# Patient Record
Sex: MALE | Race: WHITE | NOT HISPANIC OR LATINO | Employment: FULL TIME | RURAL
[De-identification: names, ages, dates, MRNs, and addresses within clinical notes are randomized per-mention and may not be internally consistent; named-entity substitution may affect disease eponyms.]

---

## 2017-03-16 ENCOUNTER — TELEPHONE (OUTPATIENT)
Dept: FAMILY MEDICINE CLINIC | Facility: CLINIC | Age: 52
End: 2017-03-16

## 2017-03-16 RX ORDER — OSELTAMIVIR PHOSPHATE 75 MG/1
75 CAPSULE ORAL 2 TIMES DAILY
Qty: 10 CAPSULE | Refills: 0 | Status: SHIPPED | OUTPATIENT
Start: 2017-03-16 | End: 2017-03-21

## 2017-03-16 NOTE — TELEPHONE ENCOUNTER
Pt wife called he has body aches chills coughing congestion and temp she wants to know if uwill call in meds md2 nka [phone]

## 2017-03-31 ENCOUNTER — OFFICE VISIT (OUTPATIENT)
Dept: FAMILY MEDICINE CLINIC | Facility: CLINIC | Age: 52
End: 2017-03-31

## 2017-03-31 VITALS
BODY MASS INDEX: 28.93 KG/M2 | DIASTOLIC BLOOD PRESSURE: 100 MMHG | SYSTOLIC BLOOD PRESSURE: 148 MMHG | OXYGEN SATURATION: 97 % | HEART RATE: 92 BPM | RESPIRATION RATE: 16 BRPM | HEIGHT: 78 IN | WEIGHT: 250 LBS

## 2017-03-31 DIAGNOSIS — R05.9 COUGH: ICD-10-CM

## 2017-03-31 DIAGNOSIS — I10 ESSENTIAL HYPERTENSION: Primary | ICD-10-CM

## 2017-03-31 PROCEDURE — 99213 OFFICE O/P EST LOW 20 MIN: CPT | Performed by: FAMILY MEDICINE

## 2017-03-31 RX ORDER — MONTELUKAST SODIUM 10 MG/1
10 TABLET ORAL NIGHTLY
Qty: 30 TABLET | Refills: 2 | Status: SHIPPED | OUTPATIENT
Start: 2017-03-31 | End: 2019-02-12

## 2017-04-07 ENCOUNTER — TELEPHONE (OUTPATIENT)
Dept: FAMILY MEDICINE CLINIC | Facility: CLINIC | Age: 52
End: 2017-04-07

## 2017-04-10 ENCOUNTER — TELEPHONE (OUTPATIENT)
Dept: FAMILY MEDICINE CLINIC | Facility: CLINIC | Age: 52
End: 2017-04-10

## 2017-04-10 RX ORDER — AMOXICILLIN AND CLAVULANATE POTASSIUM 875; 125 MG/1; MG/1
1 TABLET, FILM COATED ORAL 2 TIMES DAILY
Qty: 28 TABLET | Refills: 0 | Status: SHIPPED | OUTPATIENT
Start: 2017-04-10 | End: 2017-04-24

## 2017-04-10 NOTE — TELEPHONE ENCOUNTER
No, he had that fever and it subsided, but came back last night.  He has had the congestion and runny nose for 4 weeks.

## 2017-04-10 NOTE — TELEPHONE ENCOUNTER
Done & informed morgan that if natalee was not better by Wednesday that dr. pierce wanted to see him.

## 2017-04-10 NOTE — TELEPHONE ENCOUNTER
Jennie called and siad that Ty has a fever, chest congestion and a runny nose.    She said that he has been batteling this for 4 weeks.  Do you want to see him or send something in?   [phone]

## 2017-04-12 ENCOUNTER — OFFICE VISIT (OUTPATIENT)
Dept: FAMILY MEDICINE CLINIC | Facility: CLINIC | Age: 52
End: 2017-04-12

## 2017-04-12 ENCOUNTER — TELEPHONE (OUTPATIENT)
Dept: FAMILY MEDICINE CLINIC | Facility: CLINIC | Age: 52
End: 2017-04-12

## 2017-04-12 VITALS
DIASTOLIC BLOOD PRESSURE: 88 MMHG | TEMPERATURE: 99.4 F | HEIGHT: 78 IN | SYSTOLIC BLOOD PRESSURE: 110 MMHG | RESPIRATION RATE: 16 BRPM | HEART RATE: 88 BPM | BODY MASS INDEX: 28.93 KG/M2 | OXYGEN SATURATION: 97 % | WEIGHT: 250 LBS

## 2017-04-12 DIAGNOSIS — J10.1 INFLUENZA B: Primary | ICD-10-CM

## 2017-04-12 DIAGNOSIS — R05.9 COUGHING: Primary | ICD-10-CM

## 2017-04-12 PROCEDURE — 99213 OFFICE O/P EST LOW 20 MIN: CPT | Performed by: FAMILY MEDICINE

## 2017-04-12 RX ORDER — CODEINE PHOSPHATE AND GUAIFENESIN 10; 100 MG/5ML; MG/5ML
LIQUID ORAL
COMMUNITY
Start: 2017-04-07 | End: 2017-04-12

## 2017-04-12 RX ORDER — OSELTAMIVIR PHOSPHATE 75 MG/1
75 CAPSULE ORAL 2 TIMES DAILY
Qty: 10 CAPSULE | Refills: 0 | Status: SHIPPED | OUTPATIENT
Start: 2017-04-12 | End: 2017-06-30

## 2017-04-12 NOTE — TELEPHONE ENCOUNTER
Pt wife called she siad that he woke up this am with fever again and non stop coughing with runny nose with congestion was placed on meds Monday and told if not better by today to get an appt?? [phone]

## 2017-04-12 NOTE — PROGRESS NOTES
"Subjective   Natalee Webb is a 51 y.o. male.     Chief Complaint   Patient presents with   • Cough     dry cough, fever        History of Present Illness     natalee notes having issues with cough with 102 fever , body aches..noted runny nose       Current Outpatient Prescriptions:   •  amoxicillin-clavulanate (AUGMENTIN) 875-125 MG per tablet, Take 1 tablet by mouth 2 (Two) Times a Day for 14 days., Disp: 28 tablet, Rfl: 0  •  BYSTOLIC 5 MG tablet, , Disp: , Rfl:   •  esomeprazole (NexIUM) 40 MG capsule, , Disp: , Rfl:   •  Guaifenesin-Codeine 200-10 MG/5ML liquid, Take 2 tsps every 6 hrs PRN  For cough, Disp: 150 mL, Rfl: 0  •  montelukast (SINGULAIR) 10 MG tablet, Take 1 tablet by mouth Every Night., Disp: 30 tablet, Rfl: 2  No Known Allergies    Past Medical History:   Diagnosis Date   • GERD (gastroesophageal reflux disease)    • Hypertension      Past Surgical History:   Procedure Laterality Date   • HERNIA REPAIR         Review of Systems   Constitutional: Negative.    HENT: Positive for congestion and rhinorrhea.    Eyes: Negative.    Respiratory: Positive for cough.    Cardiovascular: Negative.    Gastrointestinal: Negative.    Endocrine: Negative.    Genitourinary: Negative.    Musculoskeletal: Negative.    Skin: Negative.    Allergic/Immunologic: Negative.    Neurological: Negative.    Hematological: Negative.    Psychiatric/Behavioral: Negative.        Objective  /88  Pulse 88  Temp 99.4 °F (37.4 °C)  Resp 16  Ht 79\" (200.7 cm)  Wt 250 lb (113 kg)  SpO2 97%  BMI 28.16 kg/m2  Physical Exam   Constitutional: He is oriented to person, place, and time. He appears well-developed and well-nourished.   HENT:   Head: Normocephalic and atraumatic.   Right Ear: External ear normal.   Left Ear: External ear normal.   Nose: Nose normal.   Mouth/Throat: Oropharynx is clear and moist.   Eyes: Conjunctivae and EOM are normal. Pupils are equal, round, and reactive to light.   Neck: Normal range of motion. Neck " supple.   Cardiovascular: Normal rate, regular rhythm, normal heart sounds and intact distal pulses.    Pulmonary/Chest: Effort normal and breath sounds normal.   Abdominal: Soft. Bowel sounds are normal.   Musculoskeletal: Normal range of motion.   Neurological: He is alert and oriented to person, place, and time. He has normal reflexes.   Skin: Skin is warm and dry.   Psychiatric: He has a normal mood and affect. His behavior is normal. Judgment and thought content normal.   Nursing note and vitals reviewed.  flu swab pos for flu b    Assessment/Plan   Ty was seen today for cough.    Diagnoses and all orders for this visit:    Influenza B    Other orders  -     oseltamivir (TAMIFLU) 75 MG capsule; Take 1 capsule by mouth 2 (Two) Times a Day.  -     HYDROcod Polst-CPM Polst ER (TUSSIONEX PENNKINETIC ER) 10-8 MG/5ML ER suspension; Take 5 mL by mouth Every 12 (Twelve) Hours As Needed for Cough.        Keep me informed         No orders of the defined types were placed in this encounter.      Follow up:ismael

## 2017-06-15 RX ORDER — NEBIVOLOL HYDROCHLORIDE 5 MG/1
5 TABLET ORAL 2 TIMES DAILY
Qty: 180 TABLET | Refills: 1 | Status: SHIPPED | OUTPATIENT
Start: 2017-06-15 | End: 2017-12-19 | Stop reason: SDUPTHER

## 2017-06-30 ENCOUNTER — OFFICE VISIT (OUTPATIENT)
Dept: FAMILY MEDICINE CLINIC | Facility: CLINIC | Age: 52
End: 2017-06-30

## 2017-06-30 VITALS
WEIGHT: 249 LBS | DIASTOLIC BLOOD PRESSURE: 80 MMHG | BODY MASS INDEX: 28.81 KG/M2 | HEIGHT: 78 IN | HEART RATE: 69 BPM | RESPIRATION RATE: 18 BRPM | SYSTOLIC BLOOD PRESSURE: 122 MMHG | OXYGEN SATURATION: 99 %

## 2017-06-30 DIAGNOSIS — I10 ESSENTIAL HYPERTENSION: Primary | ICD-10-CM

## 2017-06-30 DIAGNOSIS — K21.9 GASTROESOPHAGEAL REFLUX DISEASE WITHOUT ESOPHAGITIS: ICD-10-CM

## 2017-06-30 DIAGNOSIS — R10.2 PERINEAL PAIN: ICD-10-CM

## 2017-06-30 PROCEDURE — 99213 OFFICE O/P EST LOW 20 MIN: CPT | Performed by: FAMILY MEDICINE

## 2017-06-30 RX ORDER — CIPROFLOXACIN 500 MG/1
500 TABLET, FILM COATED ORAL 2 TIMES DAILY
Qty: 20 TABLET | Refills: 0 | Status: SHIPPED | OUTPATIENT
Start: 2017-06-30 | End: 2017-08-10

## 2017-06-30 NOTE — PROGRESS NOTES
"Subjective   Natalee Webb is a 51 y.o. male.     Chief Complaint   Patient presents with   • Follow-up     3 mo        History of Present Illness     natalee notes bp has been controlled witout ha or cp...his gerd symptoms witout dysphagia  nots pain in the perineal area wituout mass or tenderness    Current Outpatient Prescriptions:   •  BYSTOLIC 5 MG tablet, Take 1 tablet by mouth 2 (Two) Times a Day., Disp: 180 tablet, Rfl: 1  •  esomeprazole (NexIUM) 40 MG capsule, , Disp: , Rfl:   •  montelukast (SINGULAIR) 10 MG tablet, Take 1 tablet by mouth Every Night., Disp: 30 tablet, Rfl: 2  No Known Allergies    Past Medical History:   Diagnosis Date   • GERD (gastroesophageal reflux disease)    • Hypertension      Past Surgical History:   Procedure Laterality Date   • HERNIA REPAIR         Review of Systems   Constitutional: Negative.    HENT: Negative.    Eyes: Negative.    Respiratory: Negative.    Cardiovascular: Negative.    Gastrointestinal: Negative.    Endocrine: Negative.    Genitourinary: Negative for decreased urine volume, difficulty urinating, discharge, dysuria, flank pain, frequency, genital sores, hematuria, penile pain, penile swelling, scrotal swelling, testicular pain and urgency.   Musculoskeletal: Negative.    Skin: Negative.    Allergic/Immunologic: Negative.    Neurological: Negative.    Hematological: Negative.    Psychiatric/Behavioral: Negative.        Objective  /80  Pulse 69  Resp 18  Ht 79\" (200.7 cm)  Wt 249 lb (113 kg)  SpO2 99%  BMI 28.05 kg/m2  Physical Exam   Constitutional: He is oriented to person, place, and time. He appears well-developed and well-nourished.   HENT:   Head: Normocephalic and atraumatic.   Right Ear: External ear normal.   Left Ear: External ear normal.   Nose: Nose normal.   Mouth/Throat: Oropharynx is clear and moist.   Eyes: Conjunctivae and EOM are normal. Pupils are equal, round, and reactive to light.   Neck: Normal range of motion. Neck supple. "   Cardiovascular: Normal rate, regular rhythm, normal heart sounds and intact distal pulses.    Pulmonary/Chest: Effort normal and breath sounds normal.   Abdominal: Soft. Bowel sounds are normal. There is tenderness (perineal pain withotu mass).   Musculoskeletal: Normal range of motion.   Neurological: He is alert and oriented to person, place, and time. He has normal reflexes.   Skin: Skin is warm and dry.   Psychiatric: He has a normal mood and affect. His behavior is normal. Judgment and thought content normal.   Nursing note and vitals reviewed.      Assessment/Plan   Ty was seen today for follow-up.    Diagnoses and all orders for this visit:    Essential hypertension  -     CBC w AUTO Differential  -     Comprehensive Metabolic Panel  -     Lipid panel    Gastroesophageal reflux disease without esophagitis    Perineal pain  -     PSA  -     Urinalysis                 Orders Placed This Encounter   Procedures   • Comprehensive Metabolic Panel   • Lipid panel   • PSA   • Urinalysis   • CBC w AUTO Differential     Order Specific Question:   Manual Differential     Answer:   No       Follow up: 4 week(s)

## 2017-07-03 LAB
ALBUMIN SERPL-MCNC: 4.4 G/DL (ref 3.5–5)
ALBUMIN/GLOB SERPL: 1.6 G/DL (ref 1.1–2.5)
ALP SERPL-CCNC: 64 U/L (ref 24–120)
ALT SERPL-CCNC: 60 U/L (ref 0–54)
APPEARANCE UR: CLEAR
AST SERPL-CCNC: 36 U/L (ref 7–45)
BASOPHILS # BLD AUTO: 0.02 10*3/MM3 (ref 0–0.2)
BASOPHILS NFR BLD AUTO: 0.3 % (ref 0–2)
BILIRUB SERPL-MCNC: 0.8 MG/DL (ref 0.1–1)
BILIRUB UR QL STRIP: NEGATIVE
BUN SERPL-MCNC: 14 MG/DL (ref 5–21)
BUN/CREAT SERPL: 11.7 (ref 7–25)
CALCIUM SERPL-MCNC: 9.3 MG/DL (ref 8.4–10.4)
CHLORIDE SERPL-SCNC: 101 MMOL/L (ref 98–110)
CHOLEST SERPL-MCNC: 209 MG/DL (ref 130–200)
CO2 SERPL-SCNC: 28 MMOL/L (ref 24–31)
COLOR UR: ABNORMAL
CREAT SERPL-MCNC: 1.2 MG/DL (ref 0.5–1.4)
EOSINOPHIL # BLD AUTO: 0.09 10*3/MM3 (ref 0–0.7)
EOSINOPHIL NFR BLD AUTO: 1.5 % (ref 0–4)
ERYTHROCYTE [DISTWIDTH] IN BLOOD BY AUTOMATED COUNT: 13.6 % (ref 12–15)
GLOBULIN SER CALC-MCNC: 2.8 GM/DL
GLUCOSE SERPL-MCNC: 119 MG/DL (ref 70–100)
GLUCOSE UR QL: NEGATIVE
HCT VFR BLD AUTO: 48.5 % (ref 40–52)
HDLC SERPL-MCNC: 25 MG/DL
HGB BLD-MCNC: 16.3 G/DL (ref 14–18)
HGB UR QL STRIP: NEGATIVE
IMM GRANULOCYTES # BLD: 0.01 10*3/MM3 (ref 0–0.03)
IMM GRANULOCYTES NFR BLD: 0.2 % (ref 0–5)
KETONES UR QL STRIP: ABNORMAL
LDLC SERPL CALC-MCNC: ABNORMAL MG/DL
LEUKOCYTE ESTERASE UR QL STRIP: NEGATIVE
LYMPHOCYTES # BLD AUTO: 2.64 10*3/MM3 (ref 0.72–4.86)
LYMPHOCYTES NFR BLD AUTO: 43.6 % (ref 15–45)
MCH RBC QN AUTO: 29.5 PG (ref 28–32)
MCHC RBC AUTO-ENTMCNC: 33.6 G/DL (ref 33–36)
MCV RBC AUTO: 87.9 FL (ref 82–95)
MONOCYTES # BLD AUTO: 0.42 10*3/MM3 (ref 0.19–1.3)
MONOCYTES NFR BLD AUTO: 6.9 % (ref 4–12)
NEUTROPHILS # BLD AUTO: 2.87 10*3/MM3 (ref 1.87–8.4)
NEUTROPHILS NFR BLD AUTO: 47.5 % (ref 39–78)
NITRITE UR QL STRIP: NEGATIVE
PH UR STRIP: 5.5 [PH] (ref 5–8)
PLATELET # BLD AUTO: 138 10*3/MM3 (ref 130–400)
POTASSIUM SERPL-SCNC: 4.2 MMOL/L (ref 3.5–5.3)
PROT SERPL-MCNC: 7.2 G/DL (ref 6.3–8.7)
PROT UR QL STRIP: ABNORMAL
PSA SERPL-MCNC: 0.73 NG/ML (ref 0–4)
RBC # BLD AUTO: 5.52 10*6/MM3 (ref 4.8–5.9)
SODIUM SERPL-SCNC: 141 MMOL/L (ref 135–145)
SP GR UR: ABNORMAL (ref 1–1.03)
TRIGL SERPL-MCNC: 554 MG/DL (ref 0–149)
UROBILINOGEN UR STRIP-MCNC: ABNORMAL MG/DL
VLDLC SERPL CALC-MCNC: ABNORMAL MG/DL
WBC # BLD AUTO: 6.05 10*3/MM3 (ref 4.8–10.8)

## 2017-07-06 ENCOUNTER — TELEPHONE (OUTPATIENT)
Dept: FAMILY MEDICINE CLINIC | Facility: CLINIC | Age: 52
End: 2017-07-06

## 2017-07-06 RX ORDER — ATORVASTATIN CALCIUM 10 MG/1
10 TABLET, FILM COATED ORAL DAILY
Qty: 30 TABLET | Refills: 0 | Status: SHIPPED | OUTPATIENT
Start: 2017-07-06 | End: 2017-08-04 | Stop reason: SDUPTHER

## 2017-07-06 NOTE — TELEPHONE ENCOUNTER
Pt wife called and said that he is ok with taking something for his trig md2 he has an appt with you the end of the month.

## 2017-08-04 RX ORDER — ATORVASTATIN CALCIUM 10 MG/1
TABLET, FILM COATED ORAL
Qty: 30 TABLET | Refills: 5 | Status: SHIPPED | OUTPATIENT
Start: 2017-08-04 | End: 2017-08-10 | Stop reason: SDUPTHER

## 2017-08-10 ENCOUNTER — OFFICE VISIT (OUTPATIENT)
Dept: FAMILY MEDICINE CLINIC | Facility: CLINIC | Age: 52
End: 2017-08-10

## 2017-08-10 VITALS
HEART RATE: 78 BPM | OXYGEN SATURATION: 97 % | SYSTOLIC BLOOD PRESSURE: 138 MMHG | DIASTOLIC BLOOD PRESSURE: 82 MMHG | BODY MASS INDEX: 28.93 KG/M2 | HEIGHT: 78 IN | WEIGHT: 250 LBS | RESPIRATION RATE: 16 BRPM

## 2017-08-10 DIAGNOSIS — E78.2 MIXED HYPERLIPIDEMIA: Primary | ICD-10-CM

## 2017-08-10 LAB
ALBUMIN SERPL-MCNC: 4.5 G/DL (ref 3.5–5)
ALBUMIN/GLOB SERPL: 1.9 G/DL (ref 1.1–2.5)
ALP SERPL-CCNC: 58 U/L (ref 24–120)
ALT SERPL-CCNC: 54 U/L (ref 0–54)
AST SERPL-CCNC: 27 U/L (ref 7–45)
BILIRUB SERPL-MCNC: 0.6 MG/DL (ref 0.1–1)
BUN SERPL-MCNC: 14 MG/DL (ref 5–21)
BUN/CREAT SERPL: 12.5 (ref 7–25)
CALCIUM SERPL-MCNC: 9.2 MG/DL (ref 8.4–10.4)
CHLORIDE SERPL-SCNC: 103 MMOL/L (ref 98–110)
CHOLEST SERPL-MCNC: 164 MG/DL (ref 130–200)
CO2 SERPL-SCNC: 29 MMOL/L (ref 24–31)
CREAT SERPL-MCNC: 1.12 MG/DL (ref 0.5–1.4)
GLOBULIN SER CALC-MCNC: 2.4 GM/DL
GLUCOSE SERPL-MCNC: 149 MG/DL (ref 70–100)
HDLC SERPL-MCNC: 29 MG/DL
LDLC SERPL CALC-MCNC: 64 MG/DL (ref 0–99)
POTASSIUM SERPL-SCNC: 4 MMOL/L (ref 3.5–5.3)
PROT SERPL-MCNC: 6.9 G/DL (ref 6.3–8.7)
SODIUM SERPL-SCNC: 141 MMOL/L (ref 135–145)
TRIGL SERPL-MCNC: 353 MG/DL (ref 0–149)
VLDLC SERPL CALC-MCNC: 70.6 MG/DL

## 2017-08-10 PROCEDURE — 99213 OFFICE O/P EST LOW 20 MIN: CPT | Performed by: FAMILY MEDICINE

## 2017-08-10 RX ORDER — ATORVASTATIN CALCIUM 10 MG/1
10 TABLET, FILM COATED ORAL DAILY
Qty: 90 TABLET | Refills: 3 | Status: SHIPPED | OUTPATIENT
Start: 2017-08-10 | End: 2018-09-04 | Stop reason: SDUPTHER

## 2017-08-10 NOTE — PROGRESS NOTES
"Subjective   Natalee Webb is a 51 y.o. male.     Chief Complaint   Patient presents with   • Follow-up     4 wk       History of Present Illness     natalee is her for follow u[--his recent perineum pain has resolved--we were suspicious of underlying prostatis--denies fever, chills or trouble voiding--toelraging lipitor nicely wituout myalgias      Current Outpatient Prescriptions:   •  atorvastatin (LIPITOR) 10 MG tablet, Take 1 tablet by mouth Daily., Disp: 90 tablet, Rfl: 3  •  BYSTOLIC 5 MG tablet, Take 1 tablet by mouth 2 (Two) Times a Day., Disp: 180 tablet, Rfl: 1  •  esomeprazole (NexIUM) 40 MG capsule, , Disp: , Rfl:   •  montelukast (SINGULAIR) 10 MG tablet, Take 1 tablet by mouth Every Night., Disp: 30 tablet, Rfl: 2  No Known Allergies    Past Medical History:   Diagnosis Date   • GERD (gastroesophageal reflux disease)    • Hypertension      Past Surgical History:   Procedure Laterality Date   • HERNIA REPAIR         Review of Systems   Constitutional: Negative.    HENT: Negative.    Eyes: Negative.    Respiratory: Negative.    Cardiovascular: Negative.    Gastrointestinal: Negative.    Endocrine: Negative.    Genitourinary: Negative.    Musculoskeletal: Negative.    Skin: Negative.    Allergic/Immunologic: Negative.    Neurological: Negative.    Hematological: Negative.    Psychiatric/Behavioral: Negative.        Objective  /82  Pulse 78  Resp 16  Ht 79\" (200.7 cm)  Wt 250 lb (113 kg)  SpO2 97%  BMI 28.16 kg/m2  Physical Exam   Constitutional: He is oriented to person, place, and time. He appears well-developed and well-nourished.   HENT:   Head: Normocephalic.   Right Ear: External ear normal.   Left Ear: External ear normal.   Nose: Nose normal.   Mouth/Throat: Oropharynx is clear and moist.   Eyes: Conjunctivae and EOM are normal. Pupils are equal, round, and reactive to light.   Neck: Normal range of motion. Neck supple.   Cardiovascular: Normal rate, regular rhythm, normal heart sounds and " intact distal pulses.    Pulmonary/Chest: Effort normal and breath sounds normal.   Abdominal: Soft. Bowel sounds are normal.   Musculoskeletal: Normal range of motion.   Neurological: He is alert and oriented to person, place, and time. He has normal reflexes.   Skin: Skin is warm and dry.   Psychiatric: He has a normal mood and affect. His behavior is normal. Judgment and thought content normal.   Nursing note and vitals reviewed.      Assessment/Plan   Ty was seen today for follow-up.    Diagnoses and all orders for this visit:    Mixed hyperlipidemia  -     Comprehensive Metabolic Panel  -     Lipid panel                 Orders Placed This Encounter   Procedures   • Comprehensive Metabolic Panel   • Lipid panel       Follow up: 6 month(s)

## 2017-08-31 RX ORDER — ESOMEPRAZOLE MAGNESIUM 40 MG/1
CAPSULE, DELAYED RELEASE ORAL
Qty: 90 CAPSULE | Refills: 4 | Status: SHIPPED | OUTPATIENT
Start: 2017-08-31 | End: 2018-11-26 | Stop reason: SDUPTHER

## 2017-12-20 RX ORDER — NEBIVOLOL HYDROCHLORIDE 5 MG/1
TABLET ORAL
Qty: 180 TABLET | Refills: 1 | Status: SHIPPED | OUTPATIENT
Start: 2017-12-20 | End: 2018-06-18 | Stop reason: SDUPTHER

## 2018-01-04 ENCOUNTER — TELEPHONE (OUTPATIENT)
Dept: FAMILY MEDICINE CLINIC | Facility: CLINIC | Age: 53
End: 2018-01-04

## 2018-01-04 NOTE — TELEPHONE ENCOUNTER
Pt wife called he has a bad cough he wants to know if uwill call in cough syrup if not better he will let us know md2  [phone]

## 2018-01-10 ENCOUNTER — TELEPHONE (OUTPATIENT)
Dept: FAMILY MEDICINE CLINIC | Facility: CLINIC | Age: 53
End: 2018-01-10

## 2018-01-10 NOTE — TELEPHONE ENCOUNTER
Pt called w/ cough and req rx for cough syrup.  rx written for robitussin AC.  [place] [employer] report ran and scanned into chart.

## 2018-06-18 RX ORDER — NEBIVOLOL HYDROCHLORIDE 5 MG/1
5 TABLET ORAL 2 TIMES DAILY
Qty: 180 TABLET | Refills: 1 | Status: SHIPPED | OUTPATIENT
Start: 2018-06-18 | End: 2019-01-31 | Stop reason: SDUPTHER

## 2018-09-04 RX ORDER — ATORVASTATIN CALCIUM 10 MG/1
10 TABLET, FILM COATED ORAL DAILY
Qty: 90 TABLET | Refills: 3 | Status: SHIPPED | OUTPATIENT
Start: 2018-09-04 | End: 2019-01-31 | Stop reason: SDUPTHER

## 2018-11-26 RX ORDER — ESOMEPRAZOLE MAGNESIUM 40 MG/1
40 CAPSULE, DELAYED RELEASE ORAL DAILY
Qty: 90 CAPSULE | Refills: 0 | Status: SHIPPED | OUTPATIENT
Start: 2018-11-26 | End: 2019-01-31 | Stop reason: SDUPTHER

## 2018-12-11 NOTE — PROGRESS NOTES
"Subjective   Ty Webb is a 51 y.o. male.     Chief Complaint   Patient presents with   • Sinus Problem   CC:im here about my bp    History of Present Illness     still noting his bp to be up just a bit=---denies any cp or ha  Has dry cough since recent uri    Current Outpatient Prescriptions:   •  BYSTOLIC 5 MG tablet, , Disp: , Rfl:   •  esomeprazole (NexIUM) 40 MG capsule, , Disp: , Rfl:   No Known Allergies    Past Medical History:   Diagnosis Date   • GERD (gastroesophageal reflux disease)    • Hypertension      Past Surgical History:   Procedure Laterality Date   • HERNIA REPAIR         Review of Systems   Constitutional: Negative.    HENT: Negative.    Eyes: Negative.    Respiratory: Positive for cough.    Cardiovascular: Negative.    Gastrointestinal: Negative.    Endocrine: Negative.    Genitourinary: Negative.    Musculoskeletal: Negative.    Skin: Negative.    Allergic/Immunologic: Negative.    Neurological: Negative.    Hematological: Negative.    Psychiatric/Behavioral: Negative.        Objective  /100  Pulse 92  Resp 16  Ht 79\" (200.7 cm)  Wt 250 lb (113 kg)  SpO2 97%  BMI 28.16 kg/m2  Physical Exam   Constitutional: He is oriented to person, place, and time. He appears well-developed and well-nourished.   HENT:   Head: Normocephalic and atraumatic.   Right Ear: External ear normal.   Left Ear: External ear normal.   Nose: Nose normal.   Mouth/Throat: Oropharynx is clear and moist.   Eyes: Conjunctivae and EOM are normal. Pupils are equal, round, and reactive to light.   Neck: Normal range of motion. Neck supple.   Cardiovascular: Normal rate, regular rhythm, normal heart sounds and intact distal pulses.    Pulmonary/Chest: Effort normal and breath sounds normal.   Abdominal: Soft. Bowel sounds are normal.   Musculoskeletal: Normal range of motion.   Neurological: He is alert and oriented to person, place, and time. He has normal reflexes.   Skin: Skin is warm and dry.   Psychiatric: He " Home has a normal mood and affect. His behavior is normal. Judgment and thought content normal.   Nursing note and vitals reviewed.      Assessment/Plan   Ty was seen today for sinus problem.    Diagnoses and all orders for this visit:    Essential hypertension    Cough    increase bystolic 10mg---  symbicort 80 2 puffs bid     Monitor bp and keep me informd       No orders of the defined types were placed in this encounter.      Follow up: 3 month(s)

## 2019-01-31 RX ORDER — NEBIVOLOL HYDROCHLORIDE 5 MG/1
5 TABLET ORAL 2 TIMES DAILY
Qty: 180 TABLET | Refills: 0 | Status: SHIPPED | OUTPATIENT
Start: 2019-01-31 | End: 2019-02-12 | Stop reason: ALTCHOICE

## 2019-01-31 RX ORDER — ESOMEPRAZOLE MAGNESIUM 40 MG/1
40 CAPSULE, DELAYED RELEASE ORAL DAILY
Qty: 90 CAPSULE | Refills: 0 | Status: SHIPPED | OUTPATIENT
Start: 2019-01-31 | End: 2019-03-15 | Stop reason: SDUPTHER

## 2019-01-31 RX ORDER — ATORVASTATIN CALCIUM 10 MG/1
10 TABLET, FILM COATED ORAL DAILY
Qty: 90 TABLET | Refills: 0 | Status: SHIPPED | OUTPATIENT
Start: 2019-01-31 | End: 2019-03-15 | Stop reason: SDUPTHER

## 2019-02-04 ENCOUNTER — TELEPHONE (OUTPATIENT)
Dept: FAMILY MEDICINE CLINIC | Facility: CLINIC | Age: 54
End: 2019-02-04

## 2019-02-12 ENCOUNTER — OFFICE VISIT (OUTPATIENT)
Dept: FAMILY MEDICINE CLINIC | Facility: CLINIC | Age: 54
End: 2019-02-12

## 2019-02-12 VITALS
DIASTOLIC BLOOD PRESSURE: 96 MMHG | HEIGHT: 78 IN | BODY MASS INDEX: 29.39 KG/M2 | WEIGHT: 254 LBS | TEMPERATURE: 97.5 F | SYSTOLIC BLOOD PRESSURE: 124 MMHG | OXYGEN SATURATION: 95 % | RESPIRATION RATE: 18 BRPM | HEART RATE: 76 BPM

## 2019-02-12 DIAGNOSIS — E78.2 MIXED HYPERLIPIDEMIA: ICD-10-CM

## 2019-02-12 DIAGNOSIS — K21.9 GASTROESOPHAGEAL REFLUX DISEASE WITHOUT ESOPHAGITIS: ICD-10-CM

## 2019-02-12 DIAGNOSIS — I10 ESSENTIAL HYPERTENSION: Primary | ICD-10-CM

## 2019-02-12 PROCEDURE — 99213 OFFICE O/P EST LOW 20 MIN: CPT | Performed by: FAMILY MEDICINE

## 2019-02-12 RX ORDER — CARVEDILOL 25 MG/1
25 TABLET ORAL 2 TIMES DAILY WITH MEALS
Qty: 60 TABLET | Refills: 5 | Status: SHIPPED | OUTPATIENT
Start: 2019-02-12 | End: 2019-03-15 | Stop reason: SDUPTHER

## 2019-02-12 NOTE — PROGRESS NOTES
"Subjective   Ty Webb is a 53 y.o. male.     Chief Complaint   Patient presents with   • Hyperlipidemia     6 month follow-up.   • Hypertension        History of Present Illness     he is toleraing lipitor without myalgias ---his gerd symptoms are stable --he notes his bp has been up at times      Current Outpatient Medications:   •  atorvastatin (LIPITOR) 10 MG tablet, Take 1 tablet by mouth Daily., Disp: 90 tablet, Rfl: 0  •  esomeprazole (nexIUM) 40 MG capsule, Take 1 capsule by mouth Daily., Disp: 90 capsule, Rfl: 0  •  carvedilol (COREG) 25 MG tablet, Take 1 tablet by mouth 2 (Two) Times a Day With Meals., Disp: 60 tablet, Rfl: 5  No Known Allergies    Past Medical History:   Diagnosis Date   • GERD (gastroesophageal reflux disease)    • Hyperlipidemia    • Hypertension      Past Surgical History:   Procedure Laterality Date   • HERNIA REPAIR         Review of Systems   Constitutional: Negative.    HENT: Negative.    Eyes: Negative.    Respiratory: Negative.    Cardiovascular: Negative.    Gastrointestinal: Negative.    Endocrine: Negative.    Genitourinary: Negative.    Musculoskeletal: Negative.    Skin: Negative.    Allergic/Immunologic: Negative.    Neurological: Negative.    Hematological: Negative.    Psychiatric/Behavioral: Negative.        Objective  /96 (BP Location: Left arm, Patient Position: Sitting, Cuff Size: Adult)   Pulse 76   Temp 97.5 °F (36.4 °C) (Oral)   Resp 18   Ht 200.7 cm (79\")   Wt 115 kg (254 lb)   SpO2 95%   BMI 28.61 kg/m²   Physical Exam   Constitutional: He is oriented to person, place, and time. He appears well-developed and well-nourished.   HENT:   Head: Normocephalic and atraumatic.   Right Ear: External ear normal.   Left Ear: External ear normal.   Nose: Nose normal.   Mouth/Throat: Oropharynx is clear and moist.   Eyes: Conjunctivae and EOM are normal. Pupils are equal, round, and reactive to light.   Neck: Normal range of motion. Neck supple. "   Cardiovascular: Normal rate, regular rhythm, normal heart sounds and intact distal pulses.   Pulmonary/Chest: Effort normal and breath sounds normal.   Abdominal: Soft. Bowel sounds are normal.   Musculoskeletal: Normal range of motion.   Neurological: He is alert and oriented to person, place, and time.   Skin: Skin is warm and dry. Capillary refill takes less than 2 seconds.   Psychiatric: He has a normal mood and affect. His behavior is normal. Thought content normal.   Nursing note and vitals reviewed.      Assessment/Plan   Ty was seen today for hyperlipidemia and hypertension.    Diagnoses and all orders for this visit:    Essential hypertension    Mixed hyperlipidemia    Gastroesophageal reflux disease without esophagitis    Other orders  -     carvedilol (COREG) 25 MG tablet; Take 1 tablet by mouth 2 (Two) Times a Day With Meals.      Monitor bp           No orders of the defined types were placed in this encounter.      Follow up: 6 month(s)

## 2019-02-13 LAB
ALBUMIN SERPL-MCNC: 4.5 G/DL (ref 3.5–5)
ALBUMIN/GLOB SERPL: 1.6 G/DL (ref 1.1–2.5)
ALP SERPL-CCNC: 62 U/L (ref 24–120)
ALT SERPL-CCNC: 57 U/L (ref 0–54)
APPEARANCE UR: CLEAR
AST SERPL-CCNC: 31 U/L (ref 7–45)
BASOPHILS # BLD AUTO: 0.04 10*3/MM3 (ref 0–0.2)
BASOPHILS NFR BLD AUTO: 0.5 % (ref 0–2)
BILIRUB SERPL-MCNC: 0.8 MG/DL (ref 0.1–1)
BILIRUB UR QL STRIP: NEGATIVE
BUN SERPL-MCNC: 13 MG/DL (ref 5–21)
BUN/CREAT SERPL: 11.9 (ref 7–25)
CALCIUM SERPL-MCNC: 9.5 MG/DL (ref 8.4–10.4)
CHLORIDE SERPL-SCNC: 99 MMOL/L (ref 98–110)
CHOLEST SERPL-MCNC: 161 MG/DL (ref 130–200)
CHOLEST/HDLC SERPL: 5.75 {RATIO}
CO2 SERPL-SCNC: 29 MMOL/L (ref 24–31)
COLOR UR: YELLOW
CREAT SERPL-MCNC: 1.09 MG/DL (ref 0.5–1.4)
EOSINOPHIL # BLD AUTO: 0.04 10*3/MM3 (ref 0–0.7)
EOSINOPHIL NFR BLD AUTO: 0.5 % (ref 0–4)
ERYTHROCYTE [DISTWIDTH] IN BLOOD BY AUTOMATED COUNT: 12.8 % (ref 12–15)
GLOBULIN SER CALC-MCNC: 2.9 GM/DL
GLUCOSE SERPL-MCNC: 98 MG/DL (ref 70–100)
GLUCOSE UR QL: NEGATIVE
HCT VFR BLD AUTO: 51.1 % (ref 40–52)
HDLC SERPL-MCNC: 28 MG/DL
HGB BLD-MCNC: 17.2 G/DL (ref 14–18)
HGB UR QL STRIP: NEGATIVE
IMM GRANULOCYTES # BLD AUTO: 0.02 10*3/MM3 (ref 0–0.05)
IMM GRANULOCYTES NFR BLD AUTO: 0.2 % (ref 0–5)
KETONES UR QL STRIP: NEGATIVE
LDLC SERPL CALC-MCNC: 91 MG/DL (ref 0–99)
LEUKOCYTE ESTERASE UR QL STRIP: NEGATIVE
LYMPHOCYTES # BLD AUTO: 2.6 10*3/MM3 (ref 0.72–4.86)
LYMPHOCYTES NFR BLD AUTO: 30.4 % (ref 15–45)
MCH RBC QN AUTO: 29.1 PG (ref 28–32)
MCHC RBC AUTO-ENTMCNC: 33.7 G/DL (ref 33–36)
MCV RBC AUTO: 86.3 FL (ref 82–95)
MONOCYTES # BLD AUTO: 0.54 10*3/MM3 (ref 0.19–1.3)
MONOCYTES NFR BLD AUTO: 6.3 % (ref 4–12)
NEUTROPHILS # BLD AUTO: 5.3 10*3/MM3 (ref 1.87–8.4)
NEUTROPHILS NFR BLD AUTO: 62.1 % (ref 39–78)
NITRITE UR QL STRIP: NEGATIVE
NRBC BLD AUTO-RTO: 0 /100 WBC (ref 0–0)
PH UR STRIP: 6.5 [PH] (ref 5–8)
PLATELET # BLD AUTO: 160 10*3/MM3 (ref 130–400)
POTASSIUM SERPL-SCNC: 4.3 MMOL/L (ref 3.5–5.3)
PROT SERPL-MCNC: 7.4 G/DL (ref 6.3–8.7)
PROT UR QL STRIP: NEGATIVE
PSA SERPL-MCNC: 0.79 NG/ML (ref 0–4)
RBC # BLD AUTO: 5.92 10*6/MM3 (ref 4.8–5.9)
SODIUM SERPL-SCNC: 137 MMOL/L (ref 135–145)
SP GR UR: 1.01 (ref 1–1.03)
TRIGL SERPL-MCNC: 212 MG/DL (ref 0–149)
UROBILINOGEN UR STRIP-MCNC: NORMAL MG/DL
VLDLC SERPL CALC-MCNC: 42.4 MG/DL
WBC # BLD AUTO: 8.54 10*3/MM3 (ref 4.8–10.8)

## 2019-03-15 RX ORDER — ATORVASTATIN CALCIUM 10 MG/1
10 TABLET, FILM COATED ORAL DAILY
Qty: 90 TABLET | Refills: 1 | Status: SHIPPED | OUTPATIENT
Start: 2019-03-15 | End: 2019-09-10 | Stop reason: SDUPTHER

## 2019-03-15 RX ORDER — ESOMEPRAZOLE MAGNESIUM 40 MG/1
40 CAPSULE, DELAYED RELEASE ORAL DAILY
Qty: 90 CAPSULE | Refills: 1 | Status: SHIPPED | OUTPATIENT
Start: 2019-03-15 | End: 2019-10-29 | Stop reason: SDUPTHER

## 2019-03-15 RX ORDER — CARVEDILOL 25 MG/1
25 TABLET ORAL 2 TIMES DAILY WITH MEALS
Qty: 60 TABLET | Refills: 0 | Status: SHIPPED | OUTPATIENT
Start: 2019-03-15 | End: 2019-03-15 | Stop reason: SDUPTHER

## 2019-03-15 RX ORDER — CARVEDILOL 25 MG/1
25 TABLET ORAL 2 TIMES DAILY WITH MEALS
Qty: 180 TABLET | Refills: 1 | Status: SHIPPED | OUTPATIENT
Start: 2019-03-15 | End: 2019-09-23 | Stop reason: SDUPTHER

## 2019-08-15 ENCOUNTER — OFFICE VISIT (OUTPATIENT)
Dept: FAMILY MEDICINE CLINIC | Facility: CLINIC | Age: 54
End: 2019-08-15

## 2019-08-15 VITALS
RESPIRATION RATE: 16 BRPM | OXYGEN SATURATION: 98 % | HEART RATE: 74 BPM | SYSTOLIC BLOOD PRESSURE: 140 MMHG | TEMPERATURE: 98.8 F | HEIGHT: 78 IN | BODY MASS INDEX: 29.27 KG/M2 | DIASTOLIC BLOOD PRESSURE: 88 MMHG | WEIGHT: 253 LBS

## 2019-08-15 DIAGNOSIS — I10 ESSENTIAL HYPERTENSION: Primary | ICD-10-CM

## 2019-08-15 DIAGNOSIS — K21.9 GASTROESOPHAGEAL REFLUX DISEASE WITHOUT ESOPHAGITIS: ICD-10-CM

## 2019-08-15 DIAGNOSIS — E78.2 MIXED HYPERLIPIDEMIA: ICD-10-CM

## 2019-08-15 PROCEDURE — 99214 OFFICE O/P EST MOD 30 MIN: CPT | Performed by: FAMILY MEDICINE

## 2019-08-15 NOTE — PROGRESS NOTES
"Subjective   Ty Webb is a 53 y.o. male.     Chief Complaint   Patient presents with   • Follow-up     6mo htn        History of Present Illness     he thinks his bp is doing well wituout cp or ha--he is tolerating statin without myalgias ..his gerd symptoms are controlled with meds      Current Outpatient Medications:   •  atorvastatin (LIPITOR) 10 MG tablet, Take 1 tablet by mouth Daily., Disp: 90 tablet, Rfl: 1  •  carvedilol (COREG) 25 MG tablet, Take 1 tablet by mouth 2 (Two) Times a Day With Meals., Disp: 180 tablet, Rfl: 1  •  esomeprazole (nexIUM) 40 MG capsule, Take 1 capsule by mouth Daily., Disp: 90 capsule, Rfl: 1  No Known Allergies    Past Medical History:   Diagnosis Date   • GERD (gastroesophageal reflux disease)    • Hyperlipidemia    • Hypertension      Past Surgical History:   Procedure Laterality Date   • HERNIA REPAIR         Review of Systems   Constitutional: Negative.    HENT: Negative.    Eyes: Negative.    Respiratory: Negative.    Cardiovascular: Negative.    Gastrointestinal: Negative.    Endocrine: Negative.    Genitourinary: Negative.    Musculoskeletal: Negative.    Skin: Negative.    Allergic/Immunologic: Negative.    Neurological: Negative.    Hematological: Negative.    Psychiatric/Behavioral: Negative.        Objective  /88   Pulse 74   Temp 98.8 °F (37.1 °C)   Resp 16   Ht 200.7 cm (79.02\")   Wt 115 kg (253 lb)   SpO2 98%   BMI 28.49 kg/m²   Physical Exam   Constitutional: He is oriented to person, place, and time. He appears well-developed and well-nourished.   HENT:   Head: Normocephalic and atraumatic.   Right Ear: External ear normal.   Left Ear: External ear normal.   Nose: Nose normal.   Mouth/Throat: Oropharynx is clear and moist.   Eyes: Conjunctivae and EOM are normal. Pupils are equal, round, and reactive to light.   Neck: Normal range of motion. Neck supple.   Cardiovascular: Normal rate, regular rhythm, normal heart sounds and intact distal pulses. "   Pulmonary/Chest: Effort normal and breath sounds normal.   Abdominal: Soft. Bowel sounds are normal.   Musculoskeletal: Normal range of motion.   Neurological: He is alert and oriented to person, place, and time.   Skin: Skin is warm. Capillary refill takes less than 2 seconds.   Psychiatric: He has a normal mood and affect. His behavior is normal. Judgment and thought content normal.   Nursing note and vitals reviewed.      Assessment/Plan      HYPERTENSION  Hyperlipidemia  GERD    he says his colon is up to date                   Follow up: 6 month(s)

## 2019-09-10 RX ORDER — ATORVASTATIN CALCIUM 10 MG/1
10 TABLET, FILM COATED ORAL DAILY
Qty: 90 TABLET | Refills: 1 | Status: SHIPPED | OUTPATIENT
Start: 2019-09-10 | End: 2020-03-24 | Stop reason: SDUPTHER

## 2019-09-23 RX ORDER — CARVEDILOL 25 MG/1
TABLET ORAL
Qty: 180 TABLET | Refills: 1 | Status: SHIPPED | OUTPATIENT
Start: 2019-09-23 | End: 2020-03-24 | Stop reason: SDUPTHER

## 2019-10-29 RX ORDER — ESOMEPRAZOLE MAGNESIUM 40 MG/1
CAPSULE, DELAYED RELEASE ORAL
Qty: 90 CAPSULE | Refills: 1 | Status: SHIPPED | OUTPATIENT
Start: 2019-10-29

## 2020-03-24 RX ORDER — CARVEDILOL 25 MG/1
25 TABLET ORAL 2 TIMES DAILY WITH MEALS
Qty: 180 TABLET | Refills: 1 | Status: SHIPPED | OUTPATIENT
Start: 2020-03-24 | End: 2020-10-12

## 2020-03-24 RX ORDER — ATORVASTATIN CALCIUM 10 MG/1
10 TABLET, FILM COATED ORAL DAILY
Qty: 90 TABLET | Refills: 1 | Status: SHIPPED | OUTPATIENT
Start: 2020-03-24 | End: 2020-10-12

## 2020-03-24 NOTE — TELEPHONE ENCOUNTER
Patients wife called in requesting a refill on the patients carvedilol (COREG) 25 MG tablet and atorvastatin (LIPITOR) 10 MG tablet medication. Please send refill to the [facility]/pharmacy #[id number] - [place] - [address] FROM [address]     For any questions or issues, please call patients wife back at [phone]

## 2020-10-06 ENCOUNTER — OFFICE VISIT (OUTPATIENT)
Dept: FAMILY MEDICINE CLINIC | Facility: CLINIC | Age: 55
End: 2020-10-06

## 2020-10-06 VITALS
WEIGHT: 264 LBS | OXYGEN SATURATION: 97 % | SYSTOLIC BLOOD PRESSURE: 138 MMHG | RESPIRATION RATE: 16 BRPM | HEIGHT: 78 IN | DIASTOLIC BLOOD PRESSURE: 80 MMHG | BODY MASS INDEX: 30.55 KG/M2 | HEART RATE: 76 BPM

## 2020-10-06 DIAGNOSIS — Z00.00 ANNUAL PHYSICAL EXAM: Primary | ICD-10-CM

## 2020-10-06 PROCEDURE — 99396 PREV VISIT EST AGE 40-64: CPT | Performed by: FAMILY MEDICINE

## 2020-10-06 NOTE — PROGRESS NOTES
"Subjective   Ty Webb is a 55 y.o. male.     Chief Complaint   Patient presents with   • Annual Exam     physical       History of Present Illness     he nots good bp control without cp or ha---he is toleragin lipitior witjhout myalgia s --his gerd sympotmos are stable without dysphaiga      Current Outpatient Medications:   •  atorvastatin (LIPITOR) 10 MG tablet, Take 1 tablet by mouth Daily., Disp: 90 tablet, Rfl: 1  •  carvedilol (COREG) 25 MG tablet, Take 1 tablet by mouth 2 (Two) Times a Day With Meals., Disp: 180 tablet, Rfl: 1  •  esomeprazole (nexIUM) 40 MG capsule, TAKE 1 CAPSULE DAILY       *PERRIGO*, Disp: 90 capsule, Rfl: 1  No Known Allergies    Past Medical History:   Diagnosis Date   • GERD (gastroesophageal reflux disease)    • Hyperlipidemia    • Hypertension      Past Surgical History:   Procedure Laterality Date   • HERNIA REPAIR         Review of Systems   Constitutional: Negative.    HENT: Negative.    Eyes: Negative.    Respiratory: Negative.    Cardiovascular: Negative.    Gastrointestinal: Negative.    Endocrine: Negative.    Genitourinary: Negative.    Musculoskeletal: Negative.    Skin: Negative.    Allergic/Immunologic: Negative.    Neurological: Negative.    Hematological: Negative.    Psychiatric/Behavioral: Negative.        Objective  /80   Pulse 76   Resp 16   Ht 200.7 cm (79\")   Wt 120 kg (264 lb)   SpO2 97%   BMI 29.74 kg/m²   Physical Exam  Vitals signs and nursing note reviewed.   Constitutional:       Appearance: Normal appearance. He is normal weight.   HENT:      Head: Normocephalic and atraumatic.      Right Ear: Tympanic membrane, ear canal and external ear normal.      Left Ear: Tympanic membrane, ear canal and external ear normal.      Nose: Nose normal.      Mouth/Throat:      Mouth: Mucous membranes are dry.      Pharynx: Oropharynx is clear.   Eyes:      Conjunctiva/sclera: Conjunctivae normal.      Pupils: Pupils are equal, round, and reactive to light. "   Neck:      Musculoskeletal: Normal range of motion.   Cardiovascular:      Rate and Rhythm: Normal rate.      Pulses: Normal pulses.      Heart sounds: Normal heart sounds.   Pulmonary:      Effort: Pulmonary effort is normal.   Abdominal:      General: Abdomen is flat. Bowel sounds are normal.      Palpations: Abdomen is soft.   Musculoskeletal: Normal range of motion.   Skin:     General: Skin is warm and dry.      Capillary Refill: Capillary refill takes less than 2 seconds.   Neurological:      General: No focal deficit present.      Mental Status: He is alert and oriented to person, place, and time. Mental status is at baseline.   Psychiatric:         Mood and Affect: Mood normal.         Behavior: Behavior normal.         Thought Content: Thought content normal.         Judgment: Judgment normal.         Assessment/Plan   Ty was seen today for annual exam.    Diagnoses and all orders for this visit:    Annual physical exam  -     CBC w AUTO Differential  -     Comprehensive metabolic panel  -     Lipid Panel With / Chol / HDL Ratio  -     Hepatitis C Antibody  -     PSA DIAGNOSTIC  -     Urinalysis without microscopic (no culture) - Urine, Clean Catch      We discussed cancer screening and covid 19 issues.           Orders Placed This Encounter   Procedures   • Comprehensive metabolic panel   • Lipid Panel With / Chol / HDL Ratio   • Hepatitis C Antibody   • PSA DIAGNOSTIC   • Urinalysis without microscopic (no culture) - Urine, Clean Catch   • CBC w AUTO Differential     Order Specific Question:   Manual Differential     Answer:   No       Follow up: 6 month(s)

## 2020-10-07 LAB
ALBUMIN SERPL-MCNC: 4.7 G/DL (ref 3.5–5.2)
ALBUMIN/GLOB SERPL: 2.2 G/DL
ALP SERPL-CCNC: 64 U/L (ref 39–117)
ALT SERPL-CCNC: 37 U/L (ref 1–41)
APPEARANCE UR: CLEAR
AST SERPL-CCNC: 25 U/L (ref 1–40)
BASOPHILS # BLD AUTO: 0.04 10*3/MM3 (ref 0–0.2)
BASOPHILS NFR BLD AUTO: 0.5 % (ref 0–1.5)
BILIRUB SERPL-MCNC: 0.5 MG/DL (ref 0–1.2)
BILIRUB UR QL STRIP: NEGATIVE
BUN SERPL-MCNC: 18 MG/DL (ref 6–20)
BUN/CREAT SERPL: 16.1 (ref 7–25)
CALCIUM SERPL-MCNC: 9.1 MG/DL (ref 8.6–10.5)
CHLORIDE SERPL-SCNC: 102 MMOL/L (ref 98–107)
CHOLEST SERPL-MCNC: 139 MG/DL (ref 0–200)
CHOLEST/HDLC SERPL: 4.48 {RATIO}
CO2 SERPL-SCNC: 26.4 MMOL/L (ref 22–29)
COLOR UR: YELLOW
CREAT SERPL-MCNC: 1.12 MG/DL (ref 0.76–1.27)
EOSINOPHIL # BLD AUTO: 0.1 10*3/MM3 (ref 0–0.4)
EOSINOPHIL NFR BLD AUTO: 1.3 % (ref 0.3–6.2)
ERYTHROCYTE [DISTWIDTH] IN BLOOD BY AUTOMATED COUNT: 13.2 % (ref 12.3–15.4)
GLOBULIN SER CALC-MCNC: 2.1 GM/DL
GLUCOSE SERPL-MCNC: 111 MG/DL (ref 65–99)
GLUCOSE UR QL: NEGATIVE
HCT VFR BLD AUTO: 47.5 % (ref 37.5–51)
HCV AB S/CO SERPL IA: 0.1 S/CO RATIO (ref 0–0.9)
HDLC SERPL-MCNC: 31 MG/DL (ref 40–60)
HGB BLD-MCNC: 16.6 G/DL (ref 13–17.7)
HGB UR QL STRIP: NEGATIVE
IMM GRANULOCYTES # BLD AUTO: 0.03 10*3/MM3 (ref 0–0.05)
IMM GRANULOCYTES NFR BLD AUTO: 0.4 % (ref 0–0.5)
KETONES UR QL STRIP: NEGATIVE
LDLC SERPL CALC-MCNC: 63 MG/DL (ref 0–100)
LEUKOCYTE ESTERASE UR QL STRIP: NEGATIVE
LYMPHOCYTES # BLD AUTO: 2.46 10*3/MM3 (ref 0.7–3.1)
LYMPHOCYTES NFR BLD AUTO: 32.6 % (ref 19.6–45.3)
MCH RBC QN AUTO: 30.1 PG (ref 26.6–33)
MCHC RBC AUTO-ENTMCNC: 34.9 G/DL (ref 31.5–35.7)
MCV RBC AUTO: 86.1 FL (ref 79–97)
MONOCYTES # BLD AUTO: 0.54 10*3/MM3 (ref 0.1–0.9)
MONOCYTES NFR BLD AUTO: 7.2 % (ref 5–12)
NEUTROPHILS # BLD AUTO: 4.38 10*3/MM3 (ref 1.7–7)
NEUTROPHILS NFR BLD AUTO: 58 % (ref 42.7–76)
NITRITE UR QL STRIP: NEGATIVE
NRBC BLD AUTO-RTO: 0 /100 WBC (ref 0–0.2)
PH UR STRIP: 6.5 [PH] (ref 5–8)
PLATELET # BLD AUTO: 115 10*3/MM3 (ref 140–450)
POTASSIUM SERPL-SCNC: 4.1 MMOL/L (ref 3.5–5.2)
PROT SERPL-MCNC: 6.8 G/DL (ref 6–8.5)
PROT UR QL STRIP: NEGATIVE
PSA SERPL-MCNC: 0.89 NG/ML (ref 0–4)
RBC # BLD AUTO: 5.52 10*6/MM3 (ref 4.14–5.8)
SODIUM SERPL-SCNC: 140 MMOL/L (ref 136–145)
SP GR UR: 1.02 (ref 1–1.03)
TRIGL SERPL-MCNC: 227 MG/DL (ref 0–150)
UROBILINOGEN UR STRIP-MCNC: NORMAL MG/DL
VLDLC SERPL CALC-MCNC: 45.4 MG/DL
WBC # BLD AUTO: 7.55 10*3/MM3 (ref 3.4–10.8)

## 2020-10-12 RX ORDER — CARVEDILOL 25 MG/1
TABLET ORAL
Qty: 180 TABLET | Refills: 1 | Status: SHIPPED | OUTPATIENT
Start: 2020-10-12 | End: 2021-04-30

## 2020-10-12 RX ORDER — ATORVASTATIN CALCIUM 10 MG/1
TABLET, FILM COATED ORAL
Qty: 90 TABLET | Refills: 1 | Status: SHIPPED | OUTPATIENT
Start: 2020-10-12 | End: 2021-04-30

## 2020-10-12 NOTE — TELEPHONE ENCOUNTER
Requested Prescriptions     Pending Prescriptions Disp Refills   • atorvastatin (LIPITOR) 10 MG tablet [Pharmacy Med Name: ATORVASTATIN 10 MG TABLET] 90 tablet 1     Sig: TAKE 1 TABLET BY MOUTH EVERY DAY   • carvedilol (COREG) 25 MG tablet [Pharmacy Med Name: CARVEDILOL 25 MG TABLET] 180 tablet 1     Sig: TAKE 1 TABLET BY MOUTH TWICE A DAY WITH MEALS

## 2020-10-20 ENCOUNTER — LAB (OUTPATIENT)
Dept: FAMILY MEDICINE CLINIC | Facility: CLINIC | Age: 55
End: 2020-10-20

## 2020-10-20 DIAGNOSIS — E78.2 MIXED HYPERLIPIDEMIA: ICD-10-CM

## 2020-10-20 DIAGNOSIS — I10 ESSENTIAL HYPERTENSION: Primary | ICD-10-CM

## 2020-10-21 DIAGNOSIS — R79.9 ABNORMAL BLOOD CHEMISTRY: Primary | ICD-10-CM

## 2020-10-21 LAB
ERYTHROCYTE [DISTWIDTH] IN BLOOD BY AUTOMATED COUNT: 13 % (ref 12.3–15.4)
HCT VFR BLD AUTO: 51.2 % (ref 37.5–51)
HGB BLD-MCNC: 17.4 G/DL (ref 13–17.7)
MCH RBC QN AUTO: 29.6 PG (ref 26.6–33)
MCHC RBC AUTO-ENTMCNC: 34 G/DL (ref 31.5–35.7)
MCV RBC AUTO: 87.2 FL (ref 79–97)
PLATELET # BLD AUTO: 133 10*3/MM3 (ref 140–450)
RBC # BLD AUTO: 5.87 10*6/MM3 (ref 4.14–5.8)
WBC # BLD AUTO: 5.58 10*3/MM3 (ref 3.4–10.8)
WRITTEN AUTHORIZATION: NORMAL

## 2020-10-26 ENCOUNTER — RESULTS ENCOUNTER (OUTPATIENT)
Dept: FAMILY MEDICINE CLINIC | Facility: CLINIC | Age: 55
End: 2020-10-26

## 2020-10-26 DIAGNOSIS — R79.9 ABNORMAL BLOOD CHEMISTRY: ICD-10-CM

## 2020-11-16 RX ORDER — METHYLPREDNISOLONE 4 MG/1
TABLET ORAL
Qty: 21 TABLET | Refills: 0 | Status: SHIPPED | OUTPATIENT
Start: 2020-11-16 | End: 2020-12-08

## 2020-11-16 RX ORDER — AZITHROMYCIN 250 MG/1
TABLET, FILM COATED ORAL
Qty: 6 TABLET | Refills: 0 | Status: SHIPPED | OUTPATIENT
Start: 2020-11-16 | End: 2020-12-08

## 2020-11-16 NOTE — TELEPHONE ENCOUNTER
PATIENTS WIFE CALLED STATING THE PATIENT TESTED POSITIVE FOR COVID THIS AM.  PATIENT HAS A COUGH, CONGESTION AND TEMP.  HE WOULD LIKE TO KNOW IF HE NEEDS TO TAKE MEDICATION.      PLEASE CALL AND ADVISE  [phone]

## 2020-11-18 ENCOUNTER — HOSPITAL ENCOUNTER (EMERGENCY)
Age: 55
Discharge: HOME | End: 2020-11-18
Attending: EMERGENCY MEDICINE
Payer: COMMERCIAL

## 2020-11-18 ENCOUNTER — APPOINTMENT (OUTPATIENT)
Dept: GENERAL RADIOLOGY | Age: 55
End: 2020-11-18
Payer: COMMERCIAL

## 2020-11-18 VITALS
HEIGHT: 78 IN | WEIGHT: 260 LBS | RESPIRATION RATE: 17 BRPM | TEMPERATURE: 100.2 F | HEART RATE: 91 BPM | OXYGEN SATURATION: 94 % | BODY MASS INDEX: 30.08 KG/M2 | SYSTOLIC BLOOD PRESSURE: 128 MMHG | DIASTOLIC BLOOD PRESSURE: 84 MMHG

## 2020-11-18 PROCEDURE — 99283 EMERGENCY DEPT VISIT LOW MDM: CPT

## 2020-11-18 PROCEDURE — 71045 X-RAY EXAM CHEST 1 VIEW: CPT

## 2020-11-18 RX ORDER — ATORVASTATIN CALCIUM 10 MG/1
10 TABLET, FILM COATED ORAL DAILY
COMMUNITY

## 2020-11-18 RX ORDER — METHYLPREDNISOLONE 4 MG/1
4 TABLET ORAL SEE ADMIN INSTRUCTIONS
Status: ON HOLD | COMMUNITY
End: 2020-11-28 | Stop reason: HOSPADM

## 2020-11-18 RX ORDER — ESOMEPRAZOLE MAGNESIUM 20 MG/1
20 FOR SUSPENSION ORAL DAILY
COMMUNITY

## 2020-11-18 RX ORDER — CARVEDILOL 25 MG/1
25 TABLET ORAL 2 TIMES DAILY WITH MEALS
COMMUNITY

## 2020-11-18 RX ORDER — AZITHROMYCIN 250 MG/1
250 TABLET, FILM COATED ORAL DAILY
Status: ON HOLD | COMMUNITY
End: 2020-11-28 | Stop reason: HOSPADM

## 2020-11-18 RX ORDER — ALBUTEROL SULFATE 90 UG/1
2 AEROSOL, METERED RESPIRATORY (INHALATION) 4 TIMES DAILY PRN
Qty: 1 INHALER | Refills: 1 | Status: ON HOLD | OUTPATIENT
Start: 2020-11-18 | End: 2020-11-28 | Stop reason: SDUPTHER

## 2020-11-18 SDOH — HEALTH STABILITY: MENTAL HEALTH: HOW OFTEN DO YOU HAVE A DRINK CONTAINING ALCOHOL?: NEVER

## 2020-11-18 ASSESSMENT — ENCOUNTER SYMPTOMS
SHORTNESS OF BREATH: 1
COUGH: 1
WHEEZING: 0

## 2020-11-19 ENCOUNTER — TELEPHONE (OUTPATIENT)
Dept: FAMILY MEDICINE CLINIC | Facility: CLINIC | Age: 55
End: 2020-11-19

## 2020-11-19 ENCOUNTER — CARE COORDINATION (OUTPATIENT)
Dept: CARE COORDINATION | Age: 55
End: 2020-11-19

## 2020-11-19 NOTE — TELEPHONE ENCOUNTER
I saw where he was covid pos and went to the [facility]--just ask him if he is  Doing ok and needs anything?

## 2020-11-19 NOTE — TELEPHONE ENCOUNTER
He has covid and pneumo and they gave inh and he is taking the  meds I did early this week and he states he is doing better

## 2020-11-19 NOTE — ED TRIAGE NOTES
Fever, cough, chills, fatigue, sob since Friday. Diagnosed Monday positive covid. Pt took tylenol 500mg about 1800. Took ibuprofen 400mg at 1000.

## 2020-11-19 NOTE — ED PROVIDER NOTES
[facility] EMERGENCY DEPT  eMERGENCY dEPARTMENT eNCOUnter      Pt Name: Akbar Casey  MRN: [id number]  [place] 1965  Date of evaluation: 11/18/2020  Provider: Curt Luther MD    [address] [place] [address]       Chief Complaint   Patient presents with    Concern For COVID-19     difficulty breathing, cough, and fatigue x 6 days. positive test on 11/16/2020         HISTORY OF PRESENT ILLNESS   (Location/Symptom, Timing/Onset,Context/Setting, Quality, Duration, Modifying Factors, Severity)  Note limiting factors. Akbar Casey is a 60 y.o. male who presents to the emergency department      The history is provided by the patient. Cough   Cough characteristics:  Dry  Severity:  Moderate  Onset quality:  Sudden  Duration:  6 days  Timing:  Intermittent  Chronicity:  New  Smoker: no    Context comment:  Covid positive, felt he was doing better, fevers stopped 2 days ago - started again today. Relieved by:  Nothing  Worsened by:  Nothing  Ineffective treatments: Zmax, Medrol. Associated symptoms: fever and shortness of breath (at times)    Associated symptoms: no chest pain, no chills, no diaphoresis, no headaches, no myalgias and no wheezing        NursingNotes were reviewed. REVIEW OF SYSTEMS    (2-9 systems for level 4, 10 or more for level 5)     Review of Systems   Constitutional: Positive for fever. Negative for chills and diaphoresis. Respiratory: Positive for cough and shortness of breath (at times). Negative for wheezing. Cardiovascular: Negative for chest pain. Musculoskeletal: Negative for myalgias. Neurological: Negative for headaches. All other systems reviewed and are negative. Except as noted above the remainder of the review of systems was reviewed and negative.        PAST MEDICAL HISTORY     Past Medical History:   Diagnosis Date    GERD (gastroesophageal reflux disease)     Hyperlipidemia     Hypertension          SURGICALHISTORY       Past Surgical History:   Procedure Laterality Date    HERNIA REPAIR           CURRENT MEDICATIONS       Discharge Medication List as of 11/18/2020  9:17 PM      CONTINUE these medications which have NOT CHANGED    Details   methylPREDNISolone (MEDROL DOSEPACK) 4 MG tablet Take 4 mg by mouth See Admin Instructions Take by mouth. Historical Med      carvedilol (COREG) 25 MG tablet Take 25 mg by mouth 2 times daily (with meals)Historical Med      esomeprazole Magnesium (NEXIUM) 20 MG PACK Take 20 mg by mouth dailyHistorical Med      atorvastatin (LIPITOR) 10 MG tablet Take 10 mg by mouth dailyHistorical Med      azithromycin (ZITHROMAX) 250 MG tablet Take 250 mg by mouth dailyHistorical Med             ALLERGIES     Patient has no known allergies. FAMILY HISTORY     History reviewed. No pertinent family history.        SOCIAL HISTORY       Social History     Socioeconomic History    Marital status:      Spouse name: None    Number of children: None    Years of education: None    Highest education level: None   Occupational History    None   Social Needs    Financial resource strain: None    Food insecurity     Worry: None     Inability: None    Transportation needs     Medical: None     Non-medical: None   Tobacco Use    Smoking status: Never Smoker    Smokeless tobacco: Never Used   Substance and Sexual Activity    Alcohol use: Never     Frequency: Never    Drug use: Never    Sexual activity: None   Lifestyle    Physical activity     Days per week: None     Minutes per session: None    Stress: None   Relationships    Social connections     Talks on phone: None     Gets together: None     Attends Yarsani service: None     Active member of club or organization: None     Attends meetings of clubs or organizations: None     Relationship status: None    Intimate partner violence     Fear of current or ex partner: None     Emotionally abused: None     Physically abused: None     Forced sexual activity: None   Other Topics Concern    None Social History Narrative    None       SCREENINGS      @FLOW([id number])@      PHYSICAL EXAM    (up to 7 for level 4, 8 or more for level 5)     ED Triage Vitals [11/18/20 2017]   BP Temp Temp src Pulse Resp SpO2 Height Weight   (!) 140/104 100.2 °F (37.9 °C) -- 101 20 94 % 6' 7\" (2.007 m) 260 lb (117.9 kg)       Physical Exam  Vitals signs and nursing note reviewed. Constitutional:       General: He is not in acute distress. Appearance: Normal appearance. He is normal weight. He is not ill-appearing, toxic-appearing or diaphoretic. Comments: Using smart device   HENT:      Nose: Nose normal.      Mouth/Throat:      Mouth: Mucous membranes are moist.      Pharynx: Oropharynx is clear. Eyes:      Conjunctiva/sclera: Conjunctivae normal.   Neck:      Musculoskeletal: Normal range of motion and neck supple. Cardiovascular:      Rate and Rhythm: Normal rate and regular rhythm. Heart sounds: Normal heart sounds. Pulmonary:      Effort: Pulmonary effort is normal.      Breath sounds: Normal breath sounds. Musculoskeletal:      Right lower leg: No edema. Left lower leg: No edema. Skin:     General: Skin is warm and dry. Neurological:      General: No focal deficit present. Mental Status: He is alert and oriented to person, place, and time. Cranial Nerves: No cranial nerve deficit. Psychiatric:         Mood and Affect: Mood normal.         DIAGNOSTIC RESULTS     EKG: All EKG's are interpreted by the Emergency Department Physician who either signs or Co-signsthis chart in the absence of a cardiologist.        RADIOLOGY:   Marine Conception such as CT, Ultrasound and MRI are read by the radiologist. Plain radiographic images are visualized and preliminarily interpreted by the emergency physician with the below findings:        Interpretation per the Radiologist below, if available at the time ofthis note:    XR CHEST PORTABLE   Final Result   .  Subtle peripheral infiltrate within the left upper lobe. Signed by Dr Nikki Ye on 11/18/2020 9:11 PM            ED BEDSIDE ULTRASOUND:   Performed by ED Physician - none    LABS:  Labs Reviewed - No data to display    All other labs were within normal range or not returned as of this dictation. EMERGENCY DEPARTMENT COURSE and DIFFERENTIAL DIAGNOSIS/MDM:   Vitals:    Vitals:    11/18/20 2017 11/18/20 2030 11/18/20 2100   BP: (!) 140/104 111/89 128/84   Pulse: 101  91   Resp: 20  17   Temp: 100.2 °F (37.9 °C)     SpO2: 94% 94% 94%   Weight: 260 lb (117.9 kg)     Height: 6' 7\" (2.007 m)             MDM    CRITICAL CARE TIME   Total Critical Care time was 0 minutes, excluding separately reportable procedures. There was a high probability of clinically significant/lifethreatening deterioration in the patient's condition which required my urgent intervention. CONSULTS:  None    PROCEDURES:  Unless otherwise noted below, none     Procedures    FINAL IMPRESSION      1. COVID-19 virus infection    2. Pneumonitis          DISPOSITION/PLAN   DISPOSITION        PATIENT REFERRED TO:  Myrna Zendejas MD  [facility]  [phone]      As needed. Finish Zithromax.       DISCHARGE MEDICATIONS:  Discharge Medication List as of 11/18/2020  9:17 PM      START taking these medications    Details   albuterol sulfate HFA (VENTOLIN HFA) 108 (90 Base) MCG/ACT inhaler Inhale 2 puffs into the lungs 4 times daily as needed for Shortness of Breath, Disp-1 Inhaler,R-1Print                (Please note that portions of this note were completed with a voice recognition program.  Efforts were made to edit the dictations but occasionally words are mis-transcribed.)    Clint Cheney MD (electronically signed)  Attending Emergency Physician          Clint Cheney MD  11/18/20 [id number]

## 2020-11-19 NOTE — CARE COORDINATION
Attempted to reach pt via phone call for ED f/u. Multiple unsuccessful attempts to reach pt. Will close episode and reopen if pt returns call.

## 2020-11-20 ENCOUNTER — TELEPHONE (OUTPATIENT)
Dept: FAMILY MEDICINE CLINIC | Facility: CLINIC | Age: 55
End: 2020-11-20

## 2020-11-20 NOTE — TELEPHONE ENCOUNTER
PATIENT'S WIFE WANTS TO KNOW CAN  NURSE CALL HER. HER  TESTED POSITIVE FOR COVID.  \    PATIENT HAS A HIGH FEVER AND HE BEEN TAKE TYLENOL BUT NOTHING IS BRINGING THE FEVER DOWN. IT DOESN'T STAY DOWN FOR LONG. IS THERE ANYTHING ELSE SHE CAN PRESCRIBE.         CALL BACK: [phone]     14-Jun-2019

## 2020-11-22 ENCOUNTER — HOSPITAL ENCOUNTER (INPATIENT)
Age: 55
LOS: 6 days | Discharge: HOME HEALTH | DRG: 177 | End: 2020-11-28
Attending: EMERGENCY MEDICINE | Admitting: HOSPITALIST
Payer: COMMERCIAL

## 2020-11-22 ENCOUNTER — APPOINTMENT (OUTPATIENT)
Dept: GENERAL RADIOLOGY | Age: 55
DRG: 177 | End: 2020-11-22
Payer: COMMERCIAL

## 2020-11-22 PROBLEM — R06.00 DYSPNEA DUE TO COVID-19: Status: ACTIVE | Noted: 2020-11-22

## 2020-11-22 PROBLEM — R09.02 HYPOXIA: Status: ACTIVE | Noted: 2020-11-22

## 2020-11-22 PROBLEM — K21.9 GERD (GASTROESOPHAGEAL REFLUX DISEASE): Status: ACTIVE | Noted: 2020-11-22

## 2020-11-22 PROBLEM — U07.1 DYSPNEA DUE TO COVID-19: Status: ACTIVE | Noted: 2020-11-22

## 2020-11-22 PROBLEM — J12.82 PNEUMONIA DUE TO COVID-19 VIRUS: Status: ACTIVE | Noted: 2020-11-22

## 2020-11-22 PROBLEM — U07.1 PNEUMONIA DUE TO COVID-19 VIRUS: Status: ACTIVE | Noted: 2020-11-22

## 2020-11-22 PROBLEM — J96.01 ACUTE RESPIRATORY FAILURE WITH HYPOXIA (HCC): Status: ACTIVE | Noted: 2020-11-22

## 2020-11-22 PROBLEM — E78.5 DYSLIPIDEMIA: Status: ACTIVE | Noted: 2020-11-22

## 2020-11-22 LAB
ALBUMIN SERPL-MCNC: 3.6 G/DL (ref 3.5–5.2)
ALP BLD-CCNC: 56 U/L (ref 40–130)
ALT SERPL-CCNC: 30 U/L (ref 5–41)
ANION GAP SERPL CALCULATED.3IONS-SCNC: 10 MMOL/L (ref 7–19)
AST SERPL-CCNC: 27 U/L (ref 5–40)
BACTERIA: NEGATIVE /HPF
BASE EXCESS ARTERIAL: 2.3 MMOL/L (ref -2–2)
BASOPHILS ABSOLUTE: 0 K/UL (ref 0–0.2)
BASOPHILS RELATIVE PERCENT: 0 % (ref 0–1)
BILIRUB SERPL-MCNC: 0.5 MG/DL (ref 0.2–1.2)
BILIRUBIN URINE: NEGATIVE
BLOOD, URINE: NEGATIVE
BUN BLDV-MCNC: 19 MG/DL (ref 6–20)
CALCIUM SERPL-MCNC: 8.2 MG/DL (ref 8.6–10)
CARBOXYHEMOGLOBIN ARTERIAL: 2.2 % (ref 0–5)
CHLORIDE BLD-SCNC: 93 MMOL/L (ref 98–111)
CLARITY: CLEAR
CO2: 29 MMOL/L (ref 22–29)
COLOR: ABNORMAL
CREAT SERPL-MCNC: 1.1 MG/DL (ref 0.5–1.2)
CRYSTALS, UA: ABNORMAL /HPF
D DIMER: 0.57 UG/ML FEU (ref 0–0.48)
D DIMER: 0.67 UG/ML FEU (ref 0–0.48)
EOSINOPHILS ABSOLUTE: 0 K/UL (ref 0–0.6)
EOSINOPHILS RELATIVE PERCENT: 0 % (ref 0–5)
EPITHELIAL CELLS, UA: 1 /HPF (ref 0–5)
FIBRINOGEN: 711 MG/DL (ref 238–505)
FIBRINOGEN: 744 MG/DL (ref 238–505)
GFR AFRICAN AMERICAN: >59
GFR NON-AFRICAN AMERICAN: >60
GLUCOSE BLD-MCNC: 109 MG/DL (ref 74–109)
GLUCOSE URINE: NEGATIVE MG/DL
HCO3 ARTERIAL: 25.3 MMOL/L (ref 22–26)
HCT VFR BLD CALC: 49 % (ref 42–52)
HEMOGLOBIN, ART, EXTENDED: 16.9 G/DL (ref 14–18)
HEMOGLOBIN: 16.5 G/DL (ref 14–18)
HYALINE CASTS: 2 /HPF (ref 0–8)
IMMATURE GRANULOCYTES #: 0.1 K/UL
KETONES, URINE: 15 MG/DL
LACTIC ACID: 1.3 MMOL/L (ref 0.5–1.9)
LEUKOCYTE ESTERASE, URINE: ABNORMAL
LYMPHOCYTES ABSOLUTE: 1.1 K/UL (ref 1.1–4.5)
LYMPHOCYTES RELATIVE PERCENT: 14 % (ref 20–40)
MCH RBC QN AUTO: 29.6 PG (ref 27–31)
MCHC RBC AUTO-ENTMCNC: 33.7 G/DL (ref 33–37)
MCV RBC AUTO: 87.8 FL (ref 80–94)
METHEMOGLOBIN ARTERIAL: 1.2 %
MONOCYTES ABSOLUTE: 0.7 K/UL (ref 0–0.9)
MONOCYTES RELATIVE PERCENT: 8.4 % (ref 0–10)
NEUTROPHILS ABSOLUTE: 6.3 K/UL (ref 1.5–7.5)
NEUTROPHILS RELATIVE PERCENT: 76.6 % (ref 50–65)
NITRITE, URINE: NEGATIVE
O2 CONTENT ARTERIAL: 20.6 ML/DL
O2 SAT, ARTERIAL: 87.2 %
O2 THERAPY: ABNORMAL
PCO2 ARTERIAL: 34 MMHG (ref 35–45)
PDW BLD-RTO: 12.6 % (ref 11.5–14.5)
PH ARTERIAL: 7.48 (ref 7.35–7.45)
PH UA: 6 (ref 5–8)
PLATELET # BLD: 131 K/UL (ref 130–400)
PMV BLD AUTO: 10.4 FL (ref 9.4–12.4)
PO2 ARTERIAL: 51 MMHG (ref 80–100)
POTASSIUM SERPL-SCNC: 4 MMOL/L (ref 3.5–5)
POTASSIUM, WHOLE BLOOD: 3.9
PROTEIN UA: 30 MG/DL
RBC # BLD: 5.58 M/UL (ref 4.7–6.1)
RBC UA: 3 /HPF (ref 0–4)
SODIUM BLD-SCNC: 132 MMOL/L (ref 136–145)
SPECIFIC GRAVITY UA: 1.03 (ref 1–1.03)
TOTAL PROTEIN: 6.9 G/DL (ref 6.6–8.7)
UROBILINOGEN, URINE: 1 E.U./DL
VITAMIN D 25-HYDROXY: 51.2 NG/ML
WBC # BLD: 8.2 K/UL (ref 4.8–10.8)
WBC UA: 3 /HPF (ref 0–5)

## 2020-11-22 PROCEDURE — 6370000000 HC RX 637 (ALT 250 FOR IP): Performed by: HOSPITALIST

## 2020-11-22 PROCEDURE — 1210000000 HC MED SURG R&B

## 2020-11-22 PROCEDURE — XW033E5 INTRODUCTION OF REMDESIVIR ANTI-INFECTIVE INTO PERIPHERAL VEIN, PERCUTANEOUS APPROACH, NEW TECHNOLOGY GROUP 5: ICD-10-PCS | Performed by: INTERNAL MEDICINE

## 2020-11-22 PROCEDURE — 6360000002 HC RX W HCPCS: Performed by: EMERGENCY MEDICINE

## 2020-11-22 PROCEDURE — 6360000002 HC RX W HCPCS: Performed by: HOSPITALIST

## 2020-11-22 PROCEDURE — 93005 ELECTROCARDIOGRAM TRACING: CPT | Performed by: EMERGENCY MEDICINE

## 2020-11-22 PROCEDURE — 85025 COMPLETE CBC W/AUTO DIFF WBC: CPT

## 2020-11-22 PROCEDURE — 36600 WITHDRAWAL OF ARTERIAL BLOOD: CPT

## 2020-11-22 PROCEDURE — 83605 ASSAY OF LACTIC ACID: CPT

## 2020-11-22 PROCEDURE — 85384 FIBRINOGEN ACTIVITY: CPT

## 2020-11-22 PROCEDURE — 82306 VITAMIN D 25 HYDROXY: CPT

## 2020-11-22 PROCEDURE — 36415 COLL VENOUS BLD VENIPUNCTURE: CPT

## 2020-11-22 PROCEDURE — 80053 COMPREHEN METABOLIC PANEL: CPT

## 2020-11-22 PROCEDURE — 2060000000 HC ICU INTERMEDIATE R&B

## 2020-11-22 PROCEDURE — 71045 X-RAY EXAM CHEST 1 VIEW: CPT

## 2020-11-22 PROCEDURE — 99285 EMERGENCY DEPT VISIT HI MDM: CPT

## 2020-11-22 PROCEDURE — 84132 ASSAY OF SERUM POTASSIUM: CPT

## 2020-11-22 PROCEDURE — 2580000003 HC RX 258: Performed by: HOSPITALIST

## 2020-11-22 PROCEDURE — 2700000000 HC OXYGEN THERAPY PER DAY

## 2020-11-22 PROCEDURE — 82803 BLOOD GASES ANY COMBINATION: CPT

## 2020-11-22 PROCEDURE — 96374 THER/PROPH/DIAG INJ IV PUSH: CPT

## 2020-11-22 PROCEDURE — 85379 FIBRIN DEGRADATION QUANT: CPT

## 2020-11-22 PROCEDURE — 81001 URINALYSIS AUTO W/SCOPE: CPT

## 2020-11-22 PROCEDURE — 87040 BLOOD CULTURE FOR BACTERIA: CPT

## 2020-11-22 RX ORDER — PANTOPRAZOLE SODIUM 40 MG/1
40 TABLET, DELAYED RELEASE ORAL
Status: DISCONTINUED | OUTPATIENT
Start: 2020-11-23 | End: 2020-11-28 | Stop reason: HOSPADM

## 2020-11-22 RX ORDER — ACETAMINOPHEN 325 MG/1
650 TABLET ORAL EVERY 6 HOURS PRN
Status: DISCONTINUED | OUTPATIENT
Start: 2020-11-22 | End: 2020-11-28 | Stop reason: HOSPADM

## 2020-11-22 RX ORDER — BUDESONIDE AND FORMOTEROL FUMARATE DIHYDRATE 160; 4.5 UG/1; UG/1
2 AEROSOL RESPIRATORY (INHALATION) 2 TIMES DAILY
Status: DISCONTINUED | OUTPATIENT
Start: 2020-11-22 | End: 2020-11-25

## 2020-11-22 RX ORDER — PROMETHAZINE HYDROCHLORIDE 12.5 MG/1
12.5 TABLET ORAL EVERY 6 HOURS PRN
Status: DISCONTINUED | OUTPATIENT
Start: 2020-11-22 | End: 2020-11-28 | Stop reason: HOSPADM

## 2020-11-22 RX ORDER — SODIUM CHLORIDE 0.9 % (FLUSH) 0.9 %
10 SYRINGE (ML) INJECTION PRN
Status: DISCONTINUED | OUTPATIENT
Start: 2020-11-22 | End: 2020-11-28 | Stop reason: HOSPADM

## 2020-11-22 RX ORDER — POTASSIUM CHLORIDE 7.45 MG/ML
10 INJECTION INTRAVENOUS PRN
Status: DISCONTINUED | OUTPATIENT
Start: 2020-11-22 | End: 2020-11-28 | Stop reason: HOSPADM

## 2020-11-22 RX ORDER — POLYETHYLENE GLYCOL 3350 17 G/17G
17 POWDER, FOR SOLUTION ORAL DAILY PRN
Status: DISCONTINUED | OUTPATIENT
Start: 2020-11-22 | End: 2020-11-28 | Stop reason: HOSPADM

## 2020-11-22 RX ORDER — ACETAMINOPHEN 650 MG/1
650 SUPPOSITORY RECTAL EVERY 6 HOURS PRN
Status: DISCONTINUED | OUTPATIENT
Start: 2020-11-22 | End: 2020-11-23 | Stop reason: SDUPTHER

## 2020-11-22 RX ORDER — FAMOTIDINE 20 MG/1
20 TABLET, FILM COATED ORAL 2 TIMES DAILY
Status: DISCONTINUED | OUTPATIENT
Start: 2020-11-22 | End: 2020-11-28 | Stop reason: HOSPADM

## 2020-11-22 RX ORDER — ALBUTEROL SULFATE 90 UG/1
2 AEROSOL, METERED RESPIRATORY (INHALATION) EVERY 4 HOURS PRN
Status: DISCONTINUED | OUTPATIENT
Start: 2020-11-22 | End: 2020-11-25

## 2020-11-22 RX ORDER — SODIUM CHLORIDE 0.9 % (FLUSH) 0.9 %
10 SYRINGE (ML) INJECTION EVERY 12 HOURS SCHEDULED
Status: DISCONTINUED | OUTPATIENT
Start: 2020-11-22 | End: 2020-11-28 | Stop reason: HOSPADM

## 2020-11-22 RX ORDER — VITAMIN B COMPLEX
6000 TABLET ORAL DAILY
Status: DISCONTINUED | OUTPATIENT
Start: 2020-11-22 | End: 2020-11-28 | Stop reason: HOSPADM

## 2020-11-22 RX ORDER — ESOMEPRAZOLE MAGNESIUM 20 MG/1
20 FOR SUSPENSION ORAL DAILY
Status: DISCONTINUED | OUTPATIENT
Start: 2020-11-22 | End: 2020-11-22 | Stop reason: CLARIF

## 2020-11-22 RX ORDER — POTASSIUM CHLORIDE 20 MEQ/1
40 TABLET, EXTENDED RELEASE ORAL PRN
Status: DISCONTINUED | OUTPATIENT
Start: 2020-11-22 | End: 2020-11-28 | Stop reason: HOSPADM

## 2020-11-22 RX ORDER — DEXAMETHASONE SODIUM PHOSPHATE 10 MG/ML
6 INJECTION, SOLUTION INTRAMUSCULAR; INTRAVENOUS ONCE
Status: COMPLETED | OUTPATIENT
Start: 2020-11-22 | End: 2020-11-22

## 2020-11-22 RX ORDER — DEXAMETHASONE SODIUM PHOSPHATE 4 MG/ML
6 INJECTION, SOLUTION INTRA-ARTICULAR; INTRALESIONAL; INTRAMUSCULAR; INTRAVENOUS; SOFT TISSUE EVERY 24 HOURS
Status: DISCONTINUED | OUTPATIENT
Start: 2020-11-23 | End: 2020-11-28 | Stop reason: HOSPADM

## 2020-11-22 RX ORDER — 0.9 % SODIUM CHLORIDE 0.9 %
30 INTRAVENOUS SOLUTION INTRAVENOUS PRN
Status: DISCONTINUED | OUTPATIENT
Start: 2020-11-22 | End: 2020-11-28 | Stop reason: HOSPADM

## 2020-11-22 RX ORDER — ASCORBIC ACID 500 MG
500 TABLET ORAL 3 TIMES DAILY
Status: DISCONTINUED | OUTPATIENT
Start: 2020-11-22 | End: 2020-11-28 | Stop reason: HOSPADM

## 2020-11-22 RX ORDER — VITAMIN B COMPLEX
2000 TABLET ORAL DAILY
Status: DISCONTINUED | OUTPATIENT
Start: 2020-11-22 | End: 2020-11-23

## 2020-11-22 RX ORDER — CARVEDILOL 12.5 MG/1
25 TABLET ORAL 2 TIMES DAILY WITH MEALS
Status: DISCONTINUED | OUTPATIENT
Start: 2020-11-22 | End: 2020-11-28 | Stop reason: HOSPADM

## 2020-11-22 RX ORDER — ACETAMINOPHEN 650 MG/1
650 SUPPOSITORY RECTAL EVERY 6 HOURS PRN
Status: DISCONTINUED | OUTPATIENT
Start: 2020-11-22 | End: 2020-11-28 | Stop reason: HOSPADM

## 2020-11-22 RX ORDER — ONDANSETRON 2 MG/ML
4 INJECTION INTRAMUSCULAR; INTRAVENOUS EVERY 6 HOURS PRN
Status: DISCONTINUED | OUTPATIENT
Start: 2020-11-22 | End: 2020-11-28 | Stop reason: HOSPADM

## 2020-11-22 RX ORDER — MECOBALAMIN 5000 MCG
5 TABLET,DISINTEGRATING ORAL NIGHTLY
Status: DISCONTINUED | OUTPATIENT
Start: 2020-11-22 | End: 2020-11-23

## 2020-11-22 RX ORDER — MAGNESIUM SULFATE 1 G/100ML
1 INJECTION INTRAVENOUS PRN
Status: DISCONTINUED | OUTPATIENT
Start: 2020-11-22 | End: 2020-11-28 | Stop reason: HOSPADM

## 2020-11-22 RX ORDER — ATORVASTATIN CALCIUM 10 MG/1
10 TABLET, FILM COATED ORAL DAILY
Status: DISCONTINUED | OUTPATIENT
Start: 2020-11-23 | End: 2020-11-28 | Stop reason: HOSPADM

## 2020-11-22 RX ORDER — ACETAMINOPHEN 325 MG/1
650 TABLET ORAL EVERY 6 HOURS PRN
Status: DISCONTINUED | OUTPATIENT
Start: 2020-11-22 | End: 2020-11-23 | Stop reason: SDUPTHER

## 2020-11-22 RX ADMIN — Medication 2000 UNITS: at 23:57

## 2020-11-22 RX ADMIN — BUDESONIDE AND FORMOTEROL FUMARATE DIHYDRATE 2 PUFF: 160; 4.5 AEROSOL RESPIRATORY (INHALATION) at 23:59

## 2020-11-22 RX ADMIN — FAMOTIDINE 20 MG: 20 TABLET, FILM COATED ORAL at 23:57

## 2020-11-22 RX ADMIN — Medication 5 MG: at 23:57

## 2020-11-22 RX ADMIN — SODIUM CHLORIDE, PRESERVATIVE FREE 10 ML: 5 INJECTION INTRAVENOUS at 23:58

## 2020-11-22 RX ADMIN — OXYCODONE HYDROCHLORIDE AND ACETAMINOPHEN 500 MG: 500 TABLET ORAL at 23:57

## 2020-11-22 RX ADMIN — ENOXAPARIN SODIUM 30 MG: 30 INJECTION SUBCUTANEOUS at 23:59

## 2020-11-22 RX ADMIN — DEXAMETHASONE SODIUM PHOSPHATE 6 MG: 10 INJECTION, SOLUTION INTRAMUSCULAR; INTRAVENOUS at 16:49

## 2020-11-22 RX ADMIN — CARVEDILOL 25 MG: 12.5 TABLET, FILM COATED ORAL at 23:58

## 2020-11-22 ASSESSMENT — ENCOUNTER SYMPTOMS
NAUSEA: 0
SHORTNESS OF BREATH: 1
WHEEZING: 0
COUGH: 1
VOMITING: 0
ABDOMINAL PAIN: 0

## 2020-11-22 NOTE — ED NOTES
Pt SpO2 87% on RA at this time. Pt placed on 2L per NC.      Carlos Couch, [facility]  11/22/20 1915

## 2020-11-22 NOTE — PROGRESS NOTES
Results for Missy Kong (MRN [id number]) as of 11/22/2020 14:37   Ref.  Range 11/22/2020 14:35   Hemoglobin, Art, Extended Latest Ref Range: 14.0 - 18.0 g/dL 16.9   pH, Arterial Latest Ref Range: 7.350 - 7.450  7.480 (H)   pCO2, Arterial Latest Ref Range: 35.0 - 45.0 mmHg 34.0 (L)   pO2, Arterial Latest Ref Range: 80.0 - 100.0 mmHg 51.0 (L)   HCO3, Arterial Latest Ref Range: 22.0 - 26.0 mmol/L 25.3   Base Excess, Arterial Latest Ref Range: -2.0 - 2.0 mmol/L 2.3 (H)   O2 Sat, Arterial Latest Ref Range: >92 % 87.2 (LL)   O2 Content, Arterial Latest Ref Range: Not Established mL/dL 20.6   Methemoglobin, Arterial Latest Ref Range: <1.5 % 1.2   Carboxyhgb, Arterial Latest Ref Range: 0.0 - 5.0 % 2.2   Pt on room air, RR, AT

## 2020-11-23 LAB
ANION GAP SERPL CALCULATED.3IONS-SCNC: 12 MMOL/L (ref 7–19)
BASOPHILS ABSOLUTE: 0 K/UL (ref 0–0.2)
BASOPHILS RELATIVE PERCENT: 0.1 % (ref 0–1)
BUN BLDV-MCNC: 22 MG/DL (ref 6–20)
CALCIUM SERPL-MCNC: 7.8 MG/DL (ref 8.6–10)
CHLORIDE BLD-SCNC: 95 MMOL/L (ref 98–111)
CO2: 25 MMOL/L (ref 22–29)
CREAT SERPL-MCNC: 1 MG/DL (ref 0.5–1.2)
D DIMER: 0.56 UG/ML FEU (ref 0–0.48)
EKG P AXIS: 45 DEGREES
EKG P-R INTERVAL: 190 MS
EKG Q-T INTERVAL: 386 MS
EKG QRS DURATION: 106 MS
EKG QTC CALCULATION (BAZETT): 417 MS
EKG T AXIS: 54 DEGREES
EOSINOPHILS ABSOLUTE: 0 K/UL (ref 0–0.6)
EOSINOPHILS RELATIVE PERCENT: 0 % (ref 0–5)
FIBRINOGEN: 644 MG/DL (ref 238–505)
GFR AFRICAN AMERICAN: >59
GFR NON-AFRICAN AMERICAN: >60
GLUCOSE BLD-MCNC: 146 MG/DL (ref 74–109)
HCT VFR BLD CALC: 46 % (ref 42–52)
HEMOGLOBIN: 15.6 G/DL (ref 14–18)
IMMATURE GRANULOCYTES #: 0 K/UL
LYMPHOCYTES ABSOLUTE: 0.8 K/UL (ref 1.1–4.5)
LYMPHOCYTES RELATIVE PERCENT: 12.1 % (ref 20–40)
MAGNESIUM: 2.5 MG/DL (ref 1.6–2.6)
MCH RBC QN AUTO: 29.4 PG (ref 27–31)
MCHC RBC AUTO-ENTMCNC: 33.9 G/DL (ref 33–37)
MCV RBC AUTO: 86.6 FL (ref 80–94)
MONOCYTES ABSOLUTE: 0.6 K/UL (ref 0–0.9)
MONOCYTES RELATIVE PERCENT: 8.3 % (ref 0–10)
NEUTROPHILS ABSOLUTE: 5.3 K/UL (ref 1.5–7.5)
NEUTROPHILS RELATIVE PERCENT: 78.9 % (ref 50–65)
PDW BLD-RTO: 12.5 % (ref 11.5–14.5)
PHOSPHORUS: 3.5 MG/DL (ref 2.5–4.5)
PLATELET # BLD: 139 K/UL (ref 130–400)
PMV BLD AUTO: 10.4 FL (ref 9.4–12.4)
POTASSIUM SERPL-SCNC: 4.5 MMOL/L (ref 3.5–5)
RBC # BLD: 5.31 M/UL (ref 4.7–6.1)
SODIUM BLD-SCNC: 132 MMOL/L (ref 136–145)
WBC # BLD: 6.8 K/UL (ref 4.8–10.8)

## 2020-11-23 PROCEDURE — 6360000002 HC RX W HCPCS: Performed by: HOSPITALIST

## 2020-11-23 PROCEDURE — 93010 ELECTROCARDIOGRAM REPORT: CPT | Performed by: INTERNAL MEDICINE

## 2020-11-23 PROCEDURE — 80048 BASIC METABOLIC PNL TOTAL CA: CPT

## 2020-11-23 PROCEDURE — 1210000000 HC MED SURG R&B

## 2020-11-23 PROCEDURE — 84100 ASSAY OF PHOSPHORUS: CPT

## 2020-11-23 PROCEDURE — 85025 COMPLETE CBC W/AUTO DIFF WBC: CPT

## 2020-11-23 PROCEDURE — 85384 FIBRINOGEN ACTIVITY: CPT

## 2020-11-23 PROCEDURE — 2500000003 HC RX 250 WO HCPCS: Performed by: HOSPITALIST

## 2020-11-23 PROCEDURE — 2580000003 HC RX 258: Performed by: HOSPITALIST

## 2020-11-23 PROCEDURE — 2700000000 HC OXYGEN THERAPY PER DAY

## 2020-11-23 PROCEDURE — 85379 FIBRIN DEGRADATION QUANT: CPT

## 2020-11-23 PROCEDURE — 6370000000 HC RX 637 (ALT 250 FOR IP): Performed by: HOSPITALIST

## 2020-11-23 PROCEDURE — 2060000000 HC ICU INTERMEDIATE R&B

## 2020-11-23 PROCEDURE — 83735 ASSAY OF MAGNESIUM: CPT

## 2020-11-23 RX ORDER — ZINC SULFATE 50(220)MG
50 CAPSULE ORAL DAILY
Status: DISCONTINUED | OUTPATIENT
Start: 2020-11-23 | End: 2020-11-28 | Stop reason: HOSPADM

## 2020-11-23 RX ORDER — SODIUM CHLORIDE, SODIUM LACTATE, POTASSIUM CHLORIDE, CALCIUM CHLORIDE 600; 310; 30; 20 MG/100ML; MG/100ML; MG/100ML; MG/100ML
INJECTION, SOLUTION INTRAVENOUS CONTINUOUS
Status: DISCONTINUED | OUTPATIENT
Start: 2020-11-23 | End: 2020-11-25

## 2020-11-23 RX ORDER — MECOBALAMIN 5000 MCG
15 TABLET,DISINTEGRATING ORAL NIGHTLY
Status: DISCONTINUED | OUTPATIENT
Start: 2020-11-24 | End: 2020-11-28 | Stop reason: HOSPADM

## 2020-11-23 RX ORDER — MECOBALAMIN 5000 MCG
10 TABLET,DISINTEGRATING ORAL NIGHTLY
Status: COMPLETED | OUTPATIENT
Start: 2020-11-23 | End: 2020-11-23

## 2020-11-23 RX ADMIN — Medication 50 MG: at 09:18

## 2020-11-23 RX ADMIN — Medication 2000 UNITS: at 08:41

## 2020-11-23 RX ADMIN — SODIUM CHLORIDE, PRESERVATIVE FREE 10 ML: 5 INJECTION INTRAVENOUS at 16:39

## 2020-11-23 RX ADMIN — OXYCODONE HYDROCHLORIDE AND ACETAMINOPHEN 500 MG: 500 TABLET ORAL at 21:36

## 2020-11-23 RX ADMIN — BUDESONIDE AND FORMOTEROL FUMARATE DIHYDRATE 2 PUFF: 160; 4.5 AEROSOL RESPIRATORY (INHALATION) at 21:31

## 2020-11-23 RX ADMIN — SODIUM CHLORIDE, POTASSIUM CHLORIDE, SODIUM LACTATE AND CALCIUM CHLORIDE: 600; 310; 30; 20 INJECTION, SOLUTION INTRAVENOUS at 22:59

## 2020-11-23 RX ADMIN — FAMOTIDINE 20 MG: 20 TABLET, FILM COATED ORAL at 21:32

## 2020-11-23 RX ADMIN — ENOXAPARIN SODIUM 30 MG: 30 INJECTION SUBCUTANEOUS at 08:42

## 2020-11-23 RX ADMIN — SODIUM CHLORIDE, PRESERVATIVE FREE 10 ML: 5 INJECTION INTRAVENOUS at 21:30

## 2020-11-23 RX ADMIN — REMDESIVIR 200 MG: 100 INJECTION, POWDER, LYOPHILIZED, FOR SOLUTION INTRAVENOUS at 00:00

## 2020-11-23 RX ADMIN — Medication 10 MG: at 22:59

## 2020-11-23 RX ADMIN — OXYCODONE HYDROCHLORIDE AND ACETAMINOPHEN 500 MG: 500 TABLET ORAL at 08:41

## 2020-11-23 RX ADMIN — REMDESIVIR 100 MG: 100 INJECTION, POWDER, LYOPHILIZED, FOR SOLUTION INTRAVENOUS at 22:59

## 2020-11-23 RX ADMIN — OXYCODONE HYDROCHLORIDE AND ACETAMINOPHEN 500 MG: 500 TABLET ORAL at 16:39

## 2020-11-23 RX ADMIN — ATORVASTATIN CALCIUM 10 MG: 10 TABLET, FILM COATED ORAL at 08:41

## 2020-11-23 RX ADMIN — SODIUM CHLORIDE 30 ML: 9 INJECTION, SOLUTION INTRAVENOUS at 22:59

## 2020-11-23 RX ADMIN — FAMOTIDINE 20 MG: 20 TABLET, FILM COATED ORAL at 08:41

## 2020-11-23 RX ADMIN — PANTOPRAZOLE SODIUM 40 MG: 40 TABLET, DELAYED RELEASE ORAL at 09:18

## 2020-11-23 RX ADMIN — SODIUM CHLORIDE, SODIUM LACTATE, POTASSIUM CHLORIDE, AND CALCIUM CHLORIDE: 600; 310; 30; 20 INJECTION, SOLUTION INTRAVENOUS at 00:01

## 2020-11-23 RX ADMIN — ENOXAPARIN SODIUM 30 MG: 30 INJECTION SUBCUTANEOUS at 21:30

## 2020-11-23 RX ADMIN — BUDESONIDE AND FORMOTEROL FUMARATE DIHYDRATE 2 PUFF: 160; 4.5 AEROSOL RESPIRATORY (INHALATION) at 08:40

## 2020-11-23 RX ADMIN — DEXAMETHASONE SODIUM PHOSPHATE 6 MG: 4 INJECTION, SOLUTION INTRAMUSCULAR; INTRAVENOUS at 16:39

## 2020-11-23 RX ADMIN — CARVEDILOL 25 MG: 12.5 TABLET, FILM COATED ORAL at 16:39

## 2020-11-23 RX ADMIN — Medication 5 MG: at 21:32

## 2020-11-23 RX ADMIN — CARVEDILOL 25 MG: 12.5 TABLET, FILM COATED ORAL at 08:41

## 2020-11-23 NOTE — H&P
[facility] [address]        DEPARTMENT OF HOSPITALIST MEDICINE        HISTORY & PHYSICAL:          REASON FOR ADMISSION:  Chief Complaint   Patient presents with    Shortness of Breath     covid pos, sob increased this AM        HISTORY OF PRESENT ILLNESS:  Travis Drake is an 60 y.o. male. He is a very pleasant gentleman who was diagnosed with COVID-19 pneumonia in our ER 4 days ago when he presented with symptoms for about 6 days. He was stable and not requiring any oxygen hence he was sent home with instruction to self quarantine. He came back to the ER for evaluation with complaints of worsening shortness of breath since this morning. He was satting 86% on room air. He was given oxygen via nasal cannula and his O2 sats improved to 90s. Chest x-ray was done which showed worsening of the pulmonary findings as compared to the x-ray 4 days ago. Given his worsening symptoms and advanced pulmonary changes, he is being admitted for medical management, IV steroids, IV remdesivir, adjuvant medications and close management and hydration in the COVID-19 unit.     PAST MEDICAL HISTORY:  Past Medical History:   Diagnosis Date    GERD (gastroesophageal reflux disease)     Hyperlipidemia     Hypertension          PAST SURGICAL HISTORY:  Past Surgical History:   Procedure Laterality Date    HERNIA REPAIR          SOCIAL HISTORY:  Social History     Socioeconomic History    Marital status:      Spouse name: None    Number of children: None    Years of education: None    Highest education level: None   Occupational History    None   Social Needs    Financial resource strain: None    Food insecurity     Worry: None     Inability: None    Transportation needs     Medical: None     Non-medical: None   Tobacco Use    Smoking status: Never Smoker    Smokeless tobacco: Never Used   Substance and Sexual Activity    Alcohol use: Never     Frequency: Never    Drug use: Never    Sexual activity: None   Lifestyle    Physical activity     Days per week: None     Minutes per session: None    Stress: None   Relationships    Social connections     Talks on phone: None     Gets together: None     Attends Taoism service: None     Active member of club or organization: None     Attends meetings of clubs or organizations: None     Relationship status: None    Intimate partner violence     Fear of current or ex partner: None     Emotionally abused: None     Physically abused: None     Forced sexual activity: None   Other Topics Concern    None   Social History Narrative    None        FAMILY HISTORY:  History reviewed. No pertinent family history. ALLERGIES:  No Known Allergies     PRIOR TO ADMISSION MEDS:  Not in a hospital admission. REVIEW OF SYSTEMS:  Constitutional:  + Low-grade fevers, no chills, +nausea, no vomiting, + tiredness and fatigue   Head:  No head injury, facial trauma   Eyes:  No acute visual changes, exudate, trauma   Ears:  No acute hearing loss, earaches   Nose: No nasal discharge, epistaxis   Neck: No new hoarseness, voice change, or new masses   Lungs:   No hemoptysis, pleurisy, + cough, + SOB   Heart:  No chest pressure with exertion, no palpitations,    Abdomen:   No new masses, no bright red blood per rectum   Extremities: + difficulty ambulating due to SOB, no new lesions   Skin: No new changes in skin color, no rashes or lesions   Neurologic: No new motor or sensory changes       PHYSICAL EXAM:  /85   Pulse 78   Temp 98.4 °F (36.9 °C)   Resp 18   Ht 6' 7\" (2.007 m)   Wt 260 lb (117.9 kg)   SpO2 90%   BMI 29.29 kg/m²   No intake/output data recorded. PHYSICAL  EXAMINATION (done remotely via the ER room window and standing in the patient's doorway to avoid COVID-19 exposure and conserve PPE) . ..  Captured in coordination with the floor nurse:     BHUMI:  Awake, alert, oriented x 3, patient appears tired and fatigued   Head/Eyes:  Normocephalic, atraumatic, EOMI and PERRLA bilaterally   Respiratory:   Bilateral decreased air entry in both lung fields, scattered bilateral rhonchi, coarse b/l breath sounds (As per floor nurse)   Cardiovascular:  Regular rate and rhythm, S1+S2+0 (As per floor nurse)   Abdomen:   Non-distended   Extremities: Moves all, decreased range of motion, no edema   Neurologic: Awake, alert, oriented x 3, cranial nerves II-XII intact   Psychiatric: Normal mood, non-suicidal           LABORATORY DATA:    CBC:  Recent Labs     11/22/20  1347   WBC 8.2   HGB 16.5   HCT 49.0        BMP:  Recent Labs     11/22/20  1347 11/22/20  1435   *  --    K 4.0 3.9   CL 93*  --    CO2 29  --    BUN 19  --    CREATININE 1.1  --    CALCIUM 8.2*  --      Recent Labs     11/22/20  1347   AST 27   ALT 30   BILITOT 0.5   ALKPHOS 56     Coag Panel: No results for input(s): INR, PROTIME, APTT in the last 72 hours. Cardiac Enzymes: No results for input(s): Jadene Moh in the last 72 hours. ABGs:  Lab Results   Component Value Date    PHART 7.480 11/22/2020    PO2ART 51.0 11/22/2020    XLH6KQM 34.0 11/22/2020     Urinalysis:No results found for: Trisha Jason, BACTERIA, RBCUA, BLOODU, SPECGRAV, GLUCOSEU  A1C: No results for input(s): LABA1C in the last 72 hours. ABG:  Recent Labs     11/22/20  1435   PHART 7.480*   [id number] 34.0*   PO2ART 51.0*   IUH4MIY 25.3   BEART 2.3*   HGBAE 16.9   Q5OLYBUF 87.2*   CARBOXHGBART 2.2       EKG:   Please see chart      IMAGING:  Xr Chest Portable    Result Date: 11/22/2020  Bilateral pneumonia, suspicious for Covid infection. Signed by Dr Eva Wolff on 11/22/2020 3:13 PM        Assessment and Plan:    Principal Problem:    Pneumonia due to COVID-19 virus  Active Problems:    Dyspnea due to COVID-19    Acute respiratory failure with hypoxia (HCC)    Dyslipidemia    GERD (gastroesophageal reflux disease)  Resolved Problems:    * No resolved hospital problems.  *         Admit patient to inpatient COVID-19 unit under full inpatient status  Droplet plus precautions and contact precautions ordered  Patient started on oxygen via nasal cannula to keep O2 sats more than 94%  Patient started on IV/p.o. dexamethasone 6 mg p.o. daily  Lovenox 30/40 units subcu twice daily ordered as per protocol  Patient started on adjuvant meds in the form of vitamin C, vitamin D, zinc and melatonin  Patient started on albuterol and Symbicort inhalers  Patient qualifies to be started on IV remdesivir as per protocol secondary to his worsening with 19 pneumonia  Will consider ID consult for evaluation and further treatment recommendations regarding COVID-19 treatment  Case management consult given for DC planning    Repeat labs in a.m. Electrolyte replacement as per protocol. Patient will be monitored very closely on the floor. Further recommendations as per the hospital course. Patient  is on DVT prophylaxis  Current medications reviewed  Lab work reviewed  Radiology/Chest x-ray films reviewed  Discussed with the nurse and addressed all questions/concerns  Discussed with Patient and/or Family at the bedside in detail . .. they verbalize understanding and agree with the management plan. Attestation:  A minimum of two midnights of inpatient hospital care is anticipated to be required based on the patient's clinical condition which requires intensive medical therapy. At this time the patient is not clinically optimized for safe discharge. Rahat Bhakta MD  6:33 PM 11/22/2020      DISCLAIMER: This note was created with electronic voice recognition which does have occasional errors. If you have any questions regarding the content within the note please do not hesitate to contact me. .. Thanks.

## 2020-11-23 NOTE — ED PROVIDER NOTES
[facility]  eMERGENCY dEPARTMENT eNCOUnter      Pt Name: Twanda Canavan  MRN: [id number]  [place] 1965  Date of evaluation: 11/22/2020  Provider: Judi Cordoba MD    CHIEF COMPLAINT       Chief Complaint   Patient presents with    Shortness of Breath     covid pos, sob increased this AM         HISTORY OF PRESENT ILLNESS   (Location/Symptom, Timing/Onset,Context/Setting, Quality, Duration, Modifying Factors, Severity)  Note limiting factors. Twanda Canavan is a 60 y.o. male who presents to the emergency department for evaluation regarding increasing shortness of breath. Patient states that the symptoms began today and have been a moderate severity. There have been no relieving factors and they seem to be getting progressively worse. He tells me that he is been having ongoing difficulty with fatigue and cough for about the past 1 week. He was diagnosed positive with COVID-19 infection on 11/16. States he has had some nausea but no real significant episodes of vomiting. Mostly he just had a lot of fatigue, body aches and lack of energy. Reports that he does not have a prior history of chronic lung disease, is not maintained on any home oxygen therapy. He was actually seen here in the ED a couple of days ago on 11/18 and underwent a chest radiograph at that time. In review of his records it appears he was maintaining his oxygen saturation and was discharged home at that time. HPI    NursingNotes were reviewed. REVIEW OF SYSTEMS    (2-9 systems for level 4, 10 or more for level 5)     Review of Systems   Constitutional: Negative for chills and fever. Respiratory: Positive for cough and shortness of breath. Negative for wheezing. Cardiovascular: Negative for chest pain. Gastrointestinal: Negative for abdominal pain, nausea and vomiting. Neurological: Negative for dizziness and syncope. All other systems reviewed and are negative.            PAST MEDICALHISTORY     Past Medical History:   Diagnosis Date    GERD (gastroesophageal reflux disease)     Hyperlipidemia     Hypertension          SURGICAL HISTORY       Past Surgical History:   Procedure Laterality Date    HERNIA REPAIR           CURRENT MEDICATIONS     Previous Medications    ALBUTEROL SULFATE HFA (VENTOLIN HFA) 108 (90 BASE) MCG/ACT INHALER    Inhale 2 puffs into the lungs 4 times daily as needed for Shortness of Breath    ATORVASTATIN (LIPITOR) 10 MG TABLET    Take 10 mg by mouth daily    AZITHROMYCIN (ZITHROMAX) 250 MG TABLET    Take 250 mg by mouth daily    CARVEDILOL (COREG) 25 MG TABLET    Take 25 mg by mouth 2 times daily (with meals)    ESOMEPRAZOLE MAGNESIUM (NEXIUM) 20 MG PACK    Take 20 mg by mouth daily    METHYLPREDNISOLONE (MEDROL DOSEPACK) 4 MG TABLET    Take 4 mg by mouth See Admin Instructions Take by mouth. ALLERGIES     Patient has no known allergies. FAMILY HISTORY     History reviewed. No pertinent family history.        SOCIAL HISTORY       Social History     Socioeconomic History    Marital status:      Spouse name: None    Number of children: None    Years of education: None    Highest education level: None   Occupational History    None   Social Needs    Financial resource strain: None    Food insecurity     Worry: None     Inability: None    Transportation needs     Medical: None     Non-medical: None   Tobacco Use    Smoking status: Never Smoker    Smokeless tobacco: Never Used   Substance and Sexual Activity    Alcohol use: Never     Frequency: Never    Drug use: Never    Sexual activity: None   Lifestyle    Physical activity     Days per week: None     Minutes per session: None    Stress: None   Relationships    Social connections     Talks on phone: None     Gets together: None     Attends Oriental orthodox service: None     Active member of club or organization: None     Attends meetings of clubs or organizations: None     Relationship status: None    Intimate partner violence     Fear of current or ex partner: None     Emotionally abused: None     Physically abused: None     Forced sexual activity: None   Other Topics Concern    None   Social History Narrative    None       SCREENINGS             PHYSICAL EXAM    (up to 7 for level 4, 8 or more for level 5)     ED Triage Vitals [11/22/20 1336]   BP Temp Temp src Pulse Resp SpO2 Height Weight   135/85 98.4 °F (36.9 °C) -- 85 18 (!) 86 % 6' 7\" (2.007 m) 260 lb (117.9 kg)       Physical Exam  Vitals signs and nursing note reviewed. HENT:      Head: Atraumatic. Mouth/Throat:      Mouth: Mucous membranes are not dry. Pharynx: No posterior oropharyngeal erythema. Eyes:      General: No scleral icterus. Pupils: Pupils are equal, round, and reactive to light. Neck:      Trachea: No tracheal deviation. Cardiovascular:      Rate and Rhythm: Normal rate and regular rhythm. Pulses: Normal pulses. Heart sounds: Normal heart sounds. No murmur. Pulmonary:      Effort: Pulmonary effort is normal. No respiratory distress. Breath sounds: No stridor. Rhonchi present. Abdominal:      General: There is no distension. Palpations: Abdomen is soft. Tenderness: There is no abdominal tenderness. There is no guarding. Musculoskeletal:      Right lower leg: No edema. Left lower leg: No edema. Skin:     Capillary Refill: Capillary refill takes less than 2 seconds. Coloration: Skin is not pale. Findings: No rash. Neurological:      Mental Status: He is alert and oriented to person, place, and time. Psychiatric:         Mood and Affect: Mood normal.         Behavior: Behavior is cooperative. DIAGNOSTIC RESULTS     EKG: All EKG's areinterpreted by the Emergency Department Physician who either signs or Co-signs this chart in the absence of a cardiologist.    1350: Normal sinus rhythm at 78 no acute ST elevation is identified.     RADIOLOGY:  Non-plain film images such as CT, Ultrasound and MRI are read by the radiologist. Plain radiographic images are visualized and preliminarily interpreted bythe emergency physician with the below findings:        XR CHEST PORTABLE   Final Result   Bilateral pneumonia, suspicious for Covid infection. Signed by Dr Coleen Whitfield on 11/22/2020 3:13 PM              LABS:  Labs Reviewed   BLOOD GAS, ARTERIAL - Abnormal; Notable for the following components:       Result Value    pH, Arterial 7.480 (*)     pCO2, Arterial 34.0 (*)     pO2, Arterial 51.0 (*)     Base Excess, Arterial 2.3 (*)     O2 Sat, Arterial 87.2 (*)     All other components within normal limits    Narrative:     CALL  [facility]  [facility] tel. ,  darien stauffer rn, 11/22/2020 14:37, by [address] - Abnormal; Notable for the following components:    Sodium 132 (*)     Chloride 93 (*)     Calcium 8.2 (*)     All other components within normal limits   CBC WITH AUTO DIFFERENTIAL - Abnormal; Notable for the following components:    Neutrophils % 76.6 (*)     Lymphocytes % 14.0 (*)     All other components within normal limits   D-DIMER, QUANTITATIVE - Abnormal; Notable for the following components:    D-Dimer, Quant 0.57 (*)     All other components within normal limits   FIBRINOGEN - Abnormal; Notable for the following components:    Fibrinogen 711 (*)     All other components within normal limits   LACTIC ACID, PLASMA   POTASSIUM, WHOLE BLOOD   CBC WITH AUTO DIFFERENTIAL   BASIC METABOLIC PANEL   MAGNESIUM   PHOSPHORUS       All other labs were within normal range or not returned as of this dictation.     EMERGENCY DEPARTMENT COURSE and DIFFERENTIAL DIAGNOSIS/MDM:   Vitals:    Vitals:    11/22/20 1530 11/22/20 1600 11/22/20 1630 11/22/20 1711   BP: 119/80 120/81 124/85    Pulse: 76 76 78 78   Resp: 27 28 27 18   Temp:       SpO2: 90% 92% (!) 89% 90%   Weight:       Height:           MDM    Reassessment    Patient's chest radiograph appears markedly worse from previous performed 3 days prior. His room air oxygen saturation upon arrival was 89%. His PO2 on his ABG performed on room air was 51. Patient has received nasal cannula oxygen here in the ED, currently maintaining oxygen saturation of about 93% on 2 L. I have given him a dose of IV Decadron. CONSULTS:    Case was discussed with Dr. Ann Curtis regarding inpatient admission to the hospitalist service. PROCEDURES:  Unless otherwise noted below, none     Procedures    FINAL IMPRESSION      1. Pneumonia due to COVID-19 virus    2.  Acute hypoxemic respiratory failure [facility])          DISPOSITION/PLAN   DISPOSITION Admitted 11/22/2020 06:25:01 PM      (Please note that portions of this note were completed with a voice recognition program.  Efforts were made to edit thedictations but occasionally words are mis-transcribed.)    Brandy Marina MD (electronically signed)  Attending Emergency Physician          Brandy Marina MD  11/22/20 2051

## 2020-11-23 NOTE — ACP (ADVANCE CARE PLANNING)
Advance Care Planning     Advance Care Planning Activator (Inpatient)  Conversation Note      Date of ACP Conversation: 11/23/2020    Conversation Conducted with: Patient with Decision Making Capacity (Over the phone)  Pt said -he has no completed Living Will or Advanced Directives. He discussed his Care Preferences    ACP Activator: [id number] Decision Maker:     Current Designated Health Care Decision Maker:   Primary Decision MakerSana Pires Spouse - [phone]      Care Preferences    Ventilation: \"If you were in your present state of health and suddenly became very ill and were unable to breathe on your own, would you desire the use of ventilator (breathing machine)?: Yes    \"If your health worsens and it becomes clear that your chance of recovery is unlikely, would you  desire the use of ventilator (breathing machine)?: \"I would like to ask my wife about that. \"      Resuscitation  \"In the event your heart stopped as a result of an underlying serious health condition, would you want attempts to be made to restart your heart (answer \"yes\" for attempt to resuscitate) or would you prefer a natural death (answer \"no\" for do not attempt to resuscitate)? \" Yes    Length of ACP Conversation in minutes:  30 minutes    Conversation Outcomes:  [x] ACP discussion completed    Follow-up plan:    [x] Advised patient update if needed with changes in condition, patient preferences or care setting      Electronically signed by MARII Werner [place] on 11/23/2020 at 11:47 AM

## 2020-11-24 LAB
ALBUMIN SERPL-MCNC: 3.1 G/DL (ref 3.5–5.2)
ALP BLD-CCNC: 51 U/L (ref 40–130)
ALT SERPL-CCNC: 33 U/L (ref 5–41)
ANION GAP SERPL CALCULATED.3IONS-SCNC: 10 MMOL/L (ref 7–19)
AST SERPL-CCNC: 28 U/L (ref 5–40)
BILIRUB SERPL-MCNC: 0.3 MG/DL (ref 0.2–1.2)
BUN BLDV-MCNC: 24 MG/DL (ref 6–20)
CALCIUM SERPL-MCNC: 8 MG/DL (ref 8.6–10)
CHLORIDE BLD-SCNC: 99 MMOL/L (ref 98–111)
CO2: 24 MMOL/L (ref 22–29)
CREAT SERPL-MCNC: 1 MG/DL (ref 0.5–1.2)
D DIMER: 0.42 UG/ML FEU (ref 0–0.48)
FIBRINOGEN: 634 MG/DL (ref 238–505)
GFR AFRICAN AMERICAN: >59
GFR NON-AFRICAN AMERICAN: >60
GLUCOSE BLD-MCNC: 165 MG/DL (ref 74–109)
POTASSIUM SERPL-SCNC: 4.5 MMOL/L (ref 3.5–5)
SODIUM BLD-SCNC: 133 MMOL/L (ref 136–145)
TOTAL PROTEIN: 5.9 G/DL (ref 6.6–8.7)

## 2020-11-24 PROCEDURE — 6370000000 HC RX 637 (ALT 250 FOR IP): Performed by: HOSPITALIST

## 2020-11-24 PROCEDURE — 85384 FIBRINOGEN ACTIVITY: CPT

## 2020-11-24 PROCEDURE — 6360000002 HC RX W HCPCS: Performed by: HOSPITALIST

## 2020-11-24 PROCEDURE — 2500000003 HC RX 250 WO HCPCS: Performed by: HOSPITALIST

## 2020-11-24 PROCEDURE — 80053 COMPREHEN METABOLIC PANEL: CPT

## 2020-11-24 PROCEDURE — 2700000000 HC OXYGEN THERAPY PER DAY

## 2020-11-24 PROCEDURE — 2580000003 HC RX 258: Performed by: HOSPITALIST

## 2020-11-24 PROCEDURE — 1210000000 HC MED SURG R&B

## 2020-11-24 PROCEDURE — 2060000000 HC ICU INTERMEDIATE R&B

## 2020-11-24 PROCEDURE — 85379 FIBRIN DEGRADATION QUANT: CPT

## 2020-11-24 RX ADMIN — Medication 15 MG: at 23:03

## 2020-11-24 RX ADMIN — CARVEDILOL 25 MG: 12.5 TABLET, FILM COATED ORAL at 17:44

## 2020-11-24 RX ADMIN — PANTOPRAZOLE SODIUM 40 MG: 40 TABLET, DELAYED RELEASE ORAL at 05:27

## 2020-11-24 RX ADMIN — BUDESONIDE AND FORMOTEROL FUMARATE DIHYDRATE 2 PUFF: 160; 4.5 AEROSOL RESPIRATORY (INHALATION) at 09:03

## 2020-11-24 RX ADMIN — CARVEDILOL 25 MG: 12.5 TABLET, FILM COATED ORAL at 09:01

## 2020-11-24 RX ADMIN — OXYCODONE HYDROCHLORIDE AND ACETAMINOPHEN 500 MG: 500 TABLET ORAL at 23:05

## 2020-11-24 RX ADMIN — SODIUM CHLORIDE, PRESERVATIVE FREE 10 ML: 5 INJECTION INTRAVENOUS at 23:04

## 2020-11-24 RX ADMIN — OXYCODONE HYDROCHLORIDE AND ACETAMINOPHEN 500 MG: 500 TABLET ORAL at 15:29

## 2020-11-24 RX ADMIN — FAMOTIDINE 20 MG: 20 TABLET, FILM COATED ORAL at 09:02

## 2020-11-24 RX ADMIN — ATORVASTATIN CALCIUM 10 MG: 10 TABLET, FILM COATED ORAL at 09:02

## 2020-11-24 RX ADMIN — Medication 6000 UNITS: at 09:01

## 2020-11-24 RX ADMIN — ENOXAPARIN SODIUM 30 MG: 30 INJECTION SUBCUTANEOUS at 23:04

## 2020-11-24 RX ADMIN — BUDESONIDE AND FORMOTEROL FUMARATE DIHYDRATE 2 PUFF: 160; 4.5 AEROSOL RESPIRATORY (INHALATION) at 23:03

## 2020-11-24 RX ADMIN — OXYCODONE HYDROCHLORIDE AND ACETAMINOPHEN 500 MG: 500 TABLET ORAL at 09:02

## 2020-11-24 RX ADMIN — Medication 50 MG: at 09:02

## 2020-11-24 RX ADMIN — REMDESIVIR 100 MG: 100 INJECTION, POWDER, LYOPHILIZED, FOR SOLUTION INTRAVENOUS at 23:18

## 2020-11-24 RX ADMIN — SODIUM CHLORIDE, POTASSIUM CHLORIDE, SODIUM LACTATE AND CALCIUM CHLORIDE: 600; 310; 30; 20 INJECTION, SOLUTION INTRAVENOUS at 15:29

## 2020-11-24 RX ADMIN — FAMOTIDINE 20 MG: 20 TABLET, FILM COATED ORAL at 23:03

## 2020-11-24 RX ADMIN — DEXAMETHASONE SODIUM PHOSPHATE 6 MG: 4 INJECTION, SOLUTION INTRAMUSCULAR; INTRAVENOUS at 17:45

## 2020-11-24 RX ADMIN — SODIUM CHLORIDE, PRESERVATIVE FREE 10 ML: 5 INJECTION INTRAVENOUS at 09:01

## 2020-11-24 RX ADMIN — ENOXAPARIN SODIUM 30 MG: 30 INJECTION SUBCUTANEOUS at 09:01

## 2020-11-24 NOTE — PROGRESS NOTES
[facility] [address]        DEPARTMENT OF HOSPITALIST MEDICINE        PROGRESS  NOTE:    NOTE: Portions of this note have been copied forward, however, changed to reflect the most current clinical status of this patient. Patient:  Jefferson Pat  YOB: 1965  Date of Service: 11/23/2020  MRN: [id number]   Acct: [de-identified]   Primary Care Physician: Tami Eagle MD  Advance Directive: Full Code  Admit Date: 11/22/2020       Hospital Day: 1      CHIEF COMPLAINT:  Chief Complaint   Patient presents with    Shortness of Breath     covid pos, sob increased this AM       SUBJECTIVE:    Patient is feeling a little better after receiving remdesivir. His O2 sats are in 90s on 3 L of oxygen via nasal cannula. [address]  COURSE:    11/23/2020  Patient is feeling better today after receiving oxygen, dexamethasone and remdesivir. He is on day 2 of remdesivir. His O2 sats are in 90s.    11/22/2020  HISTORY OF PRESENT ILLNESS:  Jefferson Pat is an 60 y.o. male. He is a very pleasant gentleman who was diagnosed with COVID-19 pneumonia in our ER 4 days ago when he presented with symptoms for about 6 days. He was stable and not requiring any oxygen hence he was sent home with instruction to self quarantine. He came back to the ER for evaluation with complaints of worsening shortness of breath since this morning. He was satting 86% on room air. He was given oxygen via nasal cannula and his O2 sats improved to 90s. Chest x-ray was done which showed worsening of the pulmonary findings as compared to the x-ray 4 days ago. Given his worsening symptoms and advanced pulmonary changes, he is being admitted for medical management, IV steroids, IV remdesivir, adjuvant medications and close management and hydration in the COVID-19 unit.       REVIEW  OF  SYSTEMS:    Constitutional:  + low grade fevers, chills, + nausea, no vomiting,    Lungs:   No hemoptysis, pleurisy, + SOB, + melatonin  5 mg Oral Nightly    vitamin C  500 mg Oral TID    pantoprazole  40 mg Oral QAM AC    [START ON 11/24/2020] remdesivir IVPB  100 mg Intravenous Q24H        PRN:  albuterol sulfate HFA, 2 puff, Q4H PRN  sodium chloride flush, 10 mL, PRN  potassium chloride, 40 mEq, PRN    Or  potassium alternative oral replacement, 40 mEq, PRN    Or  potassium chloride, 10 mEq, PRN  magnesium sulfate, 1 g, PRN  acetaminophen, 650 mg, Q6H PRN    Or  acetaminophen, 650 mg, Q6H PRN  polyethylene glycol, 17 g, Daily PRN  promethazine, 12.5 mg, Q6H PRN    Or  ondansetron, 4 mg, Q6H PRN  sodium chloride, 30 mL, PRN        Infusions:   lactated ringers         Laboratory Data:  Recent Labs     11/22/20  1347 11/23/20  0604   WBC 8.2 6.8   HGB 16.5 15.6    139     Recent Labs     11/22/20  1347 11/22/20  1435 11/23/20  0604   *  --  132*   K 4.0 3.9 4.5   CL 93*  --  95*   CO2 29  --  25   BUN 19  --  22*   CREATININE 1.1  --  1.0   GLUCOSE 109  --  146*     Recent Labs     11/22/20  1347   AST 27   ALT 30   BILITOT 0.5   ALKPHOS 56     Troponin T: No results for input(s): TROPONINI in the last 72 hours. Pro-BNP: No results for input(s): BNP in the last 72 hours. INR: No results for input(s): INR in the last 72 hours. UA:  Recent Labs     11/22/20  1902   COLORU DARK YELLOW*   PHUR 6.0   WBCUA 3   RBCUA 3   BACTERIA NEGATIVE*   CLARITYU Clear   SPECGRAV 1.031   LEUKOCYTESUR TRACE*   UROBILINOGEN 1.0   BILIRUBINUR Negative   BLOODU Negative   GLUCOSEU Negative     A1C: No results for input(s): LABA1C in the last 72 hours. ABG:  Recent Labs     11/22/20  1435   PHART 7.480*   CGF5NMG 34.0*   PO2ART 51.0*   ZFE5YWV 25.3   BEART 2.3*   HGBAE 16.9   E2GIHLCA 87.2*   CARBOXHGBART 2.2         Imaging:    Xr Chest Portable    Result Date: 11/22/2020  Bilateral pneumonia, suspicious for Covid infection.  Signed by Dr Stephen Tam on 11/22/2020 3:13 PM       ASSESSMENT & PLAN:    Principal Problem:    Pneumonia due to COVID-19 virus  Active Problems:    Dyspnea due to COVID-19    Acute respiratory failure with hypoxia (HCC)    Dyslipidemia    GERD (gastroesophageal reflux disease)  Resolved Problems:    * No resolved hospital problems. *        Continue with current medications  Continue with the IV remdesivir day #2 of 5  Continue with IV dexamethasone  Continue with the Lovenox as per COVID-19 anticoagulation protocol   Continue with adjuvant medications vitamin C, zinc and melatonin  Vitamin D level discontinued as the vitamin D level is 51  Continue with IV hydration  Monitor electrolytes closely      Repeat labs in a.m. Electrolyte replacement as per protocol. Patient will be monitored very closely on the floor. Further recommendations as per the hospital course. Discharge Plan: DC planning home with home health care after 5 days of IV remdesivir if the patient is clinically and symptomatically improved    Patient  is on DVT prophylaxis  Current medications reviewed  Lab work reviewed  Radiology/Chest x-ray films reviewed  Treatment recommendations from suspecialities reviewed, appreciated and agreed with  Discussed with the nurse and addressed all questions/concerns  Discussed with Patient and/or Family at the bedside in detail . .. they understand and agree with the management plan. Sary Eastman MD  11/23/2020 9:09 PM      DISCLAIMER: This note was created with electronic voice recognition which does have occasional errors. If you have any questions regarding the content within the note please do not hesitate to contact me. .. Thanks.

## 2020-11-25 ENCOUNTER — TELEPHONE (OUTPATIENT)
Dept: FAMILY MEDICINE CLINIC | Facility: CLINIC | Age: 55
End: 2020-11-25

## 2020-11-25 LAB
ALBUMIN SERPL-MCNC: 2.9 G/DL (ref 3.5–5.2)
ALP BLD-CCNC: 50 U/L (ref 40–130)
ALT SERPL-CCNC: 41 U/L (ref 5–41)
ANION GAP SERPL CALCULATED.3IONS-SCNC: 10 MMOL/L (ref 7–19)
AST SERPL-CCNC: 27 U/L (ref 5–40)
BILIRUB SERPL-MCNC: 0.3 MG/DL (ref 0.2–1.2)
BUN BLDV-MCNC: 21 MG/DL (ref 6–20)
CALCIUM SERPL-MCNC: 8 MG/DL (ref 8.6–10)
CHLORIDE BLD-SCNC: 101 MMOL/L (ref 98–111)
CO2: 24 MMOL/L (ref 22–29)
CREAT SERPL-MCNC: 0.9 MG/DL (ref 0.5–1.2)
D DIMER: 0.37 UG/ML FEU (ref 0–0.48)
FIBRINOGEN: 524 MG/DL (ref 238–505)
GFR AFRICAN AMERICAN: >59
GFR NON-AFRICAN AMERICAN: >60
GLUCOSE BLD-MCNC: 162 MG/DL (ref 74–109)
HCT VFR BLD CALC: 41.6 % (ref 42–52)
HEMOGLOBIN: 14.5 G/DL (ref 14–18)
MCH RBC QN AUTO: 29.8 PG (ref 27–31)
MCHC RBC AUTO-ENTMCNC: 34.9 G/DL (ref 33–37)
MCV RBC AUTO: 85.4 FL (ref 80–94)
PDW BLD-RTO: 12.5 % (ref 11.5–14.5)
PLATELET # BLD: 153 K/UL (ref 130–400)
PMV BLD AUTO: 11.4 FL (ref 9.4–12.4)
POTASSIUM SERPL-SCNC: 4.2 MMOL/L (ref 3.5–5)
RBC # BLD: 4.87 M/UL (ref 4.7–6.1)
SODIUM BLD-SCNC: 135 MMOL/L (ref 136–145)
TOTAL PROTEIN: 5.7 G/DL (ref 6.6–8.7)
WBC # BLD: 7.6 K/UL (ref 4.8–10.8)

## 2020-11-25 PROCEDURE — 6370000000 HC RX 637 (ALT 250 FOR IP): Performed by: HOSPITALIST

## 2020-11-25 PROCEDURE — 85384 FIBRINOGEN ACTIVITY: CPT

## 2020-11-25 PROCEDURE — 6360000002 HC RX W HCPCS: Performed by: HOSPITALIST

## 2020-11-25 PROCEDURE — 94761 N-INVAS EAR/PLS OXIMETRY MLT: CPT

## 2020-11-25 PROCEDURE — 2700000000 HC OXYGEN THERAPY PER DAY

## 2020-11-25 PROCEDURE — 2580000003 HC RX 258: Performed by: HOSPITALIST

## 2020-11-25 PROCEDURE — 2500000003 HC RX 250 WO HCPCS: Performed by: HOSPITALIST

## 2020-11-25 PROCEDURE — 1210000000 HC MED SURG R&B

## 2020-11-25 PROCEDURE — 85379 FIBRIN DEGRADATION QUANT: CPT

## 2020-11-25 PROCEDURE — 80053 COMPREHEN METABOLIC PANEL: CPT

## 2020-11-25 PROCEDURE — 2060000000 HC ICU INTERMEDIATE R&B

## 2020-11-25 PROCEDURE — 85027 COMPLETE CBC AUTOMATED: CPT

## 2020-11-25 RX ORDER — BENZONATATE 100 MG/1
100 CAPSULE ORAL 3 TIMES DAILY PRN
Status: DISCONTINUED | OUTPATIENT
Start: 2020-11-25 | End: 2020-11-28 | Stop reason: HOSPADM

## 2020-11-25 RX ORDER — ALBUTEROL SULFATE 90 UG/1
2 AEROSOL, METERED RESPIRATORY (INHALATION) EVERY 4 HOURS PRN
Status: DISCONTINUED | OUTPATIENT
Start: 2020-11-25 | End: 2020-11-25

## 2020-11-25 RX ORDER — ALBUTEROL SULFATE 90 UG/1
2 AEROSOL, METERED RESPIRATORY (INHALATION) EVERY 4 HOURS PRN
Status: DISCONTINUED | OUTPATIENT
Start: 2020-11-25 | End: 2020-11-28 | Stop reason: HOSPADM

## 2020-11-25 RX ORDER — ALBUTEROL SULFATE 90 UG/1
2 AEROSOL, METERED RESPIRATORY (INHALATION)
Status: DISCONTINUED | OUTPATIENT
Start: 2020-11-25 | End: 2020-11-28 | Stop reason: HOSPADM

## 2020-11-25 RX ORDER — BUDESONIDE AND FORMOTEROL FUMARATE DIHYDRATE 160; 4.5 UG/1; UG/1
2 AEROSOL RESPIRATORY (INHALATION) 4 TIMES DAILY
Status: DISCONTINUED | OUTPATIENT
Start: 2020-11-25 | End: 2020-11-28 | Stop reason: HOSPADM

## 2020-11-25 RX ADMIN — BUDESONIDE AND FORMOTEROL FUMARATE DIHYDRATE 2 PUFF: 160; 4.5 AEROSOL RESPIRATORY (INHALATION) at 13:46

## 2020-11-25 RX ADMIN — CARVEDILOL 25 MG: 12.5 TABLET, FILM COATED ORAL at 08:59

## 2020-11-25 RX ADMIN — OXYCODONE HYDROCHLORIDE AND ACETAMINOPHEN 500 MG: 500 TABLET ORAL at 13:46

## 2020-11-25 RX ADMIN — BUDESONIDE AND FORMOTEROL FUMARATE DIHYDRATE 2 PUFF: 160; 4.5 AEROSOL RESPIRATORY (INHALATION) at 08:58

## 2020-11-25 RX ADMIN — CARVEDILOL 25 MG: 12.5 TABLET, FILM COATED ORAL at 17:03

## 2020-11-25 RX ADMIN — ALBUTEROL SULFATE 2 PUFF: 90 AEROSOL, METERED RESPIRATORY (INHALATION) at 14:30

## 2020-11-25 RX ADMIN — DEXAMETHASONE SODIUM PHOSPHATE 6 MG: 4 INJECTION, SOLUTION INTRAMUSCULAR; INTRAVENOUS at 17:03

## 2020-11-25 RX ADMIN — ENOXAPARIN SODIUM 120 MG: 120 INJECTION SUBCUTANEOUS at 13:47

## 2020-11-25 RX ADMIN — ALBUTEROL SULFATE 2 PUFF: 90 AEROSOL, METERED RESPIRATORY (INHALATION) at 17:04

## 2020-11-25 RX ADMIN — FAMOTIDINE 20 MG: 20 TABLET, FILM COATED ORAL at 08:59

## 2020-11-25 RX ADMIN — BENZONATATE 100 MG: 100 CAPSULE ORAL at 21:34

## 2020-11-25 RX ADMIN — OXYCODONE HYDROCHLORIDE AND ACETAMINOPHEN 500 MG: 500 TABLET ORAL at 21:34

## 2020-11-25 RX ADMIN — ALBUTEROL SULFATE 2 PUFF: 90 AEROSOL, METERED RESPIRATORY (INHALATION) at 15:51

## 2020-11-25 RX ADMIN — ENOXAPARIN SODIUM 30 MG: 30 INJECTION SUBCUTANEOUS at 08:58

## 2020-11-25 RX ADMIN — HYDROCODONE BITARTRATE AND HOMATROPINE METHYLBROMIDE 1 TABLET: 5; 1.5 TABLET ORAL at 13:45

## 2020-11-25 RX ADMIN — ENOXAPARIN SODIUM 120 MG: 120 INJECTION SUBCUTANEOUS at 21:34

## 2020-11-25 RX ADMIN — ALBUTEROL SULFATE 2 PUFF: 90 AEROSOL, METERED RESPIRATORY (INHALATION) at 18:39

## 2020-11-25 RX ADMIN — PANTOPRAZOLE SODIUM 40 MG: 40 TABLET, DELAYED RELEASE ORAL at 09:00

## 2020-11-25 RX ADMIN — OXYCODONE HYDROCHLORIDE AND ACETAMINOPHEN 500 MG: 500 TABLET ORAL at 08:59

## 2020-11-25 RX ADMIN — ALBUTEROL SULFATE 2 PUFF: 90 AEROSOL, METERED RESPIRATORY (INHALATION) at 21:00

## 2020-11-25 RX ADMIN — BENZONATATE 100 MG: 100 CAPSULE ORAL at 04:08

## 2020-11-25 RX ADMIN — Medication 50 MG: at 08:59

## 2020-11-25 RX ADMIN — Medication 6000 UNITS: at 08:59

## 2020-11-25 RX ADMIN — ALBUTEROL SULFATE 2 PUFF: 90 AEROSOL, METERED RESPIRATORY (INHALATION) at 22:00

## 2020-11-25 RX ADMIN — HYDROCODONE BITARTRATE AND HOMATROPINE METHYLBROMIDE 1 TABLET: 5; 1.5 TABLET ORAL at 19:42

## 2020-11-25 RX ADMIN — ALBUTEROL SULFATE 2 PUFF: 90 AEROSOL, METERED RESPIRATORY (INHALATION) at 13:46

## 2020-11-25 RX ADMIN — FAMOTIDINE 20 MG: 20 TABLET, FILM COATED ORAL at 21:35

## 2020-11-25 RX ADMIN — ALBUTEROL SULFATE 2 PUFF: 90 AEROSOL, METERED RESPIRATORY (INHALATION) at 16:22

## 2020-11-25 RX ADMIN — ATORVASTATIN CALCIUM 10 MG: 10 TABLET, FILM COATED ORAL at 08:59

## 2020-11-25 RX ADMIN — Medication 15 MG: at 21:34

## 2020-11-25 RX ADMIN — SODIUM CHLORIDE, PRESERVATIVE FREE 10 ML: 5 INJECTION INTRAVENOUS at 21:36

## 2020-11-25 RX ADMIN — BUDESONIDE AND FORMOTEROL FUMARATE DIHYDRATE 2 PUFF: 160; 4.5 AEROSOL RESPIRATORY (INHALATION) at 21:35

## 2020-11-25 RX ADMIN — ALBUTEROL SULFATE 2 PUFF: 90 AEROSOL, METERED RESPIRATORY (INHALATION) at 20:00

## 2020-11-25 RX ADMIN — REMDESIVIR 100 MG: 100 INJECTION, POWDER, LYOPHILIZED, FOR SOLUTION INTRAVENOUS at 23:58

## 2020-11-25 RX ADMIN — ALBUTEROL SULFATE 2 PUFF: 90 AEROSOL, METERED RESPIRATORY (INHALATION) at 19:00

## 2020-11-25 RX ADMIN — ALBUTEROL SULFATE 2 PUFF: 90 AEROSOL, METERED RESPIRATORY (INHALATION) at 10:38

## 2020-11-25 NOTE — TELEPHONE ENCOUNTER
Wife informed and she stated that she does not think he will be going home anytime soon his o2 sat is still in the 80's but will ask for neb and sol before they do dc him

## 2020-11-25 NOTE — PROGRESS NOTES
[facility], [address] [place] [address]        DEPARTMENT OF HOSPITALIST MEDICINE        PROGRESS  NOTE:    NOTE: Portions of this note have been copied forward, however, changed to reflect the most current clinical status of this patient. Patient:  Raffi Cota  YOB: 1965  Date of Service: 11/24/2020  MRN: [id number]   Acct: [de-identified]   Primary Care Physician: Scout Skinner MD  Advance Directive: Full Code  Admit Date: 11/22/2020       Hospital Day: 2      CHIEF COMPLAINT:  Chief Complaint   Patient presents with    Shortness of Breath     covid pos, sob increased this AM       SUBJECTIVE:    Patient is feeling better on remdesivir. His O2 sats are stable on nasal cannula    [address]  COURSE:    11/24/2020  Today's remdesivir day 3 for him. He is feeling better. O2 sats are in 90s on nasal cannula. 11/23/2020  Patient is feeling better today after receiving oxygen, dexamethasone and remdesivir. He is on day 2 of remdesivir. His O2 sats are in 90s.    11/22/2020  HISTORY OF PRESENT ILLNESS:  Raffi Cota is an 60 y.o. male. He is a very pleasant gentleman who was diagnosed with COVID-19 pneumonia in our ER 4 days ago when he presented with symptoms for about 6 days. He was stable and not requiring any oxygen hence he was sent home with instruction to self quarantine. He came back to the ER for evaluation with complaints of worsening shortness of breath since this morning. He was satting 86% on room air. He was given oxygen via nasal cannula and his O2 sats improved to 90s. Chest x-ray was done which showed worsening of the pulmonary findings as compared to the x-ray 4 days ago. Given his worsening symptoms and advanced pulmonary changes, he is being admitted for medical management, IV steroids, IV remdesivir, adjuvant medications and close management and hydration in the COVID-19 unit.       REVIEW  OF  SYSTEMS:    Constitutional:  + low grade fevers, chills, + nausea, no vomiting,    Lungs:   No hemoptysis, pleurisy, + SOB, + cough   Heart:  No chest pressure with exertion, palpitations,    Abdomen:   No new masses, no bright red blood per rectum   Extremities: + difficulty ambulating due to weakness, no new lesions   Neurologic: No new motor or sensory changes       PAST MEDICAL HISTORY:  Past Medical History:   Diagnosis Date    GERD (gastroesophageal reflux disease)     Hyperlipidemia     Hypertension          PAST SURGICAL HISTORY:  Past Surgical History:   Procedure Laterality Date    COLONOSCOPY      HERNIA REPAIR          FAMILY HISTORY:  History reviewed. No pertinent family history. OBJECTIVE:  /69   Pulse 56   Temp 98.4 °F (36.9 °C) (Temporal)   Resp 20   Ht 6' 7\" (2.007 m)   Wt 254 lb 9.6 oz (115.5 kg)   SpO2 90%   BMI 28.68 kg/m²   I/O this shift:  In: 240 [P.O.:240]  Out: 400 [Urine:400]      PHYSICAL  EXAMINATION (done remotely turning in doorstep to avoid COVID-19 exposure and to conserve PPE) . ..  Captured in coordination with the floor nurse:     BHUMI:  Awake, alert, oriented x 3, patient appears tired and fatigued   Head/Eyes:  Normocephalic, atraumatic, EOMI and PERRLA bilaterally   Respiratory:   Bilateral decreased air entry in both lung fields, mild bilateral rhonchi, coarse breath sounds (As per floor nurse)   Cardiovascular:  Regular rate and rhythm, S1+S2+0 (As per floor nurse)   Abdomen:   Non-distended   Extremities: Moves all, decreased range of motion, no edema   Neurologic: Awake, alert, oriented x 3, cranial nerves II-XII intact   Psychiatric: Normal mood, non-suicidal         CURRENT MEDICATIONS:  Scheduled:   zinc sulfate  50 mg Oral Daily    melatonin  15 mg Oral Nightly    dexamethasone  6 mg Intravenous Q24H    carvedilol  25 mg Oral BID WC    atorvastatin  10 mg Oral Daily    sodium chloride flush  10 mL Intravenous 2 times per day    famotidine  20 mg Oral BID    enoxaparin  30 mg Subcutaneous BID    Vitamin D  6,000 Units Oral Daily    budesonide-formoterol  2 puff Inhalation BID    vitamin C  500 mg Oral TID    pantoprazole  40 mg Oral QAM AC    remdesivir IVPB  100 mg Intravenous Q24H        PRN:  albuterol sulfate HFA, 2 puff, Q4H PRN  sodium chloride flush, 10 mL, PRN  potassium chloride, 40 mEq, PRN    Or  potassium alternative oral replacement, 40 mEq, PRN    Or  potassium chloride, 10 mEq, PRN  magnesium sulfate, 1 g, PRN  acetaminophen, 650 mg, Q6H PRN    Or  acetaminophen, 650 mg, Q6H PRN  polyethylene glycol, 17 g, Daily PRN  promethazine, 12.5 mg, Q6H PRN    Or  ondansetron, 4 mg, Q6H PRN  sodium chloride, 30 mL, PRN        Infusions:   lactated ringers 100 mL/hr at 11/24/20 1529       Laboratory Data:  Recent Labs     11/22/20  1347 11/23/20  0604   WBC 8.2 6.8   HGB 16.5 15.6    139     Recent Labs     11/22/20  1347 11/22/20  1435 11/23/20  0604 11/24/20  0145   *  --  132* 133*   K 4.0 3.9 4.5 4.5   CL 93*  --  95* 99   CO2 29  --  25 24   BUN 19  --  22* 24*   CREATININE 1.1  --  1.0 1.0   GLUCOSE 109  --  146* 165*     Recent Labs     11/22/20  1347 11/24/20  0145   AST 27 28   ALT 30 33   BILITOT 0.5 0.3   ALKPHOS 56 51     Troponin T: No results for input(s): TROPONINI in the last 72 hours. Pro-BNP: No results for input(s): BNP in the last 72 hours. INR: No results for input(s): INR in the last 72 hours. UA:  Recent Labs     11/22/20  1902   COLORU DARK YELLOW*   PHUR 6.0   WBCUA 3   RBCUA 3   BACTERIA NEGATIVE*   CLARITYU Clear   SPECGRAV 1.031   LEUKOCYTESUR TRACE*   UROBILINOGEN 1.0   BILIRUBINUR Negative   BLOODU Negative   GLUCOSEU Negative     A1C: No results for input(s): LABA1C in the last 72 hours.   ABG:  Recent Labs     11/22/20  1435   PHART 7.480*   [id number] 34.0*   PO2ART 51.0*   [id number] 25.3   BEART 2.3*   HGBAE 16.9   [id number] 87.2*   CARBOXHGBART 2.2         Imaging:    Xr Chest Portable    Result Date: 11/22/2020  Bilateral pneumonia, suspicious for Covid

## 2020-11-25 NOTE — PROGRESS NOTES
Patient was found with an O2 saturation of 86 on 6 liters nasal cannula. Patient was placed on a venti mask at 8 liters and is now satting 91%.   Electronically signed by Jamie Malagon RN on 11/25/2020 at 2:03 PM

## 2020-11-25 NOTE — TELEPHONE ENCOUNTER
PT'S WIFE CALLED REGARDING PT'S RECENT HOSPITAL ADMITTANCE FOR COVID-19    PT IS AT [facility] AND BEING TOLD HE COULD BE DISCHARGED AS EARLY AS TOMORROW, BUT PT IS NOT FEELING ANY BETTER AND STILL HAVING A LOT OF TROUBLE BREATHING, PT'S WIFE DOES NOT FEEL PT IS READY TO COME HOME    PT'S WIFE IS WANTING TO KNOW IF PT NEEDS BREATHING TREATMENTS OR ANYTHING RELATED, WANTS A CALLBACK FROM DR. LOWE'S TEAM TO DISCUSS HOW TO PROCEED    [phone]

## 2020-11-26 LAB
ALBUMIN SERPL-MCNC: 3.3 G/DL (ref 3.5–5.2)
ALP BLD-CCNC: 54 U/L (ref 40–130)
ALT SERPL-CCNC: 60 U/L (ref 5–41)
ANION GAP SERPL CALCULATED.3IONS-SCNC: 13 MMOL/L (ref 7–19)
AST SERPL-CCNC: 35 U/L (ref 5–40)
BILIRUB SERPL-MCNC: 0.6 MG/DL (ref 0.2–1.2)
BUN BLDV-MCNC: 18 MG/DL (ref 6–20)
CALCIUM SERPL-MCNC: 8.1 MG/DL (ref 8.6–10)
CHLORIDE BLD-SCNC: 99 MMOL/L (ref 98–111)
CO2: 23 MMOL/L (ref 22–29)
CREAT SERPL-MCNC: 0.8 MG/DL (ref 0.5–1.2)
D DIMER: 1.21 UG/ML FEU (ref 0–0.48)
FIBRINOGEN: 594 MG/DL (ref 238–505)
GFR AFRICAN AMERICAN: >59
GFR NON-AFRICAN AMERICAN: >60
GLUCOSE BLD-MCNC: 147 MG/DL (ref 74–109)
HCT VFR BLD CALC: 43.7 % (ref 42–52)
HEMOGLOBIN: 15.1 G/DL (ref 14–18)
MCH RBC QN AUTO: 29.3 PG (ref 27–31)
MCHC RBC AUTO-ENTMCNC: 34.6 G/DL (ref 33–37)
MCV RBC AUTO: 84.9 FL (ref 80–94)
PDW BLD-RTO: 12.7 % (ref 11.5–14.5)
PLATELET # BLD: 164 K/UL (ref 130–400)
PMV BLD AUTO: 11.7 FL (ref 9.4–12.4)
POTASSIUM SERPL-SCNC: 4.4 MMOL/L (ref 3.5–5)
RBC # BLD: 5.15 M/UL (ref 4.7–6.1)
SODIUM BLD-SCNC: 135 MMOL/L (ref 136–145)
TOTAL PROTEIN: 5.5 G/DL (ref 6.6–8.7)
WBC # BLD: 9.8 K/UL (ref 4.8–10.8)

## 2020-11-26 PROCEDURE — 6360000002 HC RX W HCPCS: Performed by: HOSPITALIST

## 2020-11-26 PROCEDURE — 85379 FIBRIN DEGRADATION QUANT: CPT

## 2020-11-26 PROCEDURE — 6370000000 HC RX 637 (ALT 250 FOR IP): Performed by: HOSPITALIST

## 2020-11-26 PROCEDURE — 80053 COMPREHEN METABOLIC PANEL: CPT

## 2020-11-26 PROCEDURE — 85027 COMPLETE CBC AUTOMATED: CPT

## 2020-11-26 PROCEDURE — 6370000000 HC RX 637 (ALT 250 FOR IP): Performed by: INTERNAL MEDICINE

## 2020-11-26 PROCEDURE — 2060000000 HC ICU INTERMEDIATE R&B

## 2020-11-26 PROCEDURE — 2580000003 HC RX 258: Performed by: HOSPITALIST

## 2020-11-26 PROCEDURE — 1210000000 HC MED SURG R&B

## 2020-11-26 PROCEDURE — 94761 N-INVAS EAR/PLS OXIMETRY MLT: CPT

## 2020-11-26 PROCEDURE — 2700000000 HC OXYGEN THERAPY PER DAY

## 2020-11-26 PROCEDURE — 85384 FIBRINOGEN ACTIVITY: CPT

## 2020-11-26 RX ORDER — LORAZEPAM 0.5 MG/1
0.5 TABLET ORAL EVERY 6 HOURS PRN
Status: DISCONTINUED | OUTPATIENT
Start: 2020-11-26 | End: 2020-11-28 | Stop reason: HOSPADM

## 2020-11-26 RX ADMIN — ALBUTEROL SULFATE 2 PUFF: 90 AEROSOL, METERED RESPIRATORY (INHALATION) at 11:00

## 2020-11-26 RX ADMIN — ATORVASTATIN CALCIUM 10 MG: 10 TABLET, FILM COATED ORAL at 10:31

## 2020-11-26 RX ADMIN — BUDESONIDE AND FORMOTEROL FUMARATE DIHYDRATE 2 PUFF: 160; 4.5 AEROSOL RESPIRATORY (INHALATION) at 14:54

## 2020-11-26 RX ADMIN — OXYCODONE HYDROCHLORIDE AND ACETAMINOPHEN 500 MG: 500 TABLET ORAL at 19:54

## 2020-11-26 RX ADMIN — ALBUTEROL SULFATE 2 PUFF: 90 AEROSOL, METERED RESPIRATORY (INHALATION) at 14:00

## 2020-11-26 RX ADMIN — SODIUM CHLORIDE, PRESERVATIVE FREE 10 ML: 5 INJECTION INTRAVENOUS at 10:31

## 2020-11-26 RX ADMIN — FAMOTIDINE 20 MG: 20 TABLET, FILM COATED ORAL at 19:54

## 2020-11-26 RX ADMIN — LORAZEPAM 0.5 MG: 0.5 TABLET ORAL at 21:02

## 2020-11-26 RX ADMIN — CARVEDILOL 25 MG: 12.5 TABLET, FILM COATED ORAL at 10:32

## 2020-11-26 RX ADMIN — Medication 50 MG: at 10:31

## 2020-11-26 RX ADMIN — ALBUTEROL SULFATE 2 PUFF: 90 AEROSOL, METERED RESPIRATORY (INHALATION) at 17:00

## 2020-11-26 RX ADMIN — BUDESONIDE AND FORMOTEROL FUMARATE DIHYDRATE 2 PUFF: 160; 4.5 AEROSOL RESPIRATORY (INHALATION) at 10:33

## 2020-11-26 RX ADMIN — DEXAMETHASONE SODIUM PHOSPHATE 6 MG: 4 INJECTION, SOLUTION INTRAMUSCULAR; INTRAVENOUS at 18:42

## 2020-11-26 RX ADMIN — ALBUTEROL SULFATE 2 PUFF: 90 AEROSOL, METERED RESPIRATORY (INHALATION) at 08:00

## 2020-11-26 RX ADMIN — Medication 6000 UNITS: at 10:32

## 2020-11-26 RX ADMIN — Medication 15 MG: at 19:54

## 2020-11-26 RX ADMIN — ENOXAPARIN SODIUM 120 MG: 120 INJECTION SUBCUTANEOUS at 19:54

## 2020-11-26 RX ADMIN — ALBUTEROL SULFATE 2 PUFF: 90 AEROSOL, METERED RESPIRATORY (INHALATION) at 13:00

## 2020-11-26 RX ADMIN — ALBUTEROL SULFATE 2 PUFF: 90 AEROSOL, METERED RESPIRATORY (INHALATION) at 07:00

## 2020-11-26 RX ADMIN — SODIUM CHLORIDE, PRESERVATIVE FREE 10 ML: 5 INJECTION INTRAVENOUS at 19:53

## 2020-11-26 RX ADMIN — ALBUTEROL SULFATE 2 PUFF: 90 AEROSOL, METERED RESPIRATORY (INHALATION) at 19:56

## 2020-11-26 RX ADMIN — LORAZEPAM 0.5 MG: 0.5 TABLET ORAL at 14:57

## 2020-11-26 RX ADMIN — ALBUTEROL SULFATE 2 PUFF: 90 AEROSOL, METERED RESPIRATORY (INHALATION) at 21:03

## 2020-11-26 RX ADMIN — BUDESONIDE AND FORMOTEROL FUMARATE DIHYDRATE 2 PUFF: 160; 4.5 AEROSOL RESPIRATORY (INHALATION) at 19:54

## 2020-11-26 RX ADMIN — ENOXAPARIN SODIUM 120 MG: 120 INJECTION SUBCUTANEOUS at 10:31

## 2020-11-26 RX ADMIN — BUDESONIDE AND FORMOTEROL FUMARATE DIHYDRATE 2 PUFF: 160; 4.5 AEROSOL RESPIRATORY (INHALATION) at 18:44

## 2020-11-26 RX ADMIN — OXYCODONE HYDROCHLORIDE AND ACETAMINOPHEN 500 MG: 500 TABLET ORAL at 10:32

## 2020-11-26 RX ADMIN — HYDROCODONE BITARTRATE AND HOMATROPINE METHYLBROMIDE 1 TABLET: 5; 1.5 TABLET ORAL at 01:12

## 2020-11-26 RX ADMIN — CARVEDILOL 25 MG: 12.5 TABLET, FILM COATED ORAL at 18:42

## 2020-11-26 RX ADMIN — ALBUTEROL SULFATE 2 PUFF: 90 AEROSOL, METERED RESPIRATORY (INHALATION) at 09:00

## 2020-11-26 RX ADMIN — ALBUTEROL SULFATE 2 PUFF: 90 AEROSOL, METERED RESPIRATORY (INHALATION) at 19:00

## 2020-11-26 RX ADMIN — ALBUTEROL SULFATE 2 PUFF: 90 AEROSOL, METERED RESPIRATORY (INHALATION) at 10:00

## 2020-11-26 RX ADMIN — ALBUTEROL SULFATE 2 PUFF: 90 AEROSOL, METERED RESPIRATORY (INHALATION) at 04:49

## 2020-11-26 RX ADMIN — ALBUTEROL SULFATE 2 PUFF: 90 AEROSOL, METERED RESPIRATORY (INHALATION) at 14:53

## 2020-11-26 RX ADMIN — PANTOPRAZOLE SODIUM 40 MG: 40 TABLET, DELAYED RELEASE ORAL at 10:32

## 2020-11-26 RX ADMIN — OXYCODONE HYDROCHLORIDE AND ACETAMINOPHEN 500 MG: 500 TABLET ORAL at 14:54

## 2020-11-26 RX ADMIN — FAMOTIDINE 20 MG: 20 TABLET, FILM COATED ORAL at 10:31

## 2020-11-26 RX ADMIN — ALBUTEROL SULFATE 2 PUFF: 90 AEROSOL, METERED RESPIRATORY (INHALATION) at 16:00

## 2020-11-26 RX ADMIN — ALBUTEROL SULFATE 2 PUFF: 90 AEROSOL, METERED RESPIRATORY (INHALATION) at 12:00

## 2020-11-26 RX ADMIN — BENZONATATE 100 MG: 100 CAPSULE ORAL at 04:50

## 2020-11-26 RX ADMIN — ALBUTEROL SULFATE 2 PUFF: 90 AEROSOL, METERED RESPIRATORY (INHALATION) at 18:00

## 2020-11-26 NOTE — CARE COORDINATION
Attempted to call pt to discuss d/c plans and quarentine but no answer on room phone or cell phone.  Will call back later

## 2020-11-26 NOTE — PROGRESS NOTES
[place], [place], [address]        DEPARTMENT OF HOSPITALIST MEDICINE        PROGRESS  NOTE:    NOTE: Portions of this note have been copied forward, however, changed to reflect the most current clinical status of this patient. Patient:  Isabelle Chambers  YOB: 1965  Date of Service: 11/26/2020  MRN: [id number]   Acct: [de-identified]   Primary Care Physician: Yasmin Ríos MD  Advance Directive: Full Code  Admit Date: 11/22/2020       Hospital Day: 4      CHIEF COMPLAINT:  Chief Complaint   Patient presents with    Shortness of Breath     covid pos, sob increased this AM       SUBJECTIVE:    Feels anxious since tired of being sick for a couple of weeks. He wants to know other options about his treatment for COVID-19! [address]  COURSE:    11/26/2020  Patient is finishing up day 5 of remdesivir. His O2 needs have increased slightly, but he has been stable and able to carry out complete conversation. He is asking about other treatment options for COVID-19. I recommended convalescent plasma and gave him a pamphlet to read. I also spoke with Dr. Rupa Lea, the ID specialist who came and spoke with the patient as well. They both believe given his prolonged symptoms of about 2 weeks, covalescent plasma is not of much benefit. Patient admits that he feels very anxious hence he is started on low-dose PO Ativan. I will order home oxygen evaluation in am as well as DC planning! 11/25/2020  Patient is on day 4 of remdesivir. His O2 comments increased throughout the day. He was increased from 6 L nasal cannula to Ventimask hour at 8 L. He also increased him to full anticoagulation, increased albuterol every hour and Symbicort every 6 hours. 11/24/2020  Today's remdesivir day 3 for him. He is feeling better. O2 sats are in 90s on nasal cannula. 11/23/2020  Patient is feeling better today after receiving oxygen, dexamethasone and remdesivir.   He is on day 2 of remdesivir. His O2 sats are in 90s.    11/22/2020  HISTORY OF PRESENT ILLNESS:  Reinaldo Powell is an 60 y.o. male. He is a very pleasant gentleman who was diagnosed with COVID-19 pneumonia in our ER 4 days ago when he presented with symptoms for about 6 days. He was stable and not requiring any oxygen hence he was sent home with instruction to self quarantine. He came back to the ER for evaluation with complaints of worsening shortness of breath since this morning. He was satting 86% on room air. He was given oxygen via nasal cannula and his O2 sats improved to 90s. Chest x-ray was done which showed worsening of the pulmonary findings as compared to the x-ray 4 days ago. Given his worsening symptoms and advanced pulmonary changes, he is being admitted for medical management, IV steroids, IV remdesivir, adjuvant medications and close management and hydration in the COVID-19 unit. REVIEW  OF  SYSTEMS:    Constitutional:  + low grade fevers, chills, + nausea, no vomiting,    Lungs:   No hemoptysis, pleurisy, + SOB, + cough   Heart:  No chest pressure with exertion, palpitations,    Abdomen:   No new masses, no bright red blood per rectum   Extremities: + difficulty ambulating due to weakness, no new lesions   Neurologic: No new motor or sensory changes       PAST MEDICAL HISTORY:  Past Medical History:   Diagnosis Date    GERD (gastroesophageal reflux disease)     Hyperlipidemia     Hypertension          PAST SURGICAL HISTORY:  Past Surgical History:   Procedure Laterality Date    COLONOSCOPY      HERNIA REPAIR          FAMILY HISTORY:  History reviewed. No pertinent family history. OBJECTIVE:  /66   Pulse 68   Temp 98.3 °F (36.8 °C)   Resp 18   Ht 6' 7\" (2.007 m)   Wt 250 lb 6 oz (113.6 kg)   SpO2 91%   BMI 28.21 kg/m²   No intake/output data recorded. PHYSICAL  EXAMINATION (done remotely turning in doorstep to avoid COVID-19 exposure and to conserve PPE) . ..  Captured in coordination with the floor nurse:     BHUMI:  Awake, alert, oriented x 3, patient appears tired and fatigued   Head/Eyes:  Normocephalic, atraumatic, EOMI and PERRLA bilaterally   Respiratory:   Bilateral decreased air entry in both lung fields, mild bilateral rhonchi, coarse breath sounds (As per floor nurse)   Cardiovascular:  Regular rate and rhythm, S1+S2+0 (As per floor nurse)   Abdomen:   Non-distended   Extremities: Moves all, decreased range of motion, no edema   Neurologic: Awake, alert, oriented x 3, cranial nerves II-XII intact   Psychiatric: Normal mood, non-suicidal         CURRENT MEDICATIONS:  Scheduled:   budesonide-formoterol  2 puff Inhalation 4x Daily    albuterol sulfate HFA  2 puff Inhalation Q1H While awake    enoxaparin  1 mg/kg Subcutaneous BID    zinc sulfate  50 mg Oral Daily    melatonin  15 mg Oral Nightly    dexamethasone  6 mg Intravenous Q24H    carvedilol  25 mg Oral BID WC    atorvastatin  10 mg Oral Daily    sodium chloride flush  10 mL Intravenous 2 times per day    famotidine  20 mg Oral BID    Vitamin D  6,000 Units Oral Daily    vitamin C  500 mg Oral TID    pantoprazole  40 mg Oral QAM AC    remdesivir IVPB  100 mg Intravenous Q24H        PRN:  LORazepam, 0.5 mg, Q6H PRN  benzonatate, 100 mg, TID PRN  HYDROcodone-homatropine, 1 tablet, Q6H PRN  albuterol sulfate HFA, 2 puff, Q4H PRN  sodium chloride flush, 10 mL, PRN  potassium chloride, 40 mEq, PRN    Or  potassium alternative oral replacement, 40 mEq, PRN    Or  potassium chloride, 10 mEq, PRN  magnesium sulfate, 1 g, PRN  acetaminophen, 650 mg, Q6H PRN    Or  acetaminophen, 650 mg, Q6H PRN  polyethylene glycol, 17 g, Daily PRN  promethazine, 12.5 mg, Q6H PRN    Or  ondansetron, 4 mg, Q6H PRN  sodium chloride, 30 mL, PRN        Infusions:      Laboratory Data:  Recent Labs     11/25/20 0420 11/26/20  0500   WBC 7.6 9.8   HGB 14.5 15.1    164     Recent Labs     11/24/20  0145 11/25/20 0420 11/26/20  0500 * 135* 135*   K 4.5 4.2 4.4   CL 99 101 99   CO2 24 24 23   BUN 24* 21* 18   CREATININE 1.0 0.9 0.8   GLUCOSE 165* 162* 147*     Recent Labs     11/24/20  0145 11/25/20  0420 11/26/20  0500   AST 28 27 35   ALT 33 41 60*   BILITOT 0.3 0.3 0.6   ALKPHOS 51 50 54     Troponin T: No results for input(s): TROPONINI in the last 72 hours. Pro-BNP: No results for input(s): BNP in the last 72 hours. INR: No results for input(s): INR in the last 72 hours. UA:  No results for input(s): NITRITE, COLORU, PHUR, LABCAST, WBCUA, RBCUA, MUCUS, TRICHOMONAS, YEAST, BACTERIA, CLARITYU, SPECGRAV, LEUKOCYTESUR, UROBILINOGEN, BILIRUBINUR, BLOODU, GLUCOSEU, AMORPHOUS in the last 72 hours. Invalid input(s): [place]  A1C: No results for input(s): LABA1C in the last 72 hours. ABG:  No results for input(s): PHART, JHJ3GEB, PO2ART, JBX0DWS, BEART, HGBAE, S9ZMNCCQ, CARBOXHGBART in the last 72 hours. Imaging:    Xr Chest Portable    Result Date: 11/22/2020  Bilateral pneumonia, suspicious for Covid infection. Signed by Dr Stephen Tam on 11/22/2020 3:13 PM       ASSESSMENT & PLAN:    Principal Problem:    Pneumonia due to COVID-19 virus  Active Problems:    Dyspnea due to COVID-19    Acute respiratory failure with hypoxia (HCC)    Dyslipidemia    GERD (gastroesophageal reflux disease)  Resolved Problems:    * No resolved hospital problems.  *        Continue with current medications  Continue with the IV remdesivir day #5 of 5  Continue with IV dexamethasone  Increase Lovenox to full anticoagulation protocol 1 mg/kg twice daily  Albuterol inhaler increased to 2 puffs q. hourly  Symbicort inhaler increased to every 6 hours  Continue with adjuvant medications vitamin C, zinc and melatonin  I spoke with the patient in detail and gave him a pamphlet to read about covalescent plasma  ID evaluated the patient and spoke with him in detail and they both believe, given his prolonged symptoms of COVID-19, covalescent plasma is not of much benefit  Patient started on Ativan 0.5 mg p.o. every 6 hours as needed for anxiety  Follow-up on case management for DC planning in a.m. Home O2 evaluation ordered in the morning      Repeat labs in a.m. Electrolyte replacement as per protocol. Patient will be monitored very closely on the floor. Further recommendations as per the hospital course. Discharge Plan: DC planning home with home health care likely tomorrow if patient remains stable    Patient  is on DVT prophylaxis  Current medications reviewed  Lab work reviewed  Radiology/Chest x-ray films reviewed  Treatment recommendations from suspecialities reviewed, appreciated and agreed with  Discussed with the nurse and addressed all questions/concerns  Discussed with Patient and/or Family at the bedside in detail . .. they understand and agree with the management plan. Corby Mcgarry MD  11/26/2020 1:49 PM      DISCLAIMER: This note was created with electronic voice recognition which does have occasional errors. If you have any questions regarding the content within the note please do not hesitate to contact me. .. Thanks.

## 2020-11-26 NOTE — CONSULTS
INFECTIOUS DISEASES CONSULT NOTE    Patient:  Nae Doherty 60 y.o. male  ROOM # [unfilled]  YOB: 1965  MRN: [id number]  CSN:  [id number]  Admit date: 11/22/2020   Admitting Physician: Nisha Brizuela MD  Primary Care Physician: Danyel Pacheco MD  REFERRING PROVIDER: No ref. provider found    Reason for Consultation: COVID-19 infection    History of Present Illness/Chief Complaint: Very pleasant 49-year-old man. He dates onset of symptoms to 2 weeks ago today. He developed fever. He also developed fatigue. He subsequently developed some shortness of breath. He has had some progressive shortness of breath. He had tested positive for Covid Monday of last week which was about 3 or 4 days into illness. He has been in the hospital.  He has received remdesivir. He is receiving dexamethasone. He wondered about convalescent plasma. Reviewed with him that it is not yet FDA approved. Reviewed that it is available under emergency use authorization. Explained there is no proven benefit. Explained it is my opinion that it would probably be most helpful if given early on. He is 2 weeks into treatment. Offered to administer plasma if he wanted to proceed with treatment. He felt as if there was no real definite likelihood of benefit that he would prefer not to receive the treatment. I would agree. He has not having productive cough. He currently is without nausea or diarrhea. He indicates due to his prolonged illness and bedrest he is starting to feel a little bit anxious. He wondered about something for anxiety. Overall he feels he is starting to be able to take deeper breaths.     Current Scheduled Medications:    budesonide-formoterol  2 puff Inhalation 4x Daily    albuterol sulfate HFA  2 puff Inhalation Q1H While awake    enoxaparin  1 mg/kg Subcutaneous BID    zinc sulfate  50 mg Oral Daily    melatonin  15 mg Oral Nightly    dexamethasone  6 mg Intravenous Q24H    carvedilol  25 mg Oral BID WC    atorvastatin  10 mg Oral Daily    sodium chloride flush  10 mL Intravenous 2 times per day    famotidine  20 mg Oral BID    Vitamin D  6,000 Units Oral Daily    vitamin C  500 mg Oral TID    pantoprazole  40 mg Oral QAM AC    remdesivir IVPB  100 mg Intravenous Q24H     Current PRN Medications:  benzonatate, HYDROcodone-homatropine, albuterol sulfate HFA, sodium chloride flush, potassium chloride **OR** potassium alternative oral replacement **OR** potassium chloride, magnesium sulfate, acetaminophen **OR** acetaminophen, polyethylene glycol, promethazine **OR** ondansetron, sodium chloride    Allergies:  No Known Allergies    Past Medical History: Hypertension. Hyperlipidemia. Gastroesophageal reflux disease. Past Surgical History: Hernia repair    Social History: . No tobacco use. No alcohol use. No illicit drug use. Family History: Noncontributory    Review of Systems: See HPI. No chest pain or chest pressure. Vital Signs:  /70   Pulse 70   Temp 98.2 °F (36.8 °C) (Temporal)   Resp 20   Ht 6' 7\" (2.007 m)   Wt 250 lb 6 oz (113.6 kg)   SpO2 (!) 87%   BMI 28.21 kg/m²  Temp (24hrs), Av.3 °F (36.3 °C), Min:96.3 °F (35.7 °C), Max:98.2 °F (36.8 °C)    Physical Exam:   Vital signs reviewed. General tired appearing  He is weak appearing  He is not coughing  Mild tachypnea  On nasal cannula and facemask oxygen  He seems to have fairly good air movement  No crackles or wheezes  Heart without murmur  Abdomen soft nontender.   No hepatosplenomegaly  Extremities without edema    Lab Results:  CBC:   Recent Labs     20  0420 20  0500   WBC 7.6 9.8   HGB 14.5 15.1   HCT 41.6* 43.7    164     CMP:   Recent Labs     20  0145 20  0420 20  0500   * 135* 135*   K 4.5 4.2 4.4   CL 99 101 99   CO2 24 24 23   BUN 24* 21* 18   CREATININE 1.0 0.9 0.8   CALCIUM 8.0* 8.0* 8.1*   BILITOT 0.3 0.3 0.6   ALKPHOS 51 50 54   ALT 33 41 60*   AST 28

## 2020-11-27 LAB
D DIMER: 0.38 UG/ML FEU (ref 0–0.48)
FIBRINOGEN: 659 MG/DL (ref 238–505)
HCT VFR BLD CALC: 43.5 % (ref 42–52)
HEMOGLOBIN: 14.8 G/DL (ref 14–18)
MCH RBC QN AUTO: 29.2 PG (ref 27–31)
MCHC RBC AUTO-ENTMCNC: 34 G/DL (ref 33–37)
MCV RBC AUTO: 86 FL (ref 80–94)
PDW BLD-RTO: 12.8 % (ref 11.5–14.5)
PLATELET # BLD: 175 K/UL (ref 130–400)
PMV BLD AUTO: 11.5 FL (ref 9.4–12.4)
RBC # BLD: 5.06 M/UL (ref 4.7–6.1)
WBC # BLD: 7.4 K/UL (ref 4.8–10.8)

## 2020-11-27 PROCEDURE — 85384 FIBRINOGEN ACTIVITY: CPT

## 2020-11-27 PROCEDURE — 2500000003 HC RX 250 WO HCPCS: Performed by: HOSPITALIST

## 2020-11-27 PROCEDURE — 6370000000 HC RX 637 (ALT 250 FOR IP): Performed by: HOSPITALIST

## 2020-11-27 PROCEDURE — 85379 FIBRIN DEGRADATION QUANT: CPT

## 2020-11-27 PROCEDURE — 2060000000 HC ICU INTERMEDIATE R&B

## 2020-11-27 PROCEDURE — 94761 N-INVAS EAR/PLS OXIMETRY MLT: CPT

## 2020-11-27 PROCEDURE — 6370000000 HC RX 637 (ALT 250 FOR IP): Performed by: INTERNAL MEDICINE

## 2020-11-27 PROCEDURE — 6360000002 HC RX W HCPCS: Performed by: HOSPITALIST

## 2020-11-27 PROCEDURE — 85027 COMPLETE CBC AUTOMATED: CPT

## 2020-11-27 PROCEDURE — 2700000000 HC OXYGEN THERAPY PER DAY

## 2020-11-27 PROCEDURE — 2580000003 HC RX 258: Performed by: HOSPITALIST

## 2020-11-27 RX ADMIN — SODIUM CHLORIDE, PRESERVATIVE FREE 10 ML: 5 INJECTION INTRAVENOUS at 09:41

## 2020-11-27 RX ADMIN — BUDESONIDE AND FORMOTEROL FUMARATE DIHYDRATE 2 PUFF: 160; 4.5 AEROSOL RESPIRATORY (INHALATION) at 09:42

## 2020-11-27 RX ADMIN — ALBUTEROL SULFATE 2 PUFF: 90 AEROSOL, METERED RESPIRATORY (INHALATION) at 09:00

## 2020-11-27 RX ADMIN — ALBUTEROL SULFATE 2 PUFF: 90 AEROSOL, METERED RESPIRATORY (INHALATION) at 06:19

## 2020-11-27 RX ADMIN — FAMOTIDINE 20 MG: 20 TABLET, FILM COATED ORAL at 20:53

## 2020-11-27 RX ADMIN — FAMOTIDINE 20 MG: 20 TABLET, FILM COATED ORAL at 09:42

## 2020-11-27 RX ADMIN — ALBUTEROL SULFATE 2 PUFF: 90 AEROSOL, METERED RESPIRATORY (INHALATION) at 07:00

## 2020-11-27 RX ADMIN — CARVEDILOL 25 MG: 12.5 TABLET, FILM COATED ORAL at 17:03

## 2020-11-27 RX ADMIN — ALBUTEROL SULFATE 2 PUFF: 90 AEROSOL, METERED RESPIRATORY (INHALATION) at 19:02

## 2020-11-27 RX ADMIN — DEXAMETHASONE SODIUM PHOSPHATE 6 MG: 4 INJECTION, SOLUTION INTRAMUSCULAR; INTRAVENOUS at 15:14

## 2020-11-27 RX ADMIN — ENOXAPARIN SODIUM 120 MG: 120 INJECTION SUBCUTANEOUS at 20:53

## 2020-11-27 RX ADMIN — ALBUTEROL SULFATE 2 PUFF: 90 AEROSOL, METERED RESPIRATORY (INHALATION) at 08:00

## 2020-11-27 RX ADMIN — ATORVASTATIN CALCIUM 10 MG: 10 TABLET, FILM COATED ORAL at 09:42

## 2020-11-27 RX ADMIN — OXYCODONE HYDROCHLORIDE AND ACETAMINOPHEN 500 MG: 500 TABLET ORAL at 09:42

## 2020-11-27 RX ADMIN — ALBUTEROL SULFATE 2 PUFF: 90 AEROSOL, METERED RESPIRATORY (INHALATION) at 20:00

## 2020-11-27 RX ADMIN — OXYCODONE HYDROCHLORIDE AND ACETAMINOPHEN 500 MG: 500 TABLET ORAL at 20:53

## 2020-11-27 RX ADMIN — ENOXAPARIN SODIUM 120 MG: 120 INJECTION SUBCUTANEOUS at 09:41

## 2020-11-27 RX ADMIN — Medication 50 MG: at 09:41

## 2020-11-27 RX ADMIN — ALBUTEROL SULFATE 2 PUFF: 90 AEROSOL, METERED RESPIRATORY (INHALATION) at 11:00

## 2020-11-27 RX ADMIN — ALBUTEROL SULFATE 2 PUFF: 90 AEROSOL, METERED RESPIRATORY (INHALATION) at 17:00

## 2020-11-27 RX ADMIN — OXYCODONE HYDROCHLORIDE AND ACETAMINOPHEN 500 MG: 500 TABLET ORAL at 15:03

## 2020-11-27 RX ADMIN — ALBUTEROL SULFATE 2 PUFF: 90 AEROSOL, METERED RESPIRATORY (INHALATION) at 10:00

## 2020-11-27 RX ADMIN — PANTOPRAZOLE SODIUM 40 MG: 40 TABLET, DELAYED RELEASE ORAL at 06:19

## 2020-11-27 RX ADMIN — BUDESONIDE AND FORMOTEROL FUMARATE DIHYDRATE 2 PUFF: 160; 4.5 AEROSOL RESPIRATORY (INHALATION) at 17:03

## 2020-11-27 RX ADMIN — ALBUTEROL SULFATE 2 PUFF: 90 AEROSOL, METERED RESPIRATORY (INHALATION) at 13:00

## 2020-11-27 RX ADMIN — Medication 6000 UNITS: at 09:42

## 2020-11-27 RX ADMIN — LORAZEPAM 0.5 MG: 0.5 TABLET ORAL at 20:58

## 2020-11-27 RX ADMIN — Medication 15 MG: at 20:54

## 2020-11-27 RX ADMIN — SODIUM CHLORIDE, PRESERVATIVE FREE 10 ML: 5 INJECTION INTRAVENOUS at 20:53

## 2020-11-27 RX ADMIN — LORAZEPAM 0.5 MG: 0.5 TABLET ORAL at 15:14

## 2020-11-27 RX ADMIN — ALBUTEROL SULFATE 2 PUFF: 90 AEROSOL, METERED RESPIRATORY (INHALATION) at 15:00

## 2020-11-27 RX ADMIN — SODIUM CHLORIDE 30 ML: 9 INJECTION, SOLUTION INTRAVENOUS at 00:05

## 2020-11-27 RX ADMIN — BUDESONIDE AND FORMOTEROL FUMARATE DIHYDRATE 2 PUFF: 160; 4.5 AEROSOL RESPIRATORY (INHALATION) at 20:52

## 2020-11-27 RX ADMIN — REMDESIVIR 100 MG: 100 INJECTION, POWDER, LYOPHILIZED, FOR SOLUTION INTRAVENOUS at 00:05

## 2020-11-27 RX ADMIN — ALBUTEROL SULFATE 2 PUFF: 90 AEROSOL, METERED RESPIRATORY (INHALATION) at 16:00

## 2020-11-27 RX ADMIN — CARVEDILOL 25 MG: 12.5 TABLET, FILM COATED ORAL at 09:42

## 2020-11-27 RX ADMIN — ALBUTEROL SULFATE 2 PUFF: 90 AEROSOL, METERED RESPIRATORY (INHALATION) at 14:00

## 2020-11-27 RX ADMIN — BUDESONIDE AND FORMOTEROL FUMARATE DIHYDRATE 2 PUFF: 160; 4.5 AEROSOL RESPIRATORY (INHALATION) at 15:09

## 2020-11-27 RX ADMIN — ALBUTEROL SULFATE 2 PUFF: 90 AEROSOL, METERED RESPIRATORY (INHALATION) at 12:00

## 2020-11-27 RX ADMIN — ALBUTEROL SULFATE 2 PUFF: 90 AEROSOL, METERED RESPIRATORY (INHALATION) at 20:52

## 2020-11-27 RX ADMIN — ALBUTEROL SULFATE 2 PUFF: 90 AEROSOL, METERED RESPIRATORY (INHALATION) at 18:00

## 2020-11-27 NOTE — CARE COORDINATION
Patient returned call and discussed [facility] orders. Patient agreeable and has chosen [facility]. Referral Faxed. [address] [place] [address] [phone]. [phone]. Please notify [facility] when patient discharges and fax DC Summary,  DC med list and any new [facility] orders. The Patient was provided with a choice of provider by phone and agrees with the discharge plan. [x] Yes [] No    Freedom of choice list was provided with basic dialogue that supports the patient's individualized plan of care/goals, treatment preferences and shares the quality data associated with the providers.  [x] Yes [] No  Electronically signed by Santiago Shaikh RN on 11/27/2020 at 2:53 PM

## 2020-11-27 NOTE — PROGRESS NOTES
Infectious Diseases Progress Note    Patient:  Twanda Canavan  YOB: 1965  MRN: [id number]   Admit date: 11/22/2020   Admitting Physician: Brittanie Nath MD  Primary Care Physician: Dave Ward MD    Chief Complaint/Interval History: From an objective standpoint he is about the same. Oxygen requirement stable. He has been largely at bedrest.  He has made some short trips to the bathroom. He was frustrated as he had not received an incentive spirometer as I had talked with him about yesterday. He indicates he had asked 3 or 4 different people subsequent to speaking with me yesterday (including RT) and he did not get a spirometer. I spoke with the charge nurse. She immediately delivered onto his room. He is wanting to do anything he can actively to continue improvement. He is without fever. He indicates the Ativan that he had helped somewhat with anxiety yesterday.     In/Out    Intake/Output Summary (Last 24 hours) at 11/27/2020 1247  Last data filed at 11/27/2020 7897  Gross per 24 hour   Intake 225 ml   Output 900 ml   Net -675 ml     Allergies: No Known Allergies  Current Meds: LORazepam (ATIVAN) tablet 0.5 mg, Q6H PRN  benzonatate (TESSALON) capsule 100 mg, TID PRN  HYDROcodone-homatropine (HYCODAN) 5-1.5 MG per tablet 1 tablet, Q6H PRN  budesonide-formoterol (SYMBICORT) 160-4.5 MCG/ACT inhaler 2 puff, 4x Daily  albuterol sulfate  (90 Base) MCG/ACT inhaler 2 puff, Q4H PRN  albuterol sulfate  (90 Base) MCG/ACT inhaler 2 puff, Q1H While awake  enoxaparin (LOVENOX) injection 120 mg, BID  zinc sulfate (ZINCATE) capsule 50 mg, Daily  melatonin disintegrating tablet 15 mg, Nightly  dexamethasone (DECADRON) injection 6 mg, Q24H  carvedilol (COREG) tablet 25 mg, BID WC  atorvastatin (LIPITOR) tablet 10 mg, Daily  sodium chloride flush 0.9 % injection 10 mL, 2 times per day  sodium chloride flush 0.9 % injection 10 mL, PRN  potassium chloride (KLOR-CON M) extended release tablet 40 mEq, PRN    Or  potassium bicarb-citric acid (EFFER-K) effervescent tablet 40 mEq, PRN    Or  potassium chloride 10 mEq/100 mL IVPB (Peripheral Line), PRN  magnesium sulfate 1 g in dextrose 5% 100 mL IVPB, PRN  acetaminophen (TYLENOL) tablet 650 mg, Q6H PRN    Or  acetaminophen (TYLENOL) suppository 650 mg, Q6H PRN  polyethylene glycol (GLYCOLAX) packet 17 g, Daily PRN  promethazine (PHENERGAN) tablet 12.5 mg, Q6H PRN    Or  ondansetron (ZOFRAN) injection 4 mg, Q6H PRN  famotidine (PEPCID) tablet 20 mg, BID  Vitamin D (CHOLECALCIFEROL) tablet 6,000 Units, Daily  vitamin C (ASCORBIC ACID) tablet 500 mg, TID  pantoprazole (PROTONIX) tablet 40 mg, QAM AC  0.9 % sodium chloride bolus, PRN      Review of Systems no nausea, diarrhea, or neurological symptoms. VitalSigns:  /76   Pulse 63   Temp 96.8 °F (36 °C)   Resp 24   Ht 6' 7\" (2.007 m)   Wt 250 lb 6 oz (113.6 kg)   SpO2 90%   BMI 28.21 kg/m²      Physical Exam   Spoke with him on his room was able to observe him and speak with him. Discussed his bedside exam with nursing. He is a little stronger appearing today overall than yesterday. Minimally tachypneic. He did not appear to be in distress at rest.  He was not coughing during evaluation. He has no lower extremity edema  Discussed bedside exam with nursing. No crackles or wheezes. Lab Results:  CBC:   Recent Labs     11/25/20  0420 11/26/20  0500 11/27/20  0320   WBC 7.6 9.8 7.4   HGB 14.5 15.1 14.8    164 175     BMP:  Recent Labs     11/25/20  0420 11/26/20  0500   * 135*   K 4.2 4.4    99   CO2 24 23   BUN 21* 18   CREATININE 0.9 0.8   GLUCOSE 162* 147*     Fibrinogen 618  D-dimer 0.38      CultureResults: Blood cultures November 22, 2020 no growth    Radiology: None    Additional Studies Reviewed:  None    Impression:  COVID-19 pneumonia-requiring high flow oxygen. Oxygen requirement stable, but he is on significant support.   Feel care is primarily supportive and time at this point. Continue to watch for evidence of secondary bacterial infection. Currently no productive cough, fever, or leukocytosis to suggest developing secondary bacterial infection.     Recommendations:  He has completed remdesivir  Continue dexamethasone  Continue vitamin C, vitamin D, zinc, and atorvastatin  Incentive spirometer  Feel be beneficial for him to get up to chair some and try to begin to continue to increase activity as oxygenation well allow  Continue Ativan as needed for anxiety  Continue to follow    Diane Kelley MD

## 2020-11-27 NOTE — CARE COORDINATION
Home Health referral received. Attempt to reach patient by phone due to COVID restrictions. No answer to room phone but was able to leave message on his cell phone. Will wait for return call to discuss [employer] services/orders.   Electronically signed by Jb Em RN on 11/27/20 at 12:22 PM CST

## 2020-11-28 VITALS
DIASTOLIC BLOOD PRESSURE: 71 MMHG | TEMPERATURE: 97.5 F | SYSTOLIC BLOOD PRESSURE: 122 MMHG | BODY MASS INDEX: 28.97 KG/M2 | HEART RATE: 74 BPM | RESPIRATION RATE: 16 BRPM | OXYGEN SATURATION: 87 % | HEIGHT: 78 IN | WEIGHT: 250.38 LBS

## 2020-11-28 PROBLEM — F41.1 GENERALIZED ANXIETY DISORDER: Status: ACTIVE | Noted: 2020-11-28

## 2020-11-28 LAB
BLOOD CULTURE, ROUTINE: NORMAL
CULTURE, BLOOD 2: NORMAL
D DIMER: 0.5 UG/ML FEU (ref 0–0.48)
FIBRINOGEN: 618 MG/DL (ref 238–505)
HCT VFR BLD CALC: 44.9 % (ref 42–52)
HEMOGLOBIN: 15.5 G/DL (ref 14–18)
MCH RBC QN AUTO: 29.3 PG (ref 27–31)
MCHC RBC AUTO-ENTMCNC: 34.5 G/DL (ref 33–37)
MCV RBC AUTO: 84.9 FL (ref 80–94)
PDW BLD-RTO: 12.6 % (ref 11.5–14.5)
PLATELET # BLD: 236 K/UL (ref 130–400)
PMV BLD AUTO: 11 FL (ref 9.4–12.4)
RBC # BLD: 5.29 M/UL (ref 4.7–6.1)
WBC # BLD: 9.1 K/UL (ref 4.8–10.8)

## 2020-11-28 PROCEDURE — 85379 FIBRIN DEGRADATION QUANT: CPT

## 2020-11-28 PROCEDURE — 2580000003 HC RX 258: Performed by: HOSPITALIST

## 2020-11-28 PROCEDURE — 6370000000 HC RX 637 (ALT 250 FOR IP): Performed by: INTERNAL MEDICINE

## 2020-11-28 PROCEDURE — 6370000000 HC RX 637 (ALT 250 FOR IP): Performed by: HOSPITALIST

## 2020-11-28 PROCEDURE — 85384 FIBRINOGEN ACTIVITY: CPT

## 2020-11-28 PROCEDURE — 6360000002 HC RX W HCPCS: Performed by: HOSPITALIST

## 2020-11-28 PROCEDURE — 85027 COMPLETE CBC AUTOMATED: CPT

## 2020-11-28 PROCEDURE — 2700000000 HC OXYGEN THERAPY PER DAY

## 2020-11-28 RX ORDER — DEXAMETHASONE 6 MG/1
6 TABLET ORAL EVERY 6 HOURS
Qty: 16 TABLET | Refills: 0 | Status: SHIPPED | OUTPATIENT
Start: 2020-11-28 | End: 2020-11-28 | Stop reason: SDUPTHER

## 2020-11-28 RX ORDER — CITALOPRAM 20 MG/1
20 TABLET ORAL DAILY
Qty: 30 TABLET | Refills: 0 | Status: SHIPPED | OUTPATIENT
Start: 2020-11-28 | End: 2020-11-28 | Stop reason: SDUPTHER

## 2020-11-28 RX ORDER — CITALOPRAM 20 MG/1
20 TABLET ORAL DAILY
Qty: 30 TABLET | Refills: 0 | Status: SHIPPED | OUTPATIENT
Start: 2020-11-28

## 2020-11-28 RX ORDER — ALBUTEROL SULFATE 90 UG/1
2 AEROSOL, METERED RESPIRATORY (INHALATION) 4 TIMES DAILY PRN
Qty: 1 INHALER | Refills: 0 | Status: SHIPPED | OUTPATIENT
Start: 2020-11-28

## 2020-11-28 RX ORDER — DEXAMETHASONE 6 MG/1
6 TABLET ORAL EVERY 6 HOURS
Qty: 16 TABLET | Refills: 0 | Status: SHIPPED | OUTPATIENT
Start: 2020-11-28 | End: 2020-12-02

## 2020-11-28 RX ORDER — ALBUTEROL SULFATE 90 UG/1
2 AEROSOL, METERED RESPIRATORY (INHALATION) 4 TIMES DAILY PRN
Qty: 1 INHALER | Refills: 0 | Status: SHIPPED | OUTPATIENT
Start: 2020-11-28 | End: 2020-11-28 | Stop reason: SDUPTHER

## 2020-11-28 RX ADMIN — ATORVASTATIN CALCIUM 10 MG: 10 TABLET, FILM COATED ORAL at 09:33

## 2020-11-28 RX ADMIN — BUDESONIDE AND FORMOTEROL FUMARATE DIHYDRATE 2 PUFF: 160; 4.5 AEROSOL RESPIRATORY (INHALATION) at 13:36

## 2020-11-28 RX ADMIN — LORAZEPAM 0.5 MG: 0.5 TABLET ORAL at 09:33

## 2020-11-28 RX ADMIN — LORAZEPAM 0.5 MG: 0.5 TABLET ORAL at 15:37

## 2020-11-28 RX ADMIN — ALBUTEROL SULFATE 2 PUFF: 90 AEROSOL, METERED RESPIRATORY (INHALATION) at 15:35

## 2020-11-28 RX ADMIN — FAMOTIDINE 20 MG: 20 TABLET, FILM COATED ORAL at 09:33

## 2020-11-28 RX ADMIN — LORAZEPAM 0.5 MG: 0.5 TABLET ORAL at 03:17

## 2020-11-28 RX ADMIN — DEXAMETHASONE SODIUM PHOSPHATE 6 MG: 4 INJECTION, SOLUTION INTRAMUSCULAR; INTRAVENOUS at 15:39

## 2020-11-28 RX ADMIN — ALBUTEROL SULFATE 2 PUFF: 90 AEROSOL, METERED RESPIRATORY (INHALATION) at 09:34

## 2020-11-28 RX ADMIN — ALBUTEROL SULFATE 2 PUFF: 90 AEROSOL, METERED RESPIRATORY (INHALATION) at 06:11

## 2020-11-28 RX ADMIN — ALBUTEROL SULFATE 2 PUFF: 90 AEROSOL, METERED RESPIRATORY (INHALATION) at 17:29

## 2020-11-28 RX ADMIN — SODIUM CHLORIDE, PRESERVATIVE FREE 10 ML: 5 INJECTION INTRAVENOUS at 09:36

## 2020-11-28 RX ADMIN — PANTOPRAZOLE SODIUM 40 MG: 40 TABLET, DELAYED RELEASE ORAL at 06:11

## 2020-11-28 RX ADMIN — Medication 6000 UNITS: at 09:33

## 2020-11-28 RX ADMIN — CARVEDILOL 25 MG: 12.5 TABLET, FILM COATED ORAL at 09:33

## 2020-11-28 RX ADMIN — ALBUTEROL SULFATE 2 PUFF: 90 AEROSOL, METERED RESPIRATORY (INHALATION) at 13:37

## 2020-11-28 RX ADMIN — ALBUTEROL SULFATE 2 PUFF: 90 AEROSOL, METERED RESPIRATORY (INHALATION) at 09:37

## 2020-11-28 RX ADMIN — OXYCODONE HYDROCHLORIDE AND ACETAMINOPHEN 500 MG: 500 TABLET ORAL at 13:36

## 2020-11-28 RX ADMIN — BUDESONIDE AND FORMOTEROL FUMARATE DIHYDRATE 2 PUFF: 160; 4.5 AEROSOL RESPIRATORY (INHALATION) at 17:29

## 2020-11-28 RX ADMIN — Medication 50 MG: at 09:33

## 2020-11-28 RX ADMIN — BUDESONIDE AND FORMOTEROL FUMARATE DIHYDRATE 2 PUFF: 160; 4.5 AEROSOL RESPIRATORY (INHALATION) at 09:34

## 2020-11-28 RX ADMIN — ALBUTEROL SULFATE 2 PUFF: 90 AEROSOL, METERED RESPIRATORY (INHALATION) at 15:38

## 2020-11-28 RX ADMIN — CARVEDILOL 25 MG: 12.5 TABLET, FILM COATED ORAL at 17:28

## 2020-11-28 RX ADMIN — ENOXAPARIN SODIUM 120 MG: 120 INJECTION SUBCUTANEOUS at 09:33

## 2020-11-28 NOTE — CARE COORDINATION
HILLARY provided pts nurse with SNF paper and tay coupon/ 30 day free trial. HILLARY put her number on SNF paper and informed nurse to please tell pt to call her with any referrals he wants sent.

## 2020-11-28 NOTE — PROGRESS NOTES
Patient states he wanted to use incentive spirometer and removed ventimask to do so. Patient's sats were maintained at 87 to 89 percent on 6L NC at rest in bed. Respiratory at bedside, and ok with leaving ventimask off for now. Will monitor.

## 2020-11-28 NOTE — CARE COORDINATION
HILLARY placed call to pts room re: home o2. SW asked pt if he had a preference on where it came from. Pt stated no. SW faxed pts order to [facility] and called to verify it was received.      [address]

## 2020-11-28 NOTE — DISCHARGE SUMMARY
[place], [place], [address]        DEPARTMENT OF HOSPITALIST MEDICINE        DISCHARGE SUMMARY:      PATIENT NAME:  Aniyah Davies  :  1965  MRN:  [id number]    Admission Date:   2020  1:27 PM Attending: Isaias Murphy MD   Discharge Date:   2020              PCP: Hal Porter MD  Length of Stay: 6 days     Chief Complaint on Admission:   Chief Complaint   Patient presents with    Shortness of Breath     covid pos, sob increased this AM       Consultants:     IP CONSULT TO PHARMACY  IP CONSULT TO CASE MANAGEMENT  PALLIATIVE CARE EVAL  IP CONSULT TO INFECTIOUS DISEASES  IP CONSULT TO HOME CARE NEEDS  IP CONSULT TO CASE MANAGEMENT  IP CONSULT TO CASE MANAGEMENT  IP CONSULT TO PULMONOLOGY       Discharge Problem List:   Principal Problem:    Pneumonia due to COVID-19 virus  Active Problems:    Dyspnea due to COVID-19    Acute respiratory failure with hypoxia (HCC)    Dyslipidemia    GERD (gastroesophageal reflux disease)    Generalized anxiety disorder  Resolved Problems:    * No resolved hospital problems. [facility]  COURSE  AND  TREATMENT:    2020  Patient is feeling much better today. He is not as anxious as he was yesterday. His O2 sats are holding well in 90s on 4-6 L of oxygen via nasal cannula. We did a home oxygen evaluation test which he failed and he would be discharged home on 4 L of oxygen via nasal cannula. We will continue with his p.o. dexamethasone until he completes the 10 days course. He is advised to follow-up closely with his PCP and ID as an outpatient, self quarantine himself adequately at home, set up with [address] [place] [address] and take Eliquis for 1 month as directed. I will prescribe him citalopram 20 mg p.o. daily to help with his anxiety. 2020  I had a couple of visits to the patient's room today.   In the first visit, I talked to him regarding going to skilled nursing facility for a week or so until his breathing is improved before he can go home because he is on very high flow oxygen and would desaturate really quickly and come back to the hospital.  He initially agreed, but was upset when the nurse and case management approached him regarding that. I came back and talked to the patient again in the room and re-explained him that it is very unlikely that he can go home on Venti flow/high flow oxygen. I encouraged him to calm down, relax and take steps one at a time. Once he relaxes and his oxygenation improve, we might be able to send him home. He absolutely refuses going to skilled nursing facility and is willing to stay in the hospital for couple of days if that is what is required to bring his oxygenation up. I also ordered a pulmonary evaluation for further treatment recommendations regarding his breathing!     11/26/2020  Patient is finishing up day 5 of remdesivir. His O2 needs have increased slightly, but he has been stable and able to carry out complete conversation. He is asking about other treatment options for COVID-19. I recommended convalescent plasma and gave him a pamphlet to read. I also spoke with Dr. Flor Norwood, the ID specialist who came and spoke with the patient as well. They both believe given his prolonged symptoms of about 2 weeks, covalescent plasma is not of much benefit. Patient admits that he feels very anxious hence he is started on low-dose PO Ativan. I will order home oxygen evaluation in am as well as DC planning!     11/25/2020  Patient is on day 4 of remdesivir. His O2 comments increased throughout the day. He was increased from 6 L nasal cannula to Ventimask hour at 8 L. He also increased him to full anticoagulation, increased albuterol every hour and Symbicort every 6 hours.     11/24/2020  Today's remdesivir day 3 for him. He is feeling better.   O2 sats are in 90s on nasal cannula.     11/23/2020  Patient is feeling better today after receiving oxygen, dexamethasone and remdesivir. He is on day 2 of remdesivir. His O2 sats are in 90s.    11/22/2020  HISTORY OF PRESENT ILLNESS:  Seda Lai is an 60 y. o. male.  He is a very pleasant gentleman who was diagnosed with COVID-19 pneumonia in our ER 4 days ago when he presented with symptoms for about 6 days. Kenzie Limon was stable and not requiring any oxygen hence he was sent home with instruction to self quarantine. He came back to the ER for evaluation with complaints of worsening shortness of breath since this morning.  He was satting 86% on room air.  He was given oxygen via nasal cannula and his O2 sats improved to 90s.  Chest x-ray was done which showed worsening of the pulmonary findings as compared to the x-ray 4 days ago. Ova Minion his worsening symptoms and advanced pulmonary changes, he is being admitted for medical management, IV steroids, IV remdesivir, adjuvant medications and close management and hydration in the COVID-19 unit.       OBJECTIVE:  /71   Pulse 74   Temp 97.5 °F (36.4 °C)   Resp 16   Ht 6' 7\" (2.007 m)   Wt 250 lb 6 oz (113.6 kg)   SpO2 (!) 87%   BMI 28.21 kg/m²       Heart: RRR   Lungs:  Bilateral decreased air entry in both lungs, scattered bilateral rhonchi   Abdomen: Soft, non-tender   Extremities: No edema   Neurologic: Alert and oriented   Skin: Warm and dry          Laboratory Data:  Recent Labs     11/26/20  0500 11/27/20  0320 11/28/20  0325   WBC 9.8 7.4 9.1   HGB 15.1 14.8 15.5    175 236     Recent Labs     11/26/20  0500   *   K 4.4   CL 99   CO2 23   BUN 18   CREATININE 0.8   GLUCOSE 147*     Recent Labs     11/26/20  0500   AST 35   ALT 60*   BILITOT 0.6   ALKPHOS 54     Troponin T: No results for input(s): TROPONINI in the last 72 hours. Pro-BNP: No results for input(s): BNP in the last 72 hours. INR: No results for input(s): INR in the last 72 hours.   UA:No results for input(s): NITRITE, COLORU, PHUR, LABCAST, WBCUA, RBCUA, MUCUS, TRICHOMONAS, YEAST, BACTERIA, CLARITYU, discharge paperwork. Patient was seen at bedside today, and the examination shows improvement since yesterday. Detailed discharge directions delivered to the patient by myself and our nursing staff, who verbalizes understanding and is very happy and satisfied with the plan. Patient has been advised to continue all medications as prescribed and advised, and f/u with PCP within 1 week. Patient is stable from medical standpoint to be discharged. Total time spent during patient evaluation and assessment, discussion with the nurse/family, addressing discharge medications/scripts and coordination of care for safe discharge was in excess of 35 minutes.       Signed Electronically:    Jem Saab MD  3:48 PM 11/28/2020

## 2020-11-28 NOTE — PROGRESS NOTES
[place], [place], [address]        DEPARTMENT OF HOSPITALIST MEDICINE        PROGRESS  NOTE:    NOTE: Portions of this note have been copied forward, however, changed to reflect the most current clinical status of this patient. Patient:  Reinaldo Powell  YOB: 1965  Date of Service: 11/27/2020  MRN: [id number]   Acct: [de-identified]   Primary Care Physician: Leonard Garcia MD  Advance Directive: Full Code  Admit Date: 11/22/2020       Hospital Day: 5      CHIEF COMPLAINT:  Chief Complaint   Patient presents with    Shortness of Breath     covid pos, sob increased this AM       SUBJECTIVE:    Patient feels anxious to go home. He is on Ventimask and desats with minimal effort! Fuad Lopez  COURSE:    11/27/2020  I had a couple of visits to the patient's room today. In the first visit, I talked to him regarding going to skilled nursing facility for a week or so until his breathing is improved before he can go home because he is on very high flow oxygen and would desaturate really quickly and come back to the hospital.  He initially agreed, but was upset when the nurse and case management approached him regarding that. I came back and talked to the patient again in the room and re-explained him that it is very unlikely that he can go home on Venti flow/high flow oxygen. I encouraged him to calm down, relax and take steps one at a time. Once he relaxes and his oxygenation improve, we might be able to send him home. He absolutely refuses going to skilled nursing facility and is willing to stay in the hospital for couple of days if that is what is required to bring his oxygenation up. I also ordered a pulmonary evaluation for further treatment recommendations regarding his breathing!    11/26/2020  Patient is finishing up day 5 of remdesivir. His O2 needs have increased slightly, but he has been stable and able to carry out complete conversation.   He is asking about other treatment options for COVID-19. I recommended convalescent plasma and gave him a pamphlet to read. I also spoke with Dr. Caity Cao, the ID specialist who came and spoke with the patient as well. They both believe given his prolonged symptoms of about 2 weeks, covalescent plasma is not of much benefit. Patient admits that he feels very anxious hence he is started on low-dose PO Ativan. I will order home oxygen evaluation in am as well as DC planning! 11/25/2020  Patient is on day 4 of remdesivir. His O2 comments increased throughout the day. He was increased from 6 L nasal cannula to Ventimask hour at 8 L. He also increased him to full anticoagulation, increased albuterol every hour and Symbicort every 6 hours. 11/24/2020  Today's remdesivir day 3 for him. He is feeling better. O2 sats are in 90s on nasal cannula. 11/23/2020  Patient is feeling better today after receiving oxygen, dexamethasone and remdesivir. He is on day 2 of remdesivir. His O2 sats are in 90s.    11/22/2020  HISTORY OF PRESENT ILLNESS:  Yenni Mcqueen is an 60 y.o. male. He is a very pleasant gentleman who was diagnosed with COVID-19 pneumonia in our ER 4 days ago when he presented with symptoms for about 6 days. He was stable and not requiring any oxygen hence he was sent home with instruction to self quarantine. He came back to the ER for evaluation with complaints of worsening shortness of breath since this morning. He was satting 86% on room air. He was given oxygen via nasal cannula and his O2 sats improved to 90s. Chest x-ray was done which showed worsening of the pulmonary findings as compared to the x-ray 4 days ago. Given his worsening symptoms and advanced pulmonary changes, he is being admitted for medical management, IV steroids, IV remdesivir, adjuvant medications and close management and hydration in the COVID-19 unit.       REVIEW  OF  SYSTEMS:    Constitutional:  + low grade fevers, chills, + nausea, no vomiting,    Lungs:   No hemoptysis, pleurisy, + SOB, + cough   Heart:  No chest pressure with exertion, palpitations,    Abdomen:   No new masses, no bright red blood per rectum   Extremities: + difficulty ambulating due to weakness, no new lesions   Neurologic: No new motor or sensory changes, + anxious patient       PAST MEDICAL HISTORY:  Past Medical History:   Diagnosis Date    GERD (gastroesophageal reflux disease)     Hyperlipidemia     Hypertension          PAST SURGICAL HISTORY:  Past Surgical History:   Procedure Laterality Date    COLONOSCOPY      HERNIA REPAIR          FAMILY HISTORY:  History reviewed. No pertinent family history. OBJECTIVE:  /80   Pulse 60   Temp 97.1 °F (36.2 °C) (Temporal)   Resp 20   Ht 6' 7\" (2.007 m)   Wt 250 lb 6 oz (113.6 kg)   SpO2 91%   BMI 28.21 kg/m²   No intake/output data recorded. PHYSICAL  EXAMINATION (done remotely turning in doorstep to avoid COVID-19 exposure and to conserve PPE) . ..  Captured in coordination with the floor nurse:     BHUMI:  Awake, alert, oriented x 3, patient appears tired and fatigued   Head/Eyes:  Normocephalic, atraumatic, EOMI and PERRLA bilaterally   Respiratory:   Bilateral decreased air entry in both lung fields, mild bilateral rhonchi, coarse breath sounds (As per floor nurse)   Cardiovascular:  Regular rate and rhythm, S1+S2+0 (As per floor nurse)   Abdomen:   Non-distended   Extremities: Moves all, decreased range of motion, no edema   Neurologic: Awake, alert, oriented x 3, cranial nerves II-XII intact   Psychiatric: + anxious affect, non-suicidal         CURRENT MEDICATIONS:  Scheduled:   budesonide-formoterol  2 puff Inhalation 4x Daily    albuterol sulfate HFA  2 puff Inhalation Q1H While awake    enoxaparin  1 mg/kg Subcutaneous BID    zinc sulfate  50 mg Oral Daily    melatonin  15 mg Oral Nightly    dexamethasone  6 mg Intravenous Q24H    carvedilol  25 mg Oral BID WC    atorvastatin  10 mg Oral Daily    sodium chloride flush  10 mL Intravenous 2 times per day    famotidine  20 mg Oral BID    Vitamin D  6,000 Units Oral Daily    vitamin C  500 mg Oral TID    pantoprazole  40 mg Oral QAM AC        PRN:  LORazepam, 0.5 mg, Q6H PRN  benzonatate, 100 mg, TID PRN  HYDROcodone-homatropine, 1 tablet, Q6H PRN  albuterol sulfate HFA, 2 puff, Q4H PRN  sodium chloride flush, 10 mL, PRN  potassium chloride, 40 mEq, PRN    Or  potassium alternative oral replacement, 40 mEq, PRN    Or  potassium chloride, 10 mEq, PRN  magnesium sulfate, 1 g, PRN  acetaminophen, 650 mg, Q6H PRN    Or  acetaminophen, 650 mg, Q6H PRN  polyethylene glycol, 17 g, Daily PRN  promethazine, 12.5 mg, Q6H PRN    Or  ondansetron, 4 mg, Q6H PRN  sodium chloride, 30 mL, PRN        Infusions:      Laboratory Data:  Recent Labs     11/25/20 0420 11/26/20  0500 11/27/20  0320   WBC 7.6 9.8 7.4   HGB 14.5 15.1 14.8    164 175     Recent Labs     11/25/20 0420 11/26/20  0500   * 135*   K 4.2 4.4    99   CO2 24 23   BUN 21* 18   CREATININE 0.9 0.8   GLUCOSE 162* 147*     Recent Labs     11/25/20  0420 11/26/20  0500   AST 27 35   ALT 41 60*   BILITOT 0.3 0.6   ALKPHOS 50 54     Troponin T: No results for input(s): TROPONINI in the last 72 hours. Pro-BNP: No results for input(s): BNP in the last 72 hours. INR: No results for input(s): INR in the last 72 hours. UA:  No results for input(s): NITRITE, COLORU, PHUR, LABCAST, WBCUA, RBCUA, MUCUS, TRICHOMONAS, YEAST, BACTERIA, CLARITYU, SPECGRAV, LEUKOCYTESUR, UROBILINOGEN, BILIRUBINUR, BLOODU, GLUCOSEU, AMORPHOUS in the last 72 hours. Invalid input(s): Akhil Dye  A1C: No results for input(s): LABA1C in the last 72 hours. ABG:  No results for input(s): PHART, UTS0UUQ, PO2ART, LAS0NRF, BEART, HGBAE, P3OTNSCO, CARBOXHGBART in the last 72 hours. Imaging:    Xr Chest Portable    Result Date: 11/22/2020  Bilateral pneumonia, suspicious for Covid infection.  Signed by Dr Rosemary Bernal Kamar Prieto on 11/22/2020 3:13 PM       ASSESSMENT & PLAN:    Principal Problem:    Pneumonia due to COVID-19 virus  Active Problems:    Dyspnea due to COVID-19    Acute respiratory failure with hypoxia (HCC)    Dyslipidemia    GERD (gastroesophageal reflux disease)  Resolved Problems:    * No resolved hospital problems. *        Continue with current medications  Patient finished 5-day course of IV remdesivir  Continue with IV dexamethasone  Increase Lovenox to full anticoagulation protocol 1 mg/kg twice daily  Albuterol inhaler increased to 2 puffs q. hourly  Symbicort inhaler increased to every 6 hours  I spoke with the patient in detail and gave him a pamphlet to read about covalescent plasma  ID evaluated the patient and spoke with him in detail and they both believe, given his prolonged symptoms of COVID-19, covalescent plasma is not of much benefit  Patient started on Ativan 0.5 mg p.o. every 6 hours as needed for anxiety  Advised patient, again, to relax calm down and take things 1 at a time so that his oxygenation improves and then we can people to send him home  He refuses going to skilled nursing facility and wants to go home with home health care, even if it takes a couple of days  Pulmonary consult given for evaluation and further treatment recommendations regarding his persistent hypoxia despite receiving all the treatments  Follow-up on case management for DC planning in a.m. Home O2 evaluation prior to discharge to set up oxygen at home      Repeat labs in a.m. Electrolyte replacement as per protocol. Patient will be monitored very closely on the floor. Further recommendations as per the hospital course.         Discharge Plan: DC planning home with home health care likely in the next couple of days if he remains stable    Patient  is on DVT prophylaxis  Current medications reviewed  Lab work reviewed  Radiology/Chest x-ray films reviewed  Treatment recommendations from suspecialities reviewed, appreciated and agreed with  Discussed with the nurse and addressed all questions/concerns  Discussed with Patient and/or Family at the bedside in detail . .. they understand and agree with the management plan. Corby Mcgarry MD  11/27/2020 9:36 PM      DISCLAIMER: This note was created with electronic voice recognition which does have occasional errors. If you have any questions regarding the content within the note please do not hesitate to contact me. .. Thanks.

## 2020-11-28 NOTE — PROGRESS NOTES
Pt O2 eval.    Pt is on O2 at 4L sat is 89 to 90. When pt removed O2 his sat dropped to 84 in just a few min. Pt will need home O2.

## 2020-11-28 NOTE — CARE COORDINATION
CSN                                    Req/Control # [Problem retrieving Specimen ID]                                   Order Date:  2020  [id number]                                          Patient Information      Name:  Isabelle Chambers  :  1965  Age:  60 y.o. Address:  [address] [facility], [address] [place] [address]   Zip:  [address]  PCP: Yasmin Ríos MD Sex:  Alonzo Norrisler: [id number]  Home Phone: [phone]  Work Phone:    Patient MRN:  [id number]    Alt Patient ID:  [id number]  PCP Phone: [phone]       Authorizing Provider Information       AUTHORIZING PROVIDER: Gaurav Beach MD  Physician ID: [id number]  NPI:  [id number]  Site:   Address: [address] [place] [address],[address] [place], [place] [address]   Phone: [phone]  Fax: [phone]               EQUIPMENT ORDERED  DME Order for Home Oxygen as OP [MVV970] (ORD   #:   [id number]) Priority  Routine Class  Hospital Performed        Associated Diagnosis:          Comments:    You must complete the order parameters below and add the medical necessity documentation for this DME in a separate note.     Stationary Oxygen Concentrator at 4 lpm via Nasal Cannula     Stationary Prescribed at:  Continuous     Portable Gaseous O2 System and contents at 4 lpm via Nasal Cannula     Non Applicable     Diagnosis: Hypoxia secondary to COVID-19 pneumonia  Length of need: 3 Months            Scheduling Instructions:                                 Specimen Source             Collection Date    Collection Time    Order Status    Expected Date                Electronically Signed By  Gaurav Beach MD Date  2020           Responsible Party Yazan Eason     Guar[employer]Act[employer]   Relationship Account Type Home Phone   Brinton Cowden B - [id number]* [address] / [place]ha [place], [facility] Self P/F [phone]   Employer   Work Phone   [employer]     Primary Insurance  Insurance/Subscriber ID:  [id number] Name: MAGDIEL BOWSER              Relationship to Patient: SelfSigned ABN: N    Payor Name:  Jas[place]ir 4:  [facility]   Group: [id number]  Worker's Comp Date of Injury:

## 2020-11-28 NOTE — PROGRESS NOTES
Infectious Diseases Progress Note    Patient:  Mady Durham  YOB: 1965  MRN: [id number]   Admit date: 11/22/2020   Admitting Physician: Nav Hardwick MD  Primary Care Physician: Mohit Molina MD    Chief Complaint/Interval History: He is feeling better. Computer indicates he was on 50% facemask and now 6 L of oxygen per nasal cannula. Indicates he has been off the facemask since yesterday evening. He appears to be on 6 L of oxygen currently. Saturations 91 to 92%. Indicates hospitalist told him he could likely go home later today. Home oxygen assessment is not yet been performed. He does not seem to have significant sputum production. He does indicate he was ambulating in the room some to and from the bathroom and he feels he is tolerating exertion reasonably well. He would like to go home when feasible. He prefers to go home sooner rather than later if possible. He has been using his incentive spirometer.     In/Out  No intake or output data in the 24 hours ending 11/28/20 5487  Allergies: No Known Allergies  Current Meds: LORazepam (ATIVAN) tablet 0.5 mg, Q6H PRN  benzonatate (TESSALON) capsule 100 mg, TID PRN  HYDROcodone-homatropine (HYCODAN) 5-1.5 MG per tablet 1 tablet, Q6H PRN  budesonide-formoterol (SYMBICORT) 160-4.5 MCG/ACT inhaler 2 puff, 4x Daily  albuterol sulfate  (90 Base) MCG/ACT inhaler 2 puff, Q4H PRN  albuterol sulfate  (90 Base) MCG/ACT inhaler 2 puff, Q1H While awake  enoxaparin (LOVENOX) injection 120 mg, BID  zinc sulfate (ZINCATE) capsule 50 mg, Daily  melatonin disintegrating tablet 15 mg, Nightly  dexamethasone (DECADRON) injection 6 mg, Q24H  carvedilol (COREG) tablet 25 mg, BID WC  atorvastatin (LIPITOR) tablet 10 mg, Daily  sodium chloride flush 0.9 % injection 10 mL, 2 times per day  sodium chloride flush 0.9 % injection 10 mL, PRN  potassium chloride (KLOR-CON M) extended release tablet 40 mEq, PRN    Or  potassium bicarb-citric acid (EFFER-K) effervescent tablet 40 mEq, PRN    Or  potassium chloride 10 mEq/100 mL IVPB (Peripheral Line), PRN  magnesium sulfate 1 g in dextrose 5% 100 mL IVPB, PRN  acetaminophen (TYLENOL) tablet 650 mg, Q6H PRN    Or  acetaminophen (TYLENOL) suppository 650 mg, Q6H PRN  polyethylene glycol (GLYCOLAX) packet 17 g, Daily PRN  promethazine (PHENERGAN) tablet 12.5 mg, Q6H PRN    Or  ondansetron (ZOFRAN) injection 4 mg, Q6H PRN  famotidine (PEPCID) tablet 20 mg, BID  Vitamin D (CHOLECALCIFEROL) tablet 6,000 Units, Daily  vitamin C (ASCORBIC ACID) tablet 500 mg, TID  pantoprazole (PROTONIX) tablet 40 mg, QAM AC  0.9 % sodium chloride bolus, PRN      Review of Systems no nausea or diarrhea    VitalSigns:  /71   Pulse 74   Temp 97.5 °F (36.4 °C)   Resp 16   Ht 6' 7\" (2.007 m)   Wt 250 lb 6 oz (113.6 kg)   SpO2 91%   BMI 28.21 kg/m²      Physical Exam  Talked with him across his room through open door. He looks comfortable. He was not tachypneic at rest.  He was not coughing. He appeared to be moving air well. He looks stronger today. He overall appears much less anxious than 2 days ago. Lab Results:  CBC:   Recent Labs     11/26/20  0500 11/27/20  0320 11/28/20  0325   WBC 9.8 7.4 9.1   HGB 15.1 14.8 15.5    175 236     BMP:  Recent Labs     11/26/20  0500   *   K 4.4   CL 99   CO2 23   BUN 18   CREATININE 0.8   GLUCOSE 147*     CultureResults: No new results. Blood cultures November 22, 2020 were final no growth. Radiology: None    Additional Studies Reviewed:  None    Impression:  COVID-19 pneumonia. He is improving. His oxygen requirements appear to be diminishing. He does not appear to be manifesting signs or symptoms to suggest secondary bacterial infection. Pleased with his progress over the last 48 hours and over the last 24 hours in particular.     Recommendations:  He is going to have a home O2 assessment  If it appears he could manage at home on 4 to 6 L or less and is willing to follow-up closely with his primary care provider, discharge home would not be unreasonable  Do not feel he needs antibiotic treatment  Would suggest continuing dexamethasone 6 mg orally daily through December 2, 2020  If he is discharged home by hospitalist and has follow-up that can be arranged through his primary care provider's office he can follow-up with me as needed.     Alina Erickson MD

## 2020-11-28 NOTE — PLAN OF CARE
[facility] NEPHROLOGY PROGRESS NOTE   Alyssia Macario 76 y o  male MRN: [id number]  Unit/Bed#: E2 -01 Encounter: [id number]  Reason for Consult: JIMY    ASSESSMENT/PLAN:  1  Acute kidney injury (POA):  Suspect secondary to prerenal, relative hypotension periodically, ACE-inhibitor, and severe anemia requiring transfusion  -baseline creatinine 0 9-1 0  -peak creatinine 1 9, current creatinine 0 96  -IV fluids restarted for pre and post CT scan for PE study-which was positive for PE  -will continue IV fluids for now  -negative 1 2 L overnight of urine  -avoid nephrotoxins  -avoid hypotension  -continue to trend I/O, lab values volume status    2  Hyperkalemia:  Suspect secondary to AK I with previous ACE-inhibitor use  - current potassium 4 5  -continue with low-potassium diet-currently NPO  -avoid Ace  -will continue trend    3  Anemia:  Suspect postop  -status post transfusion x2 yesterday  -now with concerns for possible bleed with recent heparin drip-now off  -stat hemoglobin 7 8  -per primary team    4  Hypertension:  BP acceptable  -continue to trend at this time  -avoid ACE-inhibitor    5  Femur fracture:  Status post ORIF  -orthopedics following    6  Positive for PE:  Heparin drip started overnight  -heparin drip currently off with recent scrotal swelling and right leg  -per primary team          SUBJECTIVE:  Patient seen and examined  Denies chest pain shortness of breath  Having some right leg discomfort       OBJECTIVE:  Current Weight: Weight - Scale: 84 7 kg (186 lb 11 7 oz)  Vitals:    12/06/19 2019 12/06/19 2300 12/07/19 0530 12/07/19 0732   BP: 136/65 133/67  153/67   BP Location:    Left arm   Pulse: (!) 106 91  97   Resp: 20 20  18   Temp: (!) 97 3 °F (36 3 °C) 97 8 °F (36 6 °C)  99 4 °F (37 4 °C)   TempSrc: Tympanic Tympanic  Tympanic   SpO2: 98% 97%  95%   Weight:   84 7 kg (186 lb 11 7 oz)    Height:           Intake/Output Summary (Last 24 hours) at 12/7/2019 0949  Last data filed at 12/7/2019 0818  Gross per 24 hour   Intake 691 67 ml   Output 1175 ml   Net -483 33 ml     General:  No acute distress, lying in bed, hard hearing  Skin:  Cool, pale, diaphoretic  Eyes:  Sclera anicteric  ENMT:  Mucous membranes moist  Neck:  Supple without JVD  Respiratory:  Clear on auscultation without crackles, rhonchi, wheezes  Cardiac:  Regular rate and rhythm, heart rate slightly tachycardic  Extremities:  Right leg swollen  GI:  Soft, nontender, no distention, positive bowel sounds  Neuro:  Alert oriented and awake  Psych:  Appropriate affect    Medications:    Current Facility-Administered Medications:     acetaminophen (TYLENOL) tablet 650 mg, 650 mg, Oral, Q6H PRN, Angelika Dowling PA-C, 650 mg at 12/05/19 0755    atorvastatin (LIPITOR) tablet 5 mg, 5 mg, Oral, Daily, Angelika Dowling, PA-C, 5 mg at 12/06/19 1845    calcium carbonate (TUMS) chewable tablet 1,000 mg, 1,000 mg, Oral, Daily PRN, MAIN Valdez-C    citalopram (CeleXA) tablet 40 mg, 40 mg, Oral, Daily, Angelika Dowling, PA-C, 40 mg at 12/06/19 6379    docusate sodium (COLACE) capsule 100 mg, 100 mg, Oral, BID, Angelika Dowling PA-C, 100 mg at 12/06/19 1745    heparin (porcine) 25,000 units in 250 mL infusion (premix), 3-30 Units/kg/hr (Order-Specific), Intravenous, Titrated, Isrrael Hence, PA-C, Last Rate: 15 3 mL/hr at 12/07/19 0337, 18 Units/kg/hr at 12/07/19 0337    heparin (porcine) injection 3,400 Units, 3,400 Units, Intravenous, PRN, Isrrael Hence, PA-C    heparin (porcine) injection 6,800 Units, 6,800 Units, Intravenous, PRN, Isrrael Hence, PA-C    hydrALAZINE (APRESOLINE) injection 5 mg, 5 mg, Intravenous, Q4H PRN, Angelika Dowling PA-C    morphine injection 2 mg, 2 mg, Intravenous, Q4H PRN, Angelika Dowling PA-C    niacin tablet 500 mg, 500 mg, Oral, Daily With Breakfast, Maurice Hughes PA-C, 500 mg at 12/06/19 0841    ondansetron (ZOFRAN) injection 4 mg, 4 mg, Intravenous, Q6H PRN, Angelika Dowling PA-C    oxyCODONKARISSA (ROXICODONE) IR tablet 2 5 mg, 2 5 mg, Oral, Q4H PRN, Twila Arnold, PA-C, 2 5 mg at 12/06/19 5926    oxyCODONE-acetaminophen (PERCOCET) 5-325 mg per tablet 2 tablet, 2 tablet, Oral, Q4H PRN, Twila Bur, PA-C, 2 tablet at 12/07/19 4619    pantoprazole (PROTONIX) EC tablet 40 mg, 40 mg, Oral, Early Morning, Twila Arnold, PA-C, 40 mg at 12/07/19 7121    senna (SENOKOT) tablet 8 6 mg, 1 tablet, Oral, Daily, Twila Arnold, PA-C, 8 6 mg at 12/06/19 9540    sodium chloride 0 9 % infusion, 75 mL/hr, Intravenous, Continuous, Gopi Howell PA-C, Last Rate: 75 mL/hr at 12/07/19 0333, 75 mL/hr at 12/07/19 0333    Laboratory Results:  Results from last 7 days   Lab Units 12/07/19  0920 12/07/19  0425 12/07/19  0326 12/06/19  1508 12/06/19  0754 12/06/19  0451 12/05/19  1204 12/05/19  0442 12/04/19  1840 12/04/19  0424 12/04/19  0423 12/03/19  1214   WBC Thousand/uL  --  10 76* 10 73*  --   --  11 23*  --  13 86* 15 36* 12 27*  --  16 04*   HEMOGLOBIN g/dL 7 8* 8 3* 7 8* 7 5* 6 3* 6 3*  --  8 2* 8 9* 11 5*  --  13 4   HEMATOCRIT % 24 2* 26 1* 24 0* 23 2* 19 7* 19 1*  --  25 8* 27 7* 35 0*  --  40 4   PLATELETS Thousands/uL  --  174 174  --   --  169  --  181 205 247  --  256   POTASSIUM mmol/L  --  4 5  --   --   --  4 6 4 3 5 6*  --   --  4 8 4 9   CHLORIDE mmol/L  --  106  --   --   --  107  --  108  --   --  104 104   CO2 mmol/L  --  26  --   --   --  27  --  26  --   --  25 29   BUN mg/dL  --  32*  --   --   --  38*  --  38*  --   --  24 12   CREATININE mg/dL  --  0 96  --   --   --  1 24  --  1 94*  --   --  1 81* 1 08   CALCIUM mg/dL  --  7 7*  --   --   --  7 4*  --  7 1*  --   --  7 9* 8 7

## 2020-11-28 NOTE — PROGRESS NOTES
Comprehensive Nutrition Assessment    Type and Reason for Visit:  Initial, RD Nutrition Re-Screen/LOS    Nutrition Recommendations/Plan: continue to monitor po intake. Start ONS as needed    Nutrition Assessment:  Per documentation in chart pt aappears adequately nourished. Did not visit pt d/t COVID isolation. Malnutrition Assessment:  Malnutrition Status:  No malnutrition    Context:  Acute Illness     Findings of the 6 clinical characteristics of malnutrition:  Energy Intake:  1 - 75% or less of estimated energy requirements for 7 or more days  Weight Loss:  1 - 5% over 1 month     Body Fat Loss:        Muscle Mass Loss:  Unable to assess    Fluid Accumulation:  No significant fluid accumulation Extremities   Strength:  Not Performed    Nutrition Related Findings:     Adequately nourished    Wounds:  None       Current Nutrition Therapies:    DIET GENERAL; Anthropometric Measures:  · Height: 6' 7\" (200.7 cm)  · Current Body Weight: 250 lb 6 oz (113.6 kg)   · Admission Body Weight: 260 lb (117.9 kg)(stated)    · Usual Body Weight: 264 lb (119.7 kg)(10/2020)     · Ideal Body Weight: 220 lbs; % Ideal Body Weight 113.8 %   · BMI: 28.2  · Adjusted Body Weight:  ; No Adjustment   · BMI Categories: Overweight (BMI 25.0-29. 9)       Nutrition Diagnosis:   · Inadequate protein-energy intake related to increase demand for energy/nutrients as evidenced by weight loss, weight loss greater than or equal to 5% in 1 month      Nutrition Interventions:   Food and/or Nutrient Delivery:  Continue Current Diet  Nutrition Education/Counseling:  No recommendation at this time   Coordination of Nutrition Care:  Continue to monitor while inpatient    Goals:  PO intake 50% or greater.   Weight stable       Nutrition Monitoring and Evaluation:   Behavioral-Environmental Outcomes:      Food/Nutrient Intake Outcomes:  Food and Nutrient Intake  Physical Signs/Symptoms Outcomes:  Biochemical Data, Skin, Weight, Nutrition Focused Physical Findings     Discharge Planning:    Continue current diet     Electronically signed by Cesar Yadav MS, RD, LD on 11/28/20 at 11:50 AM CST    Contact: [phone]

## 2020-11-30 ENCOUNTER — TELEPHONE (OUTPATIENT)
Dept: FAMILY MEDICINE CLINIC | Facility: CLINIC | Age: 55
End: 2020-11-30

## 2020-11-30 ENCOUNTER — CARE COORDINATION (OUTPATIENT)
Dept: CASE MANAGEMENT | Age: 55
End: 2020-11-30

## 2020-11-30 DIAGNOSIS — R05.9 COUGH: Primary | ICD-10-CM

## 2020-11-30 NOTE — TELEPHONE ENCOUNTER
"Discharge note from [facility]      \"11/28/2020  Patient is feeling much better today. He is not as anxious as he was yesterday. His O2 sats are holding well in 90s on 4-6 L of oxygen via nasal cannula. We did a home oxygen evaluation test which he failed and he would be discharged home on 4 L of oxygen via nasal cannula. We will continue with his p.o. dexamethasone until he completes the 10 days course. He is advised to follow-up closely with his PCP and ID as an outpatient, self quarantine himself adequately at home, set up with [facility] and take Eliquis for 1 month as directed. I will prescribe him citalopram 20 mg p.o. daily to help with his anxiety.    Home Medication Instructions BERNADETTE:[id number]   Printed on:11/28/20 9059   Medication Information     albuterol sulfate HFA (VENTOLIN HFA) 108 (90 Base) MCG/ACT inhaler  Inhale 2 puffs into the lungs 4 times daily as needed for Shortness of Breath    apixaban (ELIQUIS) 2.5 MG TABS tablet  Take 1 tablet by mouth 2 times daily    atorvastatin (LIPITOR) 10 MG tablet  Take 10 mg by mouth daily    carvedilol (COREG) 25 MG tablet  Take 25 mg by mouth 2 times daily (with meals)    citalopram (CELEXA) 20 MG tablet  Take 1 tablet by mouth daily    dexamethasone (DECADRON) 6 MG tablet  Take 1 tablet by mouth every 6 hours for 4 days    esomeprazole Magnesium (NEXIUM) 20 MG PACK  Take 20 mg by mouth daily\"    "

## 2020-11-30 NOTE — TELEPHONE ENCOUNTER
Requested Prescriptions     Pending Prescriptions Disp Refills   • HYDROcod Polst-CPM Polst ER (Tussionex Pennkinetic ER) 10-8 MG/5ML ER suspension 100 mL 0     Sig: Take 5 mL by mouth Every 12 (Twelve) Hours As Needed for Cough.

## 2020-11-30 NOTE — TELEPHONE ENCOUNTER
PATIENT'S WIFE STATING THAT PATIENT WAS AT [facility] 11/22/2020- 11/28/2020 FOR COVID 19.    HE HAS A PRESCRIPTION FOR ALBUTEROL BUT NOT FOR THE SYMBICORT, DOES HE STILL NEED TO BE ON THIS?  HE IS ON ZINC AND VITAIMN C. YARED WOULD LIKE TO KNOW SHOULD HE STAY ON THIS AND IF SO, HOW MUCH. SHE WOULD LIKE TO ALSO KNOW IF SHE AND HER SON SHOULD ALSO BE ON IT SINCE THEY HAVE BEEN EXPOSED.  HE IS ON ELLIQUS  2.5 MG 2 TIMES A DAY AND YARED WOULD LIKE TO VERIFY THAT IS  STILL CORRECT.  HE IS ON CELEXA 20 MG AND HE IS ON  DECADRON 6 MG EVERY 6 HOURS, WHEN HE RUNS OUT, WILL HE NEED A REFILL?  YARED  STATES THAT HE IS NOT SLEEPING AND SHE WOULD LIKE TO TALK TO SOMEONE ABOUT SLEEP MEDICATION. AND PATIENT IS COUGHING AND SHE WOULD LIKE TO TALK TO SOMEONE ABOUT EITHER COUGH DROPS OR MEDICINE.       PLEASE CALL BACK [phone] (H)      [id number] - [place] - [address] AT ACROSS FROM [address] - [phone]  - [phone]   [phone]

## 2020-11-30 NOTE — TELEPHONE ENCOUNTER
Stay on all meds for now---see me next wseek in the office and we will go over everything--ok to resume symbicort as well

## 2020-11-30 NOTE — CARE COORDINATION
Placed a call to [address] [place] [address], spoke with Messi Magana. She reported that they do have a referral to see patient but are awaiting verification of PCP and agreement to follow up for [facility] services. Asked that someone give him a call and let him know what is going on.

## 2020-11-30 NOTE — TELEPHONE ENCOUNTER
Tell Jennie we dont have any info from [facility] to verify the scripts ---can she get us some info?>?

## 2020-11-30 NOTE — CARE COORDINATION
voiced understanding. He said that he has all of his meds and is taking them. He did not have any questions about them. He did know to stop the Zithromax and Medrol dose pack from before. He said he has been in contact with Dr. Nohelia Powell office about a virtual visit follow up. They are supposed to be calling him back. He is eating better, drinking more. He said he has not slept very well since this all started, but is resting plenty. He denied any fever, aches, sore throat, other symptoms. He did say that the health department got in touch with him when he was first diagnosed and has since called him and released him. He said he was actually in the hospital when that happened. Discussed keeping himself somewhat isolated and away from others until he is symptom free, has completed medications and has been cleared by his PCP. Informed of CTC process, purpose, future calls, etc.  Ensured to have CTN contact information. Encouraged to call as needed for new issues, questions, etc.  CTN to follow up as indicated. Challenges to be reviewed by the provider   Additional needs identified to be addressed with provider No    Discussed COVID-19 related testing which was available at this time. Test results were positive. Patient informed of results, if available? Already aware. Advance Care Planning:   Does patient have an Advance Directive:  not on file. Was this a readmission? No  Patient stated reason for admission: \"I was just getting worse. I was having more trouble breathing. \"  Patients top risk factors for readmission: medical condition, physical functional ability    Care Transition Nurse (CTN) contacted the patient by telephone to perform post hospital discharge assessment. Verified name and  with patient as identifiers. Provided introduction to self, and explanation of the CTN role.      CTN reviewed discharge instructions, medical action plan and red flags with patient who verbalized understanding. Patient given an opportunity to ask questions and does not have any further questions or concerns at this time. Were discharge instructions available to patient? Yes. Reviewed appropriate site of care based on symptoms and resources available to patient including: PCP, Urgent care clinics, Home health and When to call 911. The patient agrees to contact the PCP office for questions related to their healthcare. Medication reconciliation was performed with patient, who verbalizes understanding of administration of home medications. Advised obtaining a 90-day supply of all daily and as-needed medications. Covid Risk Education    Patient has following risk factors of: pneumonia. Education provided regarding infection prevention, and signs and symptoms of COVID-19 and when to seek medical attention with patient who verbalized understanding. Discussed exposure protocols and quarantine From CDC: Are you at higher risk for severe illness?   and given an opportunity for questions and concerns. The patient agrees to contact the COVID-19 hotline [phone] or PCP office for questions related to COVID-19. For more information on steps you can take to protect yourself, see CDC's How to Protect Yourself     Patient/family/caregiver given information for [facility] and agrees to enroll no  Patient's preferred e-mail: declines  Patient's preferred phone number: declines    Discussed follow-up appointments. If no appointment was previously scheduled, appointment scheduling offered: Yes. Non-[facility] follow up appointment(s): Patient is awaiting a call back for VV. Plan for follow-up call in 3-5 days based on severity of symptoms and risk factors.   Plan for next call: - disease process mgmt, symptom mgmt, diet/hydration, pain control needs, medication mgmt, activity level, home safety needs, infection control, fall precautions, seeking medical attention, who/when to call prn any needs, etc.    CTN provided contact information for future needs.         Kamari Baig RN

## 2020-12-01 ENCOUNTER — TELEPHONE (OUTPATIENT)
Dept: FAMILY MEDICINE CLINIC | Facility: CLINIC | Age: 55
End: 2020-12-01

## 2020-12-03 ENCOUNTER — CARE COORDINATION (OUTPATIENT)
Dept: CASE MANAGEMENT | Age: 55
End: 2020-12-03

## 2020-12-03 NOTE — CARE COORDINATION
[facility] Transitions Follow Up Call    12/3/2020    Patient: Manuel Orozco  Patient : 1965   MRN: [id number]  Reason for Admission: Pneumonia, COVID 19   Discharge Date: 20 RARS: Readmission Risk Score: 15         Spoke with: L[facility] Transitions Subsequent and Final Call    Schedule Follow Up Appointment with PCP:  Completed  Subsequent and Final Calls  Have your medications changed?:  No  Do you have any questions related to your medications?:  No  Do you currently have any active services?:  Yes  Are you currently active with any services?:  Home Health  Do you have any needs or concerns that I can assist you with?:  No  Identified Barriers:  None  Care Transitions Interventions  Other Interventions: Follow Up  No future appointments. Placed a call to the number listed for patient and spoke with him for a follow up call. He reported that he is getting better a little at a time. He said he can tell a little difference in his breathing. He is still having some cough, but it is not as bad as it was previously. He said he is able to get up and walk around more and is actually going outside some. He said he has been released from quarantine. He said home health is coming. PT didn't think he needed them, but the nurse is coming back. He is eating better, drinking well. He is sleeping well. He is not aware of any issues at this time. Reviewed watching for ongoing improvement in symptoms and to report any changes. CTN to follow up in a few days. Needs to be reviewed by the provider   Additional needs identified to be addressed with provider No  none  Discussed COVID-19 related testing which was available at this time. Test results were positive. Patient informed of results, if available? Already aware         Care Transition Nurse (CTN) contacted the patient by telephone to follow up after admission.  Verified name and  with patient as identifiers. Addressed changes since last contact: home health has visited, released from quarantine by health department  Discharged needs reviewed: none  Follow up appointment completed? Yes    Discussed appropriate site of care based on symptoms and resources available to patient including: PCP. The patient agrees to contact the PCP office for questions related to their healthcare. Patients top risk factors for readmission: medical condition     Interventions to address risk factors: Education of patient/family/caregiver/guardian to support self-management-- disease process mgmt, symptom mgmt, diet/hydration, pain control needs, medication mgmt, activity level, home safety needs, infection control, fall precautions, seeking medical attention, who/when to call prn any needs, etc.    Plan for follow-up call in 5-7 days based on severity of symptoms and risk factors. Plan for next call: - disease process mgmt, symptom mgmt, diet/hydration, pain control needs, medication mgmt, activity level, home safety needs, infection control, fall precautions, seeking medical attention, who/when to call prn any needs, etc.    CTN provided contact information for future needs.           Rony Chowdhury RN

## 2020-12-08 ENCOUNTER — OFFICE VISIT (OUTPATIENT)
Dept: FAMILY MEDICINE CLINIC | Facility: CLINIC | Age: 55
End: 2020-12-08

## 2020-12-08 VITALS — WEIGHT: 238 LBS | HEIGHT: 78 IN | RESPIRATION RATE: 16 BRPM | BODY MASS INDEX: 27.54 KG/M2

## 2020-12-08 DIAGNOSIS — J12.82 PNEUMONIA DUE TO COVID-19 VIRUS: Primary | ICD-10-CM

## 2020-12-08 DIAGNOSIS — U07.1 PNEUMONIA DUE TO COVID-19 VIRUS: Primary | ICD-10-CM

## 2020-12-08 PROCEDURE — 99213 OFFICE O/P EST LOW 20 MIN: CPT | Performed by: FAMILY MEDICINE

## 2020-12-08 RX ORDER — CITALOPRAM 20 MG/1
20 TABLET ORAL
COMMUNITY
Start: 2020-11-28

## 2020-12-08 RX ORDER — ALBUTEROL SULFATE 90 UG/1
2 AEROSOL, METERED RESPIRATORY (INHALATION)
COMMUNITY
Start: 2020-11-28

## 2020-12-08 NOTE — PROGRESS NOTES
"Subjective   yT Webb is a 55 y.o. male.     Chief Complaint   Patient presents with   • Follow-up     [facility]       History of Present Illness     recently hospitalized at [facility] for covid 19--no sob or cough--off oxygen now----denies any fever      Current Outpatient Medications:   •  albuterol sulfate  (90 Base) MCG/ACT inhaler, Inhale 2 puffs., Disp: , Rfl:   •  apixaban (ELIQUIS) 2.5 MG tablet tablet, Take 2.5 mg by mouth., Disp: , Rfl:   •  citalopram (CeleXA) 20 MG tablet, Take 20 mg by mouth., Disp: , Rfl:   •  atorvastatin (LIPITOR) 10 MG tablet, TAKE 1 TABLET BY MOUTH EVERY DAY, Disp: 90 tablet, Rfl: 1  •  carvedilol (COREG) 25 MG tablet, TAKE 1 TABLET BY MOUTH TWICE A DAY WITH MEALS, Disp: 180 tablet, Rfl: 1  •  esomeprazole (nexIUM) 40 MG capsule, TAKE 1 CAPSULE DAILY       *PERRIGO*, Disp: 90 capsule, Rfl: 1  No Known Allergies    Past Medical History:   Diagnosis Date   • GERD (gastroesophageal reflux disease)    • Hyperlipidemia    • Hypertension      Past Surgical History:   Procedure Laterality Date   • HERNIA REPAIR         Review of Systems   Constitutional: Negative.    HENT: Negative.    Eyes: Negative.    Respiratory: Positive for cough.    Cardiovascular: Negative.    Gastrointestinal: Negative.    Endocrine: Negative.    Genitourinary: Negative.    Musculoskeletal: Negative.    Skin: Negative.    Allergic/Immunologic: Negative.    Neurological: Negative.    Hematological: Negative.    Psychiatric/Behavioral: Negative.        Objective  Resp 16   Ht 200.7 cm (79\")   Wt 108 kg (238 lb)   BMI 26.81 kg/m²   Physical Exam  Vitals signs and nursing note reviewed.   Constitutional:       Appearance: Normal appearance.   HENT:      Head: Normocephalic and atraumatic.      Nose: Nose normal.      Mouth/Throat:      Mouth: Mucous membranes are dry.   Eyes:      Extraocular Movements: Extraocular movements intact.      Pupils: Pupils are equal, round, and reactive to light.   Neck:     "  Musculoskeletal: Normal range of motion and neck supple.   Cardiovascular:      Rate and Rhythm: Normal rate and regular rhythm.      Pulses: Normal pulses.      Heart sounds: Normal heart sounds.   Pulmonary:      Effort: Pulmonary effort is normal.   Abdominal:      General: Abdomen is flat. Bowel sounds are normal.   Musculoskeletal: Normal range of motion.   Skin:     General: Skin is warm and dry.      Capillary Refill: Capillary refill takes less than 2 seconds.   Neurological:      General: No focal deficit present.      Mental Status: He is alert and oriented to person, place, and time.   Psychiatric:         Mood and Affect: Mood normal.         Behavior: Behavior normal.         Thought Content: Thought content normal.         Judgment: Judgment normal.          Assessment/Plan   Diagnoses and all orders for this visit:    1. Pneumonia due to COVID-19 virus (Primary)  -     XR Chest PA & Lateral; Future                 Orders Placed This Encounter   Procedures   • XR Chest PA & Lateral     Standing Status:   Future     Standing Expiration Date:   12/8/2021     Order Specific Question:   Reason for Exam:     Answer:   f/u pneumonia     Order Specific Question:   Does this patient have a diabetic monitoring/medication delivering device on?     Answer:   No       Follow up: 3 month(s)

## 2020-12-09 ENCOUNTER — TELEPHONE (OUTPATIENT)
Dept: FAMILY MEDICINE CLINIC | Facility: CLINIC | Age: 55
End: 2020-12-09

## 2020-12-09 NOTE — TELEPHONE ENCOUNTER
PATIENTS WIFE CALLING IN STATES THAT PATIENT NO LONGER NEEDS OXYGEN AND IS REQUESTING THAT WE CALL LEGACY TO ADVISE SO THAT THEY CAN COME .     PLEASE CONTACT AND ADVISE

## 2020-12-10 ENCOUNTER — CARE COORDINATION (OUTPATIENT)
Dept: CASE MANAGEMENT | Age: 55
End: 2020-12-10

## 2020-12-10 NOTE — CARE COORDINATION
[facility] Trans[facility] Follow Up Call    12/10/2020    Patient: Gwendolyn Valero  Patient : 1965   MRN: [id number]    Discharge Date: 20 RARS: Readmission Risk Score: 13         Spoke with: N/A    Care Transitions Subsequent and Final Call    Subsequent and Final Calls  Are you currently active with any services?:  Home Health  Care Transitions Interventions  Other Interventions:          Attempted to make contact with patient for a routine follow up call without success. Unable to leave a message regarding intent of call and call back information. The call went to a message that the person I am trying to call is not accepting calls at this time. As patient is 14+ days past discharge, will disengage at this time. Follow Up  No future appointments.     Cameron Cortes RN

## 2020-12-17 ENCOUNTER — TELEPHONE (OUTPATIENT)
Dept: FAMILY MEDICINE CLINIC | Facility: CLINIC | Age: 55
End: 2020-12-17

## 2020-12-17 ENCOUNTER — HOSPITAL ENCOUNTER (OUTPATIENT)
Dept: GENERAL RADIOLOGY | Age: 55
Discharge: HOME | End: 2020-12-17
Payer: COMMERCIAL

## 2020-12-17 PROCEDURE — 71046 X-RAY EXAM CHEST 2 VIEWS: CPT

## 2020-12-17 NOTE — TELEPHONE ENCOUNTER
PATIENTS WIFE CALLED WANTING TO LET DR. LOWE KNOW THAT THE PATIENT HAD GOTTEN HIS CHEST XRAY COMPLETED.    GOOD CALL BACK   [phone]

## 2020-12-21 ENCOUNTER — TELEPHONE (OUTPATIENT)
Dept: FAMILY MEDICINE CLINIC | Facility: CLINIC | Age: 55
End: 2020-12-21

## 2020-12-21 DIAGNOSIS — J18.9 PNEUMONIA DUE TO INFECTIOUS ORGANISM, UNSPECIFIED LATERALITY, UNSPECIFIED PART OF LUNG: Primary | ICD-10-CM

## 2020-12-21 NOTE — TELEPHONE ENCOUNTER
Caller: Jennie Webb    Relationship to patient: Emergency Contact    Best call back number: [phone]    Concerns or Questions if Applicable:     Patients wife is requesting a return to work form. He is supposed to  Return 12/25. He also is requesting an order for a chest xray that he will have at [facility]. They state they will come to the office to .

## 2021-01-08 ENCOUNTER — HOSPITAL ENCOUNTER (OUTPATIENT)
Dept: GENERAL RADIOLOGY | Age: 56
Discharge: HOME | End: 2021-01-08
Payer: COMMERCIAL

## 2021-01-08 DIAGNOSIS — J18.9 PNEUMONIA DUE TO INFECTIOUS ORGANISM, UNSPECIFIED LATERALITY, UNSPECIFIED PART OF LUNG: ICD-10-CM

## 2021-01-08 PROCEDURE — 71046 X-RAY EXAM CHEST 2 VIEWS: CPT

## 2021-04-30 RX ORDER — CARVEDILOL 25 MG/1
TABLET ORAL
Qty: 180 TABLET | Refills: 1 | Status: SHIPPED | OUTPATIENT
Start: 2021-04-30 | End: 2021-10-27

## 2021-04-30 RX ORDER — ATORVASTATIN CALCIUM 10 MG/1
TABLET, FILM COATED ORAL
Qty: 90 TABLET | Refills: 1 | Status: SHIPPED | OUTPATIENT
Start: 2021-04-30 | End: 2021-10-27

## 2021-08-31 ENCOUNTER — TELEPHONE (OUTPATIENT)
Dept: FAMILY MEDICINE CLINIC | Facility: CLINIC | Age: 56
End: 2021-08-31

## 2021-08-31 DIAGNOSIS — Z20.822 CLOSE EXPOSURE TO COVID-19 VIRUS: Primary | ICD-10-CM

## 2021-10-07 ENCOUNTER — OFFICE VISIT (OUTPATIENT)
Dept: FAMILY MEDICINE CLINIC | Facility: CLINIC | Age: 56
End: 2021-10-07

## 2021-10-07 VITALS
HEIGHT: 78 IN | BODY MASS INDEX: 31.59 KG/M2 | WEIGHT: 273 LBS | DIASTOLIC BLOOD PRESSURE: 80 MMHG | OXYGEN SATURATION: 98 % | SYSTOLIC BLOOD PRESSURE: 122 MMHG | RESPIRATION RATE: 16 BRPM | HEART RATE: 68 BPM

## 2021-10-07 DIAGNOSIS — Z00.00 WELLNESS EXAMINATION: Primary | ICD-10-CM

## 2021-10-07 DIAGNOSIS — Z12.5 SCREENING FOR MALIGNANT NEOPLASM OF PROSTATE: ICD-10-CM

## 2021-10-07 PROCEDURE — 99396 PREV VISIT EST AGE 40-64: CPT | Performed by: FAMILY MEDICINE

## 2021-10-07 NOTE — PROGRESS NOTES
"Subjective   Ty Webb is a 56 y.o. male.     Chief Complaint   Patient presents with   • Annual Exam     for work insurance       History of Present Illness     here today for annual exam---no medical concerns      Current Outpatient Medications:   •  albuterol sulfate  (90 Base) MCG/ACT inhaler, Inhale 2 puffs., Disp: , Rfl:   •  apixaban (ELIQUIS) 2.5 MG tablet tablet, Take 2.5 mg by mouth., Disp: , Rfl:   •  atorvastatin (LIPITOR) 10 MG tablet, TAKE 1 TABLET BY MOUTH EVERY DAY, Disp: 90 tablet, Rfl: 1  •  carvedilol (COREG) 25 MG tablet, TAKE 1 TABLET BY MOUTH TWICE A DAY WITH MEALS, Disp: 180 tablet, Rfl: 1  •  citalopram (CeleXA) 20 MG tablet, Take 20 mg by mouth., Disp: , Rfl:   •  esomeprazole (nexIUM) 40 MG capsule, TAKE 1 CAPSULE DAILY       *PERRIGO*, Disp: 90 capsule, Rfl: 1  No Known Allergies    Past Medical History:   Diagnosis Date   • GERD (gastroesophageal reflux disease)    • Hyperlipidemia    • Hypertension      Past Surgical History:   Procedure Laterality Date   • HERNIA REPAIR         Review of Systems   Constitutional: Negative.    HENT: Negative.    Eyes: Negative.    Respiratory: Negative.    Cardiovascular: Negative.    Gastrointestinal: Negative.    Endocrine: Negative.    Genitourinary: Negative.    Musculoskeletal: Negative.    Skin: Negative.    Allergic/Immunologic: Negative.    Neurological: Negative.    Hematological: Negative.    Psychiatric/Behavioral: Negative.        Objective  /80   Pulse 68   Resp 16   Ht 200.7 cm (79\")   Wt 124 kg (273 lb)   SpO2 98%   BMI 30.75 kg/m²   Physical Exam  Vitals and nursing note reviewed.   Constitutional:       Appearance: Normal appearance. He is normal weight.   HENT:      Head: Normocephalic and atraumatic.      Nose: Nose normal.      Mouth/Throat:      Mouth: Mucous membranes are moist.   Eyes:      Extraocular Movements: Extraocular movements intact.      Pupils: Pupils are equal, round, and reactive to light. "   Cardiovascular:      Rate and Rhythm: Normal rate and regular rhythm.      Pulses: Normal pulses.      Heart sounds: Normal heart sounds.   Pulmonary:      Effort: Pulmonary effort is normal.      Breath sounds: Normal breath sounds.   Abdominal:      General: Abdomen is flat. Bowel sounds are normal.   Musculoskeletal:      Cervical back: Normal range of motion and neck supple.   Skin:     General: Skin is warm and dry.      Capillary Refill: Capillary refill takes less than 2 seconds.   Neurological:      General: No focal deficit present.      Mental Status: He is alert and oriented to person, place, and time. Mental status is at baseline.   Psychiatric:         Mood and Affect: Mood normal.         Behavior: Behavior normal.         Thought Content: Thought content normal.         Judgment: Judgment normal.         Assessment/Plan   Diagnoses and all orders for this visit:    1. Wellness examination (Primary)  -     PSA Screen  -     CBC & Differential  -     Comprehensive metabolic panel  -     Lipid Panel With / Chol / HDL Ratio  -     Urinalysis without microscopic (no culture) - Urine, Clean Catch    2. Screening for malignant neoplasm of prostate  -     PSA Screen      We discussed  covid 19, vaccines and safety.         Orders Placed This Encounter   Procedures   • PSA Screen     Order Specific Question:   Release to patient     Answer:   Immediate   • Comprehensive metabolic panel     Order Specific Question:   Release to patient     Answer:   Immediate   • Lipid Panel With / Chol / HDL Ratio     Order Specific Question:   Release to patient     Answer:   Immediate   • Urinalysis without microscopic (no culture) - Urine, Clean Catch     Order Specific Question:   Release to patient     Answer:   Immediate   • CBC & Differential       Follow up: 6 month(s)

## 2021-10-08 LAB
ALBUMIN SERPL-MCNC: 4.6 G/DL (ref 3.5–5.2)
ALBUMIN/GLOB SERPL: 2 G/DL
ALP SERPL-CCNC: 67 U/L (ref 39–117)
ALT SERPL-CCNC: 41 U/L (ref 1–41)
APPEARANCE UR: CLEAR
AST SERPL-CCNC: 24 U/L (ref 1–40)
BASOPHILS # BLD AUTO: 0.05 10*3/MM3 (ref 0–0.2)
BASOPHILS NFR BLD AUTO: 0.6 % (ref 0–1.5)
BILIRUB SERPL-MCNC: 0.6 MG/DL (ref 0–1.2)
BILIRUB UR QL STRIP: NEGATIVE
BUN SERPL-MCNC: 14 MG/DL (ref 6–20)
BUN/CREAT SERPL: 12.1 (ref 7–25)
CALCIUM SERPL-MCNC: 9.5 MG/DL (ref 8.6–10.5)
CHLORIDE SERPL-SCNC: 100 MMOL/L (ref 98–107)
CHOLEST SERPL-MCNC: 165 MG/DL (ref 0–200)
CHOLEST/HDLC SERPL: 5.89 {RATIO}
CO2 SERPL-SCNC: 27.9 MMOL/L (ref 22–29)
COLOR UR: YELLOW
CREAT SERPL-MCNC: 1.16 MG/DL (ref 0.76–1.27)
EOSINOPHIL # BLD AUTO: 0.11 10*3/MM3 (ref 0–0.4)
EOSINOPHIL NFR BLD AUTO: 1.3 % (ref 0.3–6.2)
ERYTHROCYTE [DISTWIDTH] IN BLOOD BY AUTOMATED COUNT: 13.1 % (ref 12.3–15.4)
GLOBULIN SER CALC-MCNC: 2.3 GM/DL
GLUCOSE SERPL-MCNC: 122 MG/DL (ref 65–99)
GLUCOSE UR QL: NEGATIVE
HCT VFR BLD AUTO: 48.4 % (ref 37.5–51)
HDLC SERPL-MCNC: 28 MG/DL (ref 40–60)
HGB BLD-MCNC: 16.4 G/DL (ref 13–17.7)
HGB UR QL STRIP: NEGATIVE
IMM GRANULOCYTES # BLD AUTO: 0.02 10*3/MM3 (ref 0–0.05)
IMM GRANULOCYTES NFR BLD AUTO: 0.2 % (ref 0–0.5)
KETONES UR QL STRIP: NEGATIVE
LDLC SERPL CALC-MCNC: 96 MG/DL (ref 0–100)
LEUKOCYTE ESTERASE UR QL STRIP: ABNORMAL
LYMPHOCYTES # BLD AUTO: 2.78 10*3/MM3 (ref 0.7–3.1)
LYMPHOCYTES NFR BLD AUTO: 33.8 % (ref 19.6–45.3)
MCH RBC QN AUTO: 29.4 PG (ref 26.6–33)
MCHC RBC AUTO-ENTMCNC: 33.9 G/DL (ref 31.5–35.7)
MCV RBC AUTO: 86.9 FL (ref 79–97)
MONOCYTES # BLD AUTO: 0.45 10*3/MM3 (ref 0.1–0.9)
MONOCYTES NFR BLD AUTO: 5.5 % (ref 5–12)
NEUTROPHILS # BLD AUTO: 4.81 10*3/MM3 (ref 1.7–7)
NEUTROPHILS NFR BLD AUTO: 58.6 % (ref 42.7–76)
NITRITE UR QL STRIP: NEGATIVE
NRBC BLD AUTO-RTO: 0 /100 WBC (ref 0–0.2)
PH UR STRIP: 6 [PH] (ref 5–8)
PLATELET # BLD AUTO: 164 10*3/MM3 (ref 140–450)
POTASSIUM SERPL-SCNC: 4.4 MMOL/L (ref 3.5–5.2)
PROT SERPL-MCNC: 6.9 G/DL (ref 6–8.5)
PROT UR QL STRIP: NEGATIVE
PSA SERPL-MCNC: 1.1 NG/ML (ref 0–4)
RBC # BLD AUTO: 5.57 10*6/MM3 (ref 4.14–5.8)
SODIUM SERPL-SCNC: 139 MMOL/L (ref 136–145)
SP GR UR: 1.02 (ref 1–1.03)
TRIGL SERPL-MCNC: 238 MG/DL (ref 0–150)
UROBILINOGEN UR STRIP-MCNC: ABNORMAL MG/DL
VLDLC SERPL CALC-MCNC: 41 MG/DL (ref 5–40)
WBC # BLD AUTO: 8.22 10*3/MM3 (ref 3.4–10.8)

## 2021-10-27 RX ORDER — ATORVASTATIN CALCIUM 10 MG/1
TABLET, FILM COATED ORAL
Qty: 90 TABLET | Refills: 1 | Status: SHIPPED | OUTPATIENT
Start: 2021-10-27 | End: 2022-04-21

## 2021-10-27 RX ORDER — CARVEDILOL 25 MG/1
TABLET ORAL
Qty: 180 TABLET | Refills: 1 | Status: SHIPPED | OUTPATIENT
Start: 2021-10-27 | End: 2022-04-21

## 2022-02-09 ENCOUNTER — OFFICE VISIT (OUTPATIENT)
Dept: FAMILY MEDICINE CLINIC | Facility: CLINIC | Age: 57
End: 2022-02-09

## 2022-02-09 VITALS
BODY MASS INDEX: 31.59 KG/M2 | RESPIRATION RATE: 16 BRPM | HEART RATE: 95 BPM | SYSTOLIC BLOOD PRESSURE: 144 MMHG | WEIGHT: 273 LBS | DIASTOLIC BLOOD PRESSURE: 92 MMHG | TEMPERATURE: 97.3 F | HEIGHT: 78 IN | OXYGEN SATURATION: 96 %

## 2022-02-09 DIAGNOSIS — R07.81 RIB PAIN ON LEFT SIDE: ICD-10-CM

## 2022-02-09 DIAGNOSIS — Z23 NEED FOR TDAP VACCINATION: ICD-10-CM

## 2022-02-09 DIAGNOSIS — I10 ESSENTIAL HYPERTENSION: ICD-10-CM
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
## 2022-02-09 PROCEDURE — 99213 OFFICE O/P EST LOW 20 MIN: CPT | Performed by: NURSE PRACTITIONER

## 2022-02-09 RX ORDER — KETOROLAC TROMETHAMINE 10 MG/1
10 TABLET, FILM COATED ORAL EVERY 6 HOURS PRN
Qty: 20 TABLET | Refills: 0 | Status: SHIPPED | OUTPATIENT
Start: 2022-02-09 | End: 2022-02-14 | Stop reason: SDUPTHER

## 2022-02-09 NOTE — PROGRESS NOTES
Please call the patient - X-ray shows irregularities left seventh eighth and ninth and 10 anterior lateral ribs, suspicious for nondisplaced fractures.  Advised patient he may be sore for up to a month but should get better.  If he needs a refill on his Toradol at some point this is okay over the next month.    Electronically signed by JOSÉ LUIS Ngo, 02/09/22, 4:16 PM CST.

## 2022-02-09 NOTE — PROGRESS NOTES
Subjective   Chief Complaint:  Fall, rib pain    History of Present Illness:  This 56 y.o. male was seen in the office today.  Had a fall on the ice, denies hitting head or losing conscious.  Reports struck the left ribs and scratched up arms.    No Known Allergies   Current Outpatient Medications on File Prior to Visit   Medication Sig   • albuterol sulfate  (90 Base) MCG/ACT inhaler Inhale 2 puffs.   • atorvastatin (LIPITOR) 10 MG tablet TAKE 1 TABLET BY MOUTH EVERY DAY   • carvedilol (COREG) 25 MG tablet TAKE 1 TABLET BY MOUTH TWICE A DAY WITH MEALS   • citalopram (CeleXA) 20 MG tablet Take 20 mg by mouth.   • esomeprazole (nexIUM) 40 MG capsule TAKE 1 CAPSULE DAILY       *PERRIGO*   • [DISCONTINUED] apixaban (ELIQUIS) 2.5 MG tablet tablet Take 2.5 mg by mouth.     No current facility-administered medications on file prior to visit.      Past Medical, Surgical, Social, and Family History:  Past Medical History:   Diagnosis Date   • GERD (gastroesophageal reflux disease)    • Hyperlipidemia    • Hypertension      Past Surgical History:   Procedure Laterality Date   • HERNIA REPAIR       Social History     Socioeconomic History   • Marital status:    Tobacco Use   • Smoking status: Never Smoker   • Smokeless tobacco: Never Used   Substance and Sexual Activity   • Alcohol use: No     Family History   Problem Relation Age of Onset   • Breast cancer Mother    • Bone cancer Mother    • Breast cancer Sister      Objective   Physical Exam  Constitutional:       General: He is not in acute distress.  Cardiovascular:      Rate and Rhythm: Normal rate and regular rhythm.      Pulses: Normal pulses.      Heart sounds: No murmur heard.  No friction rub. No gallop.    Pulmonary:      Effort: Pulmonary effort is normal. No respiratory distress.      Breath sounds: Normal breath sounds. No wheezing or rhonchi.   Chest:      Comments: Chest wall discomfort left side  Skin:     Comments: Abrasions bilateral hands and  "forearms   Neurological:      Mental Status: He is alert.     /92   Pulse 95   Temp 97.3 °F (36.3 °C)   Resp 16   Ht 200.7 cm (79\")   Wt 124 kg (273 lb)   SpO2 96%   BMI 30.75 kg/m²     Assessment/Plan   Diagnoses and all orders for this visit:    1. Fall, initial encounter (Primary)  -     XR Ribs Left With PA Chest; Future  -     ketorolac (TORADOL) 10 MG tablet; Take 1 tablet by mouth Every 6 (Six) Hours As Needed for Moderate Pain .  Dispense: 20 tablet; Refill: 0    2. Rib pain on left side  -     XR Ribs Left With PA Chest; Future  -     ketorolac (TORADOL) 10 MG tablet; Take 1 tablet by mouth Every 6 (Six) Hours As Needed for Moderate Pain .  Dispense: 20 tablet; Refill: 0    3. Essential hypertension  Comments:  We are going to treat underlying pain first, if sustains will intervene with other antihypertensives.    Discussion:  Advised and educated plan of care.  Advised to stop over-the-counter Motrin for few days, will give Toradol instead.  Proceed with x-rays, Tdap vaccine this visit.    Patient's Body mass index is 30.75 kg/m². indicating that he is obese (BMI >30). Obesity-related health conditions include the following: hypertension and dyslipidemias. Obesity is unchanged. BMI is is above average; BMI management plan is completed. We discussed portion control and increasing exercise..    Follow-up:  No follow-ups on file.    Electronically signed by JOSÉ LUIS Ngo, 02/09/22, 1:46 PM CST.  "

## 2022-02-10 ENCOUNTER — TELEPHONE (OUTPATIENT)
Dept: FAMILY MEDICINE CLINIC | Facility: CLINIC | Age: 57
End: 2022-02-10

## 2022-02-10 NOTE — TELEPHONE ENCOUNTER
Caller: Ty Webb    Relationship: Self    Best call back number: [phone]    Caller requesting test results: SELF     What test was performed: XRAYS    When was the test performed: 2/9/2022

## 2022-02-10 NOTE — LETTER
JOSÉ LUIS Lovett  [address] [address]  Phone: [phone]  Fax: [phone]      PATIENT NAME: Ty Webb                                                                          YOB: 1965            02/10/2022    To whom it may concern,    Ty Webb should be excused from work 2/10/2022-2/17/2022.    Electronically signed by JOSÉ LUIS Ngo, 02/10/22, 11:48 AM CST.

## 2022-02-10 NOTE — TELEPHONE ENCOUNTER
I got a message from(workmans comp) they are needing medical records from his injury on 02/07/2022 faxed to [phone]

## 2022-02-10 NOTE — TELEPHONE ENCOUNTER
Caller: Jennie Webb    Relationship: Emergency Contact    Best call back number: [phone]    What form or medical record are you requesting: WORK EXCUSE 2/10-2/17/22    Who is requesting this form or medical record from you: WORK EXCUSE    Timeframe paperwork needed: PATIENT IS EXPECTED TO BE AT WORK TONIGHT, HOWEVER WITH FRACTURED RIBS HE CANNOT RETURN TO WORK.          [facility]   ATTN: NATHANAEL CASTRO    FAX# [phone]

## 2022-02-10 NOTE — TELEPHONE ENCOUNTER
Caller: Jennie Webb    Relationship: Emergency Contact    Best call back number: [phone]    What form or medical record are you requesting: WORK EXCUSE    Who is requesting this form or medical record from you: PATIENTS WORK     How would you like to receive the form or medical records (pick-up, mail, fax): EMAIL TO- [email or web address]      Timeframe paperwork needed: ASAP    Additional notes: PATIENTS WIFE STATES THE PATIENTS WORK IS OUT OF WORK CURRENTLY DUE TO HIS FRACTURED RIBS. HE WOULD LIKE A WORK EXCUSE BE WRITTEN FROM TODAY 2/10/22-2/17/22.

## 2022-02-10 NOTE — TELEPHONE ENCOUNTER
Note is sent to [employer].  Can be printed if he needs a hard copy to e-mail.  Advise we can't e-mail work notes.    Electronically signed by JOSÉ LIUS Ngo, 02/10/22, 11:47 AM CST.

## 2022-02-14 DIAGNOSIS — R07.81 RIB PAIN ON LEFT SIDE: ICD-10-CM
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
## 2022-02-14 RX ORDER — KETOROLAC TROMETHAMINE 10 MG/1
10 TABLET, FILM COATED ORAL EVERY 6 HOURS PRN
Qty: 20 TABLET | Refills: 0 | Status: SHIPPED | OUTPATIENT
Start: 2022-02-14

## 2022-02-14 NOTE — TELEPHONE ENCOUNTER
Caller: WebbJennie    Relationship: Emergency Contact    Best call back number: [phone]    Requested Prescriptions:   Requested Prescriptions     Pending Prescriptions Disp Refills   • ketorolac (TORADOL) 10 MG tablet 20 tablet 0     Sig: Take 1 tablet by mouth Every 6 (Six) Hours As Needed for Moderate Pain .        Pharmacy where request should be sent: [facility] - [place], [place] - [address] [facility] [phone] [facility] [phone] FX       Does the patient have less than a 3 day supply:  [x] Yes  [] No    Judy Benson, RegSched Rep   02/14/22 08:00 CST

## 2022-02-22 PROCEDURE — 90715 TDAP VACCINE 7 YRS/> IM: CPT | Performed by: NURSE PRACTITIONER

## 2022-02-22 PROCEDURE — 90471 IMMUNIZATION ADMIN: CPT | Performed by: NURSE PRACTITIONER

## 2022-04-07 ENCOUNTER — OFFICE VISIT (OUTPATIENT)
Dept: FAMILY MEDICINE CLINIC | Facility: CLINIC | Age: 57
End: 2022-04-07

## 2022-04-07 VITALS
OXYGEN SATURATION: 97 % | RESPIRATION RATE: 16 BRPM | HEART RATE: 84 BPM | HEIGHT: 78 IN | DIASTOLIC BLOOD PRESSURE: 92 MMHG | WEIGHT: 279 LBS | BODY MASS INDEX: 32.28 KG/M2 | SYSTOLIC BLOOD PRESSURE: 142 MMHG

## 2022-04-07 DIAGNOSIS — I10 ESSENTIAL HYPERTENSION: Primary | ICD-10-CM

## 2022-04-07 DIAGNOSIS — E78.2 MIXED HYPERLIPIDEMIA: ICD-10-CM

## 2022-04-07 PROCEDURE — 99213 OFFICE O/P EST LOW 20 MIN: CPT | Performed by: FAMILY MEDICINE

## 2022-04-07 RX ORDER — LISINOPRIL 10 MG/1
10 TABLET ORAL DAILY
Qty: 30 TABLET | Refills: 5 | Status: SHIPPED | OUTPATIENT
Start: 2022-04-07 | End: 2022-05-10 | Stop reason: SDUPTHER

## 2022-04-07 NOTE — PROGRESS NOTES
Subjective   Ty Webb is a 56 y.o. male.     Chief Complaint   Patient presents with   • Hypertension     6 mo f/u        History of Present Illness     he is noting his bp to be up without cp or ha--he is tolreain lipior janel freeman sg..his gerd symptoms are stable without dysphiaga..      Current Outpatient Medications:   •  albuterol sulfate  (90 Base) MCG/ACT inhaler, Inhale 2 puffs., Disp: , Rfl:   •  atorvastatin (LIPITOR) 10 MG tablet, TAKE 1 TABLET BY MOUTH EVERY DAY, Disp: 90 tablet, Rfl: 1  •  carvedilol (COREG) 25 MG tablet, TAKE 1 TABLET BY MOUTH TWICE A DAY WITH MEALS, Disp: 180 tablet, Rfl: 1  •  citalopram (CeleXA) 20 MG tablet, Take 20 mg by mouth., Disp: , Rfl:   •  esomeprazole (nexIUM) 40 MG capsule, TAKE 1 CAPSULE DAILY       *PERRIGO*, Disp: 90 capsule, Rfl: 1  •  ketorolac (TORADOL) 10 MG tablet, Take 1 tablet by mouth Every 6 (Six) Hours As Needed for Moderate Pain ., Disp: 20 tablet, Rfl: 0  •  lisinopril (Zestril) 10 MG tablet, Take 1 tablet by mouth Daily., Disp: 30 tablet, Rfl: 5  No Known Allergies    Patient's Body mass index is 31.43 kg/m². indicating that he is obese (BMI >30). Obesity-related health conditions include the following: hypertension and dyslipidemias. Obesity is unchanged. BMI is is above average; BMI management plan is completed. We discussed portion control and increasing exercise..      Past Medical History:   Diagnosis Date   • GERD (gastroesophageal reflux disease)    • Hyperlipidemia    • Hypertension      Past Surgical History:   Procedure Laterality Date   • HERNIA REPAIR         Review of Systems   Constitutional: Negative.    HENT: Negative.    Eyes: Negative.    Respiratory: Negative.    Cardiovascular: Negative.    Gastrointestinal: Negative.    Endocrine: Negative.    Genitourinary: Negative.    Musculoskeletal: Negative.    Skin: Negative.    Allergic/Immunologic: Negative.    Neurological: Negative.    Hematological: Negative.   "  Psychiatric/Behavioral: Negative.        Objective  /92   Pulse 84   Resp 16   Ht 200.7 cm (79\")   Wt 127 kg (279 lb)   SpO2 97%   BMI 31.43 kg/m²   Physical Exam  Vitals and nursing note reviewed.   Constitutional:       Appearance: Normal appearance. He is normal weight.   HENT:      Head: Normocephalic and atraumatic.      Nose: Nose normal.      Mouth/Throat:      Mouth: Mucous membranes are moist.   Eyes:      Extraocular Movements: Extraocular movements intact.      Conjunctiva/sclera: Conjunctivae normal.      Pupils: Pupils are equal, round, and reactive to light.   Cardiovascular:      Rate and Rhythm: Normal rate and regular rhythm.      Pulses: Normal pulses.      Heart sounds: Normal heart sounds.   Pulmonary:      Effort: Pulmonary effort is normal.   Abdominal:      General: Abdomen is flat. Bowel sounds are normal.      Palpations: Abdomen is soft.   Musculoskeletal:         General: Normal range of motion.      Cervical back: Normal range of motion and neck supple.   Skin:     General: Skin is warm and dry.      Capillary Refill: Capillary refill takes less than 2 seconds.   Neurological:      General: No focal deficit present.      Mental Status: He is alert and oriented to person, place, and time. Mental status is at baseline.   Psychiatric:         Mood and Affect: Mood normal.         Behavior: Behavior normal.         Thought Content: Thought content normal.         Judgment: Judgment normal.         Assessment/Plan   Diagnoses and all orders for this visit:    1. Essential hypertension (Primary)  -     Comprehensive metabolic panel  -     Lipid Panel With / Chol / HDL Ratio    2. Mixed hyperlipidemia  -     Comprehensive metabolic panel  -     Lipid Panel With / Chol / HDL Ratio    Other orders  -     lisinopril (Zestril) 10 MG tablet; Take 1 tablet by mouth Daily.  Dispense: 30 tablet; Refill: 5      He will monitor bp and keep me infomed  He will monitor for myagblis     Will " try to lose weight       Orders Placed This Encounter   Procedures   • Comprehensive metabolic panel     Order Specific Question:   Release to patient     Answer:   Immediate   • Lipid Panel With / Chol / HDL Ratio     Order Specific Question:   Release to patient     Answer:   Immediate       Follow up: 6 month(s)

## 2022-04-08 LAB
ALBUMIN SERPL-MCNC: 4.3 G/DL (ref 3.5–5.2)
ALBUMIN/GLOB SERPL: 2 G/DL
ALP SERPL-CCNC: 85 U/L (ref 39–117)
ALT SERPL-CCNC: 29 U/L (ref 1–41)
AST SERPL-CCNC: 20 U/L (ref 1–40)
BILIRUB SERPL-MCNC: 0.3 MG/DL (ref 0–1.2)
BUN SERPL-MCNC: 16 MG/DL (ref 6–20)
BUN/CREAT SERPL: 14.5 (ref 7–25)
CALCIUM SERPL-MCNC: 9 MG/DL (ref 8.6–10.5)
CHLORIDE SERPL-SCNC: 102 MMOL/L (ref 98–107)
CHOLEST SERPL-MCNC: 164 MG/DL (ref 0–200)
CHOLEST/HDLC SERPL: 6.31 {RATIO}
CO2 SERPL-SCNC: 26 MMOL/L (ref 22–29)
CREAT SERPL-MCNC: 1.1 MG/DL (ref 0.76–1.27)
EGFRCR SERPLBLD CKD-EPI 2021: 77.8 ML/MIN/1.73
GLOBULIN SER CALC-MCNC: 2.2 GM/DL
GLUCOSE SERPL-MCNC: 138 MG/DL (ref 65–99)
HDLC SERPL-MCNC: 26 MG/DL (ref 40–60)
LDLC SERPL CALC-MCNC: 76 MG/DL (ref 0–100)
POTASSIUM SERPL-SCNC: 4.1 MMOL/L (ref 3.5–5.2)
PROT SERPL-MCNC: 6.5 G/DL (ref 6–8.5)
SODIUM SERPL-SCNC: 138 MMOL/L (ref 136–145)
TRIGL SERPL-MCNC: 388 MG/DL (ref 0–150)
VLDLC SERPL CALC-MCNC: 62 MG/DL (ref 5–40)

## 2022-04-21 RX ORDER — ATORVASTATIN CALCIUM 10 MG/1
TABLET, FILM COATED ORAL
Qty: 90 TABLET | Refills: 1 | Status: SHIPPED | OUTPATIENT
Start: 2022-04-21 | End: 2022-10-17

## 2022-04-21 RX ORDER — CARVEDILOL 25 MG/1
TABLET ORAL
Qty: 180 TABLET | Refills: 1 | Status: SHIPPED | OUTPATIENT
Start: 2022-04-21 | End: 2022-10-17

## 2022-05-10 RX ORDER — LISINOPRIL 10 MG/1
10 TABLET ORAL DAILY
Qty: 30 TABLET | Refills: 5 | Status: SHIPPED | OUTPATIENT
Start: 2022-05-10 | End: 2022-10-21

## 2022-05-10 NOTE — TELEPHONE ENCOUNTER
Caller: Jennie Webb    Relationship to patient: Emergency Contact    Best call back number: [phone]      Patient is needing:   Needs med switched to [facility]. He is completely out

## 2022-10-07 ENCOUNTER — OFFICE VISIT (OUTPATIENT)
Dept: FAMILY MEDICINE CLINIC | Facility: CLINIC | Age: 57
End: 2022-10-07

## 2022-10-07 VITALS
HEIGHT: 78 IN | WEIGHT: 271 LBS | SYSTOLIC BLOOD PRESSURE: 110 MMHG | DIASTOLIC BLOOD PRESSURE: 86 MMHG | HEART RATE: 76 BPM | OXYGEN SATURATION: 96 % | BODY MASS INDEX: 31.35 KG/M2

## 2022-10-07 DIAGNOSIS — Z12.5 SCREENING FOR MALIGNANT NEOPLASM OF PROSTATE: ICD-10-CM

## 2022-10-07 DIAGNOSIS — I10 ESSENTIAL HYPERTENSION: Primary | ICD-10-CM

## 2022-10-07 DIAGNOSIS — E78.2 MIXED HYPERLIPIDEMIA: ICD-10-CM

## 2022-10-07 DIAGNOSIS — K21.9 GASTROESOPHAGEAL REFLUX DISEASE WITHOUT ESOPHAGITIS: ICD-10-CM

## 2022-10-07 PROCEDURE — 99213 OFFICE O/P EST LOW 20 MIN: CPT | Performed by: FAMILY MEDICINE

## 2022-10-07 NOTE — PROGRESS NOTES
"Subjective   Ty Webb is a 57 y.o. male.     Chief Complaint   Patient presents with   • Follow-up     Pt here for 6 month F/U         History of Present Illness     he notes good bp contro lwithout cp or ha..his gerd symptoms are stablw ituout dysphia..he is toleraign statin manju dexter       Current Outpatient Medications:   •  atorvastatin (LIPITOR) 10 MG tablet, TAKE 1 TABLET BY MOUTH EVERY DAY, Disp: 90 tablet, Rfl: 1  •  carvedilol (COREG) 25 MG tablet, TAKE 1 TABLET BY MOUTH TWICE A DAY WITH MEALS, Disp: 180 tablet, Rfl: 1  •  citalopram (CeleXA) 20 MG tablet, Take 20 mg by mouth., Disp: , Rfl:   •  esomeprazole (nexIUM) 40 MG capsule, TAKE 1 CAPSULE DAILY       *PERRIGO*, Disp: 90 capsule, Rfl: 1  •  lisinopril (Zestril) 10 MG tablet, Take 1 tablet by mouth Daily., Disp: 30 tablet, Rfl: 5  •  albuterol sulfate  (90 Base) MCG/ACT inhaler, Inhale 2 puffs., Disp: , Rfl:   •  ketorolac (TORADOL) 10 MG tablet, Take 1 tablet by mouth Every 6 (Six) Hours As Needed for Moderate Pain ., Disp: 20 tablet, Rfl: 0  No Known Allergies    BMI is >= 30 and <35. (Class 1 Obesity). The following options were offered after discussion;: nutrition counseling/recommendations      Past Medical History:   Diagnosis Date   • GERD (gastroesophageal reflux disease)    • Hyperlipidemia    • Hypertension      Past Surgical History:   Procedure Laterality Date   • HERNIA REPAIR         Review of Systems   Constitutional: Negative.    HENT: Negative.    Eyes: Negative.    Respiratory: Negative.    Cardiovascular: Negative.    Gastrointestinal: Negative.    Endocrine: Negative.    Genitourinary: Negative.    Musculoskeletal: Negative.    Skin: Negative.    Allergic/Immunologic: Negative.    Neurological: Negative.    Hematological: Negative.    Psychiatric/Behavioral: Negative.        Objective  /86   Pulse 76   Ht 200.7 cm (79\")   Wt 123 kg (271 lb)   SpO2 96%   BMI 30.53 kg/m²   Physical Exam  Vitals and nursing " note reviewed.   Constitutional:       Appearance: Normal appearance. He is normal weight.   HENT:      Head: Normocephalic.      Nose: Nose normal.      Mouth/Throat:      Mouth: Mucous membranes are moist.   Eyes:      Extraocular Movements: Extraocular movements intact.      Conjunctiva/sclera: Conjunctivae normal.      Pupils: Pupils are equal, round, and reactive to light.   Cardiovascular:      Rate and Rhythm: Normal rate and regular rhythm.      Pulses: Normal pulses.      Heart sounds: Normal heart sounds.   Pulmonary:      Effort: Pulmonary effort is normal.      Breath sounds: Normal breath sounds.   Abdominal:      General: Abdomen is flat. Bowel sounds are normal.      Palpations: Abdomen is soft.   Musculoskeletal:         General: Normal range of motion.      Cervical back: Normal range of motion.   Skin:     General: Skin is warm and dry.      Capillary Refill: Capillary refill takes less than 2 seconds.   Neurological:      General: No focal deficit present.      Mental Status: He is alert. Mental status is at baseline.   Psychiatric:         Mood and Affect: Mood normal.         Behavior: Behavior normal.         Thought Content: Thought content normal.         Judgment: Judgment normal.         Assessment & Plan   Diagnoses and all orders for this visit:    1. Essential hypertension (Primary)  -     CBC & Differential  -     Comprehensive metabolic panel  -     Lipid Panel With / Chol / HDL Ratio  -     PSA Screen    2. Mixed hyperlipidemia  -     CBC & Differential  -     Comprehensive metabolic panel  -     Lipid Panel With / Chol / HDL Ratio  -     PSA Screen    3. Gastroesophageal reflux disease without esophagitis  -     CBC & Differential  -     Comprehensive metabolic panel  -     Lipid Panel With / Chol / HDL Ratio  -     PSA Screen    4. Screening for malignant neoplasm of prostate  -     PSA Screen      He will mojhitror bp and keep me inffoned  He will monitor fo rdysphgia            Orders Placed This Encounter   Procedures   • Comprehensive metabolic panel     Order Specific Question:   Release to patient     Answer:   Routine Release   • Lipid Panel With / Chol / HDL Ratio     Order Specific Question:   Release to patient     Answer:   Routine Release   • PSA Screen     Order Specific Question:   Release to patient     Answer:   Routine Release   • CBC & Differential       Follow up: 6 month(s)

## 2022-10-17 RX ORDER — ATORVASTATIN CALCIUM 10 MG/1
TABLET, FILM COATED ORAL
Qty: 90 TABLET | Refills: 1 | Status: SHIPPED | OUTPATIENT
Start: 2022-10-17

## 2022-10-17 RX ORDER — CARVEDILOL 25 MG/1
TABLET ORAL
Qty: 180 TABLET | Refills: 1 | Status: SHIPPED | OUTPATIENT
Start: 2022-10-17

## 2022-10-18 LAB
ALBUMIN SERPL-MCNC: 4.5 G/DL (ref 3.5–5.2)
ALBUMIN/GLOB SERPL: 2.3 G/DL
ALP SERPL-CCNC: 69 U/L (ref 39–117)
ALT SERPL-CCNC: 32 U/L (ref 1–41)
AST SERPL-CCNC: 21 U/L (ref 1–40)
BASOPHILS # BLD AUTO: 0.05 10*3/MM3 (ref 0–0.2)
BASOPHILS NFR BLD AUTO: 0.6 % (ref 0–1.5)
BILIRUB SERPL-MCNC: 0.4 MG/DL (ref 0–1.2)
BUN SERPL-MCNC: 18 MG/DL (ref 6–20)
BUN/CREAT SERPL: 15.8 (ref 7–25)
CALCIUM SERPL-MCNC: 9.3 MG/DL (ref 8.6–10.5)
CHLORIDE SERPL-SCNC: 101 MMOL/L (ref 98–107)
CHOLEST SERPL-MCNC: 164 MG/DL (ref 0–200)
CHOLEST/HDLC SERPL: 6.07 {RATIO}
CO2 SERPL-SCNC: 26 MMOL/L (ref 22–29)
CREAT SERPL-MCNC: 1.14 MG/DL (ref 0.76–1.27)
EGFRCR SERPLBLD CKD-EPI 2021: 74.1 ML/MIN/1.73
EOSINOPHIL # BLD AUTO: 0.13 10*3/MM3 (ref 0–0.4)
EOSINOPHIL NFR BLD AUTO: 1.5 % (ref 0.3–6.2)
ERYTHROCYTE [DISTWIDTH] IN BLOOD BY AUTOMATED COUNT: 13.1 % (ref 12.3–15.4)
GLOBULIN SER CALC-MCNC: 2 GM/DL
GLUCOSE SERPL-MCNC: 139 MG/DL (ref 65–99)
HCT VFR BLD AUTO: 48.7 % (ref 37.5–51)
HDLC SERPL-MCNC: 27 MG/DL (ref 40–60)
HGB BLD-MCNC: 16.5 G/DL (ref 13–17.7)
IMM GRANULOCYTES # BLD AUTO: 0.04 10*3/MM3 (ref 0–0.05)
IMM GRANULOCYTES NFR BLD AUTO: 0.5 % (ref 0–0.5)
LDLC SERPL CALC-MCNC: 77 MG/DL (ref 0–100)
LYMPHOCYTES # BLD AUTO: 2.96 10*3/MM3 (ref 0.7–3.1)
LYMPHOCYTES NFR BLD AUTO: 34.5 % (ref 19.6–45.3)
MCH RBC QN AUTO: 29.7 PG (ref 26.6–33)
MCHC RBC AUTO-ENTMCNC: 33.9 G/DL (ref 31.5–35.7)
MCV RBC AUTO: 87.7 FL (ref 79–97)
MONOCYTES # BLD AUTO: 0.56 10*3/MM3 (ref 0.1–0.9)
MONOCYTES NFR BLD AUTO: 6.5 % (ref 5–12)
NEUTROPHILS # BLD AUTO: 4.83 10*3/MM3 (ref 1.7–7)
NEUTROPHILS NFR BLD AUTO: 56.4 % (ref 42.7–76)
NRBC BLD AUTO-RTO: 0 /100 WBC (ref 0–0.2)
PLATELET # BLD AUTO: 181 10*3/MM3 (ref 140–450)
POTASSIUM SERPL-SCNC: 4.6 MMOL/L (ref 3.5–5.2)
PROT SERPL-MCNC: 6.5 G/DL (ref 6–8.5)
PSA SERPL-MCNC: 1.46 NG/ML (ref 0–4)
RBC # BLD AUTO: 5.55 10*6/MM3 (ref 4.14–5.8)
SODIUM SERPL-SCNC: 138 MMOL/L (ref 136–145)
TRIGL SERPL-MCNC: 373 MG/DL (ref 0–150)
VLDLC SERPL CALC-MCNC: 60 MG/DL (ref 5–40)
WBC # BLD AUTO: 8.57 10*3/MM3 (ref 3.4–10.8)

## 2022-10-21 RX ORDER — LISINOPRIL 10 MG/1
TABLET ORAL
Qty: 90 TABLET | Refills: 1 | Status: SHIPPED | OUTPATIENT
Start: 2022-10-21

## 2023-04-19 RX ORDER — ATORVASTATIN CALCIUM 10 MG/1
TABLET, FILM COATED ORAL
Qty: 90 TABLET | Refills: 1 | Status: SHIPPED | OUTPATIENT
Start: 2023-04-19

## 2023-04-19 RX ORDER — CARVEDILOL 25 MG/1
TABLET ORAL
Qty: 180 TABLET | Refills: 1 | Status: SHIPPED | OUTPATIENT
Start: 2023-04-19

## 2023-04-20 RX ORDER — LISINOPRIL 10 MG/1
TABLET ORAL
Qty: 90 TABLET | Refills: 1 | Status: SHIPPED | OUTPATIENT
Start: 2023-04-20

## 2023-10-16 RX ORDER — CARVEDILOL 25 MG/1
25 TABLET ORAL 2 TIMES DAILY WITH MEALS
Qty: 180 TABLET | Refills: 0 | Status: SHIPPED | OUTPATIENT
Start: 2023-10-16

## 2024-01-18 RX ORDER — CARVEDILOL 25 MG/1
25 TABLET ORAL 2 TIMES DAILY WITH MEALS
Qty: 180 TABLET | Refills: 0 | Status: SHIPPED | OUTPATIENT
Start: 2024-01-18

## 2024-04-08 RX ORDER — LISINOPRIL 10 MG/1
10 TABLET ORAL DAILY
Qty: 30 TABLET | Refills: 0 | Status: SHIPPED | OUTPATIENT
Start: 2024-04-08

## 2024-04-15 RX ORDER — CARVEDILOL 25 MG/1
25 TABLET ORAL 2 TIMES DAILY WITH MEALS
Qty: 15 TABLET | Refills: 0 | Status: SHIPPED | OUTPATIENT
Start: 2024-04-15

## 2024-05-09 RX ORDER — LISINOPRIL 10 MG/1
10 TABLET ORAL DAILY
Qty: 30 TABLET | Refills: 0 | Status: SHIPPED | OUTPATIENT
Start: 2024-05-09

## 2024-05-28 RX ORDER — LISINOPRIL 10 MG/1
10 TABLET ORAL DAILY
Qty: 90 TABLET | Refills: 1 | Status: SHIPPED | OUTPATIENT
Start: 2024-05-28

## 2024-07-13 ENCOUNTER — HOSPITAL ENCOUNTER (INPATIENT)
Facility: HOSPITAL | Age: 59
LOS: 6 days | Discharge: HOME | End: 2024-07-19
Attending: STUDENT IN AN ORGANIZED HEALTH CARE EDUCATION/TRAINING PROGRAM | Admitting: FAMILY MEDICINE
Payer: COMMERCIAL

## 2024-07-13 ENCOUNTER — APPOINTMENT (OUTPATIENT)
Dept: GENERAL RADIOLOGY | Facility: HOSPITAL | Age: 59
End: 2024-07-13
Payer: COMMERCIAL

## 2024-07-13 ENCOUNTER — APPOINTMENT (OUTPATIENT)
Dept: CT IMAGING | Facility: HOSPITAL | Age: 59
End: 2024-07-13
Payer: COMMERCIAL

## 2024-07-13 DIAGNOSIS — R13.10 DYSPHAGIA, UNSPECIFIED TYPE: ICD-10-CM

## 2024-07-13 DIAGNOSIS — R41.82 ALTERED MENTAL STATUS, UNSPECIFIED ALTERED MENTAL STATUS TYPE: Primary | ICD-10-CM

## 2024-07-13 DIAGNOSIS — Z74.09 IMPAIRED MOBILITY: ICD-10-CM

## 2024-07-13 LAB
ALBUMIN SERPL-MCNC: 4.2 G/DL (ref 3.5–5.2)
ALBUMIN/GLOB SERPL: 1.4 G/DL
ALP SERPL-CCNC: 66 U/L (ref 39–117)
ALT SERPL W P-5'-P-CCNC: 31 U/L (ref 1–41)
AMMONIA BLD-SCNC: 18 UMOL/L (ref 16–60)
AMPHET+METHAMPHET UR QL: NEGATIVE
AMPHETAMINES UR QL: NEGATIVE
ANION GAP SERPL CALCULATED.3IONS-SCNC: 11 MMOL/L (ref 5–15)
APPEARANCE CSF: CLEAR
AST SERPL-CCNC: 22 U/L (ref 1–40)
BACTERIA UR QL AUTO: NORMAL /HPF
BARBITURATES UR QL SCN: NEGATIVE
BASOPHILS # BLD AUTO: 0.01 10*3/MM3 (ref 0–0.2)
BASOPHILS NFR BLD AUTO: 0.1 % (ref 0–1.5)
BENZODIAZ UR QL SCN: NEGATIVE
BILIRUB SERPL-MCNC: 0.6 MG/DL (ref 0–1.2)
BILIRUB UR QL STRIP: NEGATIVE
BUN SERPL-MCNC: 14 MG/DL (ref 8–23)
BUN/CREAT SERPL: 13.5 (ref 7–25)
BUPRENORPHINE SERPL-MCNC: NEGATIVE NG/ML
C GATTII+NEOFOR DNA CSF QL NAA+NON-PROBE: NOT DETECTED
CALCIUM SPEC-SCNC: 8.9 MG/DL (ref 8.6–10.5)
CANNABINOIDS SERPL QL: NEGATIVE
CHLORIDE SERPL-SCNC: 98 MMOL/L (ref 98–107)
CLARITY UR: CLEAR
CMV DNA CSF QL NAA+PROBE: NOT DETECTED
CO2 SERPL-SCNC: 26 MMOL/L (ref 22–29)
COCAINE UR QL: NEGATIVE
COLOR CSF: COLORLESS
COLOR SPUN CSF: COLORLESS
COLOR UR: ABNORMAL
CREAT SERPL-MCNC: 1.04 MG/DL (ref 0.76–1.27)
DEPRECATED RDW RBC AUTO: 41.1 FL (ref 37–54)
E COLI K1 DNA CSF QL NAA+NON-PROBE: NOT DETECTED
EGFRCR SERPLBLD CKD-EPI 2021: 82.2 ML/MIN/1.73
EOSINOPHIL # BLD AUTO: 0.04 10*3/MM3 (ref 0–0.4)
EOSINOPHIL NFR BLD AUTO: 0.5 % (ref 0.3–6.2)
ERYTHROCYTE [DISTWIDTH] IN BLOOD BY AUTOMATED COUNT: 13 % (ref 12.3–15.4)
EV RNA CSF QL NAA+PROBE: NOT DETECTED
FENTANYL UR-MCNC: NEGATIVE NG/ML
FLUAV RNA RESP QL NAA+PROBE: NOT DETECTED
FLUBV RNA RESP QL NAA+PROBE: NOT DETECTED
FOLATE SERPL-MCNC: >20 NG/ML (ref 4.78–24.2)
GLOBULIN UR ELPH-MCNC: 3.1 GM/DL
GLUCOSE BLDC GLUCOMTR-MCNC: 109 MG/DL (ref 70–130)
GLUCOSE BLDC GLUCOMTR-MCNC: 131 MG/DL (ref 70–130)
GLUCOSE BLDC GLUCOMTR-MCNC: 139 MG/DL (ref 70–130)
GLUCOSE CSF-MCNC: 79 MG/DL (ref 40–70)
GLUCOSE SERPL-MCNC: 156 MG/DL (ref 65–99)
GLUCOSE UR STRIP-MCNC: NEGATIVE MG/DL
GP B STREP DNA SPEC QL NAA+PROBE: NOT DETECTED
HAEM INFLU SEROTYP DNA SPEC NAA+PROBE: NOT DETECTED
HCT VFR BLD AUTO: 45.5 % (ref 37.5–51)
HGB BLD-MCNC: 15.7 G/DL (ref 13–17.7)
HGB UR QL STRIP.AUTO: NEGATIVE
HHV6 DNA CSF QL NAA+PROBE: NOT DETECTED
HOLD SPECIMEN: NORMAL
HSV1 DNA CSF QL NAA+PROBE: NOT DETECTED
HSV2 DNA CSF QL NAA+PROBE: NOT DETECTED
HYALINE CASTS UR QL AUTO: NORMAL /LPF
IMM GRANULOCYTES # BLD AUTO: 0.04 10*3/MM3 (ref 0–0.05)
IMM GRANULOCYTES NFR BLD AUTO: 0.5 % (ref 0–0.5)
INR PPP: 1 (ref 0.91–1.09)
KETONES UR QL STRIP: ABNORMAL
L MONOCYTOG RRNA SPEC QL PROBE: NOT DETECTED
LEUKOCYTE ESTERASE UR QL STRIP.AUTO: ABNORMAL
LYMPHOCYTES # BLD AUTO: 1.52 10*3/MM3 (ref 0.7–3.1)
LYMPHOCYTES NFR BLD AUTO: 19.9 % (ref 19.6–45.3)
LYMPHOCYTES NFR CSF MANUAL: 87 %
MAGNESIUM SERPL-MCNC: 2.2 MG/DL (ref 1.6–2.4)
MCH RBC QN AUTO: 29.7 PG (ref 26.6–33)
MCHC RBC AUTO-ENTMCNC: 34.5 G/DL (ref 31.5–35.7)
MCV RBC AUTO: 86.2 FL (ref 79–97)
METHADONE UR QL SCN: NEGATIVE
METHOD: ABNORMAL
MONOCYTES # BLD AUTO: 0.36 10*3/MM3 (ref 0.1–0.9)
MONOCYTES NFR BLD AUTO: 4.7 % (ref 5–12)
MONOCYTES NFR CSF MANUAL: 10 % (ref 15–45)
N MEN DNA SPEC QL NAA+PROBE: NOT DETECTED
NEUTROPHILS NFR BLD AUTO: 5.65 10*3/MM3 (ref 1.7–7)
NEUTROPHILS NFR BLD AUTO: 74.3 % (ref 42.7–76)
NEUTROPHILS NFR CSF MICRO: 3 % (ref 0–6)
NITRITE UR QL STRIP: NEGATIVE
NRBC BLD AUTO-RTO: 0 /100 WBC (ref 0–0.2)
NUC CELL # CSF MANUAL: 22 /MM3 (ref 0–5)
OPIATES UR QL: NEGATIVE
OXYCODONE UR QL SCN: NEGATIVE
PARECHOVIRUS A RNA CSF QL NAA+NON-PROBE: NOT DETECTED
PCP UR QL SCN: NEGATIVE
PH UR STRIP.AUTO: 6 [PH] (ref 5–8)
PLATELET # BLD AUTO: 160 10*3/MM3 (ref 140–450)
PMV BLD AUTO: 10.5 FL (ref 6–12)
POTASSIUM SERPL-SCNC: 4.2 MMOL/L (ref 3.5–5.2)
PROT CSF-MCNC: 57.3 MG/DL (ref 15–45)
PROT SERPL-MCNC: 7.3 G/DL (ref 6–8.5)
PROT UR QL STRIP: NEGATIVE
PROTHROMBIN TIME: 13.5 SECONDS (ref 11.8–14.8)
QT INTERVAL: 390 MS
QTC INTERVAL: 458 MS
RBC # BLD AUTO: 5.28 10*6/MM3 (ref 4.14–5.8)
RBC # CSF MANUAL: 67 /MM3 (ref 0–0)
RBC # UR STRIP: NORMAL /HPF
REF LAB TEST METHOD: NORMAL
S PNEUM DNA CSF QL NAA+NON-PROBE: NOT DETECTED
SARS-COV-2 RNA RESP QL NAA+PROBE: NOT DETECTED
SODIUM SERPL-SCNC: 135 MMOL/L (ref 136–145)
SP GR UR STRIP: >1.03 (ref 1–1.03)
SPECIMEN VOL CSF: 14 ML
SQUAMOUS #/AREA URNS HPF: NORMAL /HPF
TRICYCLICS UR QL SCN: NEGATIVE
TSH SERPL DL<=0.05 MIU/L-ACNC: 0.36 UIU/ML (ref 0.27–4.2)
TUBE # CSF: 1
UROBILINOGEN UR QL STRIP: ABNORMAL
VIT B12 BLD-MCNC: 959 PG/ML (ref 211–946)
VZV DNA CSF QL NAA+PROBE: NOT DETECTED
WBC # UR STRIP: NORMAL /HPF
WBC NRBC COR # BLD AUTO: 7.62 10*3/MM3 (ref 3.4–10.8)
WHOLE BLOOD HOLD COAG: NORMAL
WHOLE BLOOD HOLD SPECIMEN: NORMAL

## 2024-07-13 PROCEDURE — 80050 GENERAL HEALTH PANEL: CPT | Performed by: STUDENT IN AN ORGANIZED HEALTH CARE EDUCATION/TRAINING PROGRAM

## 2024-07-13 PROCEDURE — P9612 CATHETERIZE FOR URINE SPEC: HCPCS

## 2024-07-13 PROCEDURE — 82784 ASSAY IGA/IGD/IGG/IGM EACH: CPT | Performed by: PSYCHIATRY & NEUROLOGY

## 2024-07-13 PROCEDURE — 99223 1ST HOSP IP/OBS HIGH 75: CPT | Performed by: PSYCHIATRY & NEUROLOGY

## 2024-07-13 PROCEDURE — 86255 FLUORESCENT ANTIBODY SCREEN: CPT | Performed by: PSYCHIATRY & NEUROLOGY

## 2024-07-13 PROCEDURE — 82042 OTHER SOURCE ALBUMIN QUAN EA: CPT | Performed by: PSYCHIATRY & NEUROLOGY

## 2024-07-13 PROCEDURE — 009U3ZX DRAINAGE OF SPINAL CANAL, PERCUTANEOUS APPROACH, DIAGNOSTIC: ICD-10-PCS | Performed by: PSYCHIATRY & NEUROLOGY

## 2024-07-13 PROCEDURE — 86617 LYME DISEASE ANTIBODY: CPT | Performed by: PSYCHIATRY & NEUROLOGY

## 2024-07-13 PROCEDURE — 80307 DRUG TEST PRSMV CHEM ANLYZR: CPT | Performed by: STUDENT IN AN ORGANIZED HEALTH CARE EDUCATION/TRAINING PROGRAM

## 2024-07-13 PROCEDURE — 84425 ASSAY OF VITAMIN B-1: CPT | Performed by: PSYCHIATRY & NEUROLOGY

## 2024-07-13 PROCEDURE — 83873 ASSAY OF CSF PROTEIN: CPT | Performed by: PSYCHIATRY & NEUROLOGY

## 2024-07-13 PROCEDURE — 70450 CT HEAD/BRAIN W/O DYE: CPT

## 2024-07-13 PROCEDURE — 82945 GLUCOSE OTHER FLUID: CPT | Performed by: PSYCHIATRY & NEUROLOGY

## 2024-07-13 PROCEDURE — 92610 EVALUATE SWALLOWING FUNCTION: CPT

## 2024-07-13 PROCEDURE — 87483 CNS DNA AMP PROBE TYPE 12-25: CPT | Performed by: PSYCHIATRY & NEUROLOGY

## 2024-07-13 PROCEDURE — 82164 ANGIOTENSIN I ENZYME TEST: CPT | Performed by: PSYCHIATRY & NEUROLOGY

## 2024-07-13 PROCEDURE — 86789 WEST NILE VIRUS ANTIBODY: CPT | Performed by: PSYCHIATRY & NEUROLOGY

## 2024-07-13 PROCEDURE — 99285 EMERGENCY DEPT VISIT HI MDM: CPT

## 2024-07-13 PROCEDURE — 93005 ELECTROCARDIOGRAM TRACING: CPT | Performed by: STUDENT IN AN ORGANIZED HEALTH CARE EDUCATION/TRAINING PROGRAM

## 2024-07-13 PROCEDURE — 71045 X-RAY EXAM CHEST 1 VIEW: CPT

## 2024-07-13 PROCEDURE — 70496 CT ANGIOGRAPHY HEAD: CPT

## 2024-07-13 PROCEDURE — 83916 OLIGOCLONAL BANDS: CPT | Performed by: PSYCHIATRY & NEUROLOGY

## 2024-07-13 PROCEDURE — 25010000002 ZIPRASIDONE MESYLATE PER 10 MG: Performed by: INTERNAL MEDICINE

## 2024-07-13 PROCEDURE — 25010000002 ACETAMINOPHEN 10 MG/ML SOLUTION: Performed by: FAMILY MEDICINE

## 2024-07-13 PROCEDURE — 86341 ISLET CELL ANTIBODY: CPT | Performed by: PSYCHIATRY & NEUROLOGY

## 2024-07-13 PROCEDURE — 62270 DX LMBR SPI PNXR: CPT | Performed by: PSYCHIATRY & NEUROLOGY

## 2024-07-13 PROCEDURE — 89051 BODY FLUID CELL COUNT: CPT | Performed by: PSYCHIATRY & NEUROLOGY

## 2024-07-13 PROCEDURE — 82040 ASSAY OF SERUM ALBUMIN: CPT | Performed by: PSYCHIATRY & NEUROLOGY

## 2024-07-13 PROCEDURE — 25510000001 IOPAMIDOL PER 1 ML: Performed by: STUDENT IN AN ORGANIZED HEALTH CARE EDUCATION/TRAINING PROGRAM

## 2024-07-13 PROCEDURE — 36415 COLL VENOUS BLD VENIPUNCTURE: CPT | Performed by: PSYCHIATRY & NEUROLOGY

## 2024-07-13 PROCEDURE — 82140 ASSAY OF AMMONIA: CPT | Performed by: PSYCHIATRY & NEUROLOGY

## 2024-07-13 PROCEDURE — 86788 WEST NILE VIRUS AB IGM: CPT | Performed by: PSYCHIATRY & NEUROLOGY

## 2024-07-13 PROCEDURE — 81001 URINALYSIS AUTO W/SCOPE: CPT | Performed by: STUDENT IN AN ORGANIZED HEALTH CARE EDUCATION/TRAINING PROGRAM

## 2024-07-13 PROCEDURE — 82607 VITAMIN B-12: CPT | Performed by: PSYCHIATRY & NEUROLOGY

## 2024-07-13 PROCEDURE — 87636 SARSCOV2 & INF A&B AMP PRB: CPT | Performed by: STUDENT IN AN ORGANIZED HEALTH CARE EDUCATION/TRAINING PROGRAM

## 2024-07-13 PROCEDURE — 82948 REAGENT STRIP/BLOOD GLUCOSE: CPT

## 2024-07-13 PROCEDURE — 83735 ASSAY OF MAGNESIUM: CPT | Performed by: STUDENT IN AN ORGANIZED HEALTH CARE EDUCATION/TRAINING PROGRAM

## 2024-07-13 PROCEDURE — 82746 ASSAY OF FOLIC ACID SERUM: CPT | Performed by: PSYCHIATRY & NEUROLOGY

## 2024-07-13 PROCEDURE — 84157 ASSAY OF PROTEIN OTHER: CPT | Performed by: PSYCHIATRY & NEUROLOGY

## 2024-07-13 PROCEDURE — 25810000003 LACTATED RINGERS SOLUTION: Performed by: STUDENT IN AN ORGANIZED HEALTH CARE EDUCATION/TRAINING PROGRAM

## 2024-07-13 PROCEDURE — 85610 PROTHROMBIN TIME: CPT | Performed by: NURSE PRACTITIONER

## 2024-07-13 PROCEDURE — 86051 AQUAPORIN-4 ANTB ELISA: CPT | Performed by: PSYCHIATRY & NEUROLOGY

## 2024-07-13 PROCEDURE — 70498 CT ANGIOGRAPHY NECK: CPT

## 2024-07-13 RX ORDER — ACETAMINOPHEN 10 MG/ML
1000 INJECTION, SOLUTION INTRAVENOUS ONCE
Status: COMPLETED | OUTPATIENT
Start: 2024-07-13 | End: 2024-07-13

## 2024-07-13 RX ORDER — SODIUM CHLORIDE 0.9 % (FLUSH) 0.9 %
10 SYRINGE (ML) INJECTION AS NEEDED
Status: DISCONTINUED | OUTPATIENT
Start: 2024-07-13 | End: 2024-07-19 | Stop reason: HOSPADM

## 2024-07-13 RX ORDER — CARVEDILOL 25 MG/1
25 TABLET ORAL 2 TIMES DAILY WITH MEALS
COMMUNITY

## 2024-07-13 RX ORDER — SODIUM CHLORIDE 0.9 % (FLUSH) 0.9 %
10 SYRINGE (ML) INJECTION EVERY 12 HOURS SCHEDULED
Status: DISCONTINUED | OUTPATIENT
Start: 2024-07-13 | End: 2024-07-19 | Stop reason: HOSPADM

## 2024-07-13 RX ORDER — METHYLPREDNISOLONE 4 MG/1
4 TABLET ORAL DAILY
COMMUNITY

## 2024-07-13 RX ORDER — ZIPRASIDONE MESYLATE 20 MG/ML
10 INJECTION, POWDER, LYOPHILIZED, FOR SOLUTION INTRAMUSCULAR EVERY 6 HOURS PRN
Status: DISCONTINUED | OUTPATIENT
Start: 2024-07-13 | End: 2024-07-19 | Stop reason: HOSPADM

## 2024-07-13 RX ORDER — ESOMEPRAZOLE MAGNESIUM 40 MG/1
40 CAPSULE, DELAYED RELEASE ORAL 2 TIMES DAILY WITH MEALS
COMMUNITY
Start: 2024-07-17

## 2024-07-13 RX ORDER — ASPIRIN 300 MG/1
300 SUPPOSITORY RECTAL DAILY
Status: DISCONTINUED | OUTPATIENT
Start: 2024-07-13 | End: 2024-07-19 | Stop reason: HOSPADM

## 2024-07-13 RX ORDER — BROMPHENIRAMINE MALEATE, PSEUDOEPHEDRINE HYDROCHLORIDE, AND DEXTROMETHORPHAN HYDROBROMIDE 2; 30; 10 MG/5ML; MG/5ML; MG/5ML
5 SYRUP ORAL 4 TIMES DAILY PRN
COMMUNITY

## 2024-07-13 RX ORDER — LORAZEPAM 2 MG/ML
1 INJECTION INTRAMUSCULAR ONCE
Status: COMPLETED | OUTPATIENT
Start: 2024-07-14 | End: 2024-07-14

## 2024-07-13 RX ORDER — ATORVASTATIN CALCIUM 40 MG/1
80 TABLET, FILM COATED ORAL NIGHTLY
Status: DISCONTINUED | OUTPATIENT
Start: 2024-07-13 | End: 2024-07-19 | Stop reason: HOSPADM

## 2024-07-13 RX ORDER — LORAZEPAM 2 MG/ML
1 INJECTION INTRAMUSCULAR ONCE
Status: DISCONTINUED | OUTPATIENT
Start: 2024-07-14 | End: 2024-07-13

## 2024-07-13 RX ORDER — SODIUM CHLORIDE 9 MG/ML
40 INJECTION, SOLUTION INTRAVENOUS AS NEEDED
Status: DISCONTINUED | OUTPATIENT
Start: 2024-07-13 | End: 2024-07-19 | Stop reason: HOSPADM

## 2024-07-13 RX ORDER — ASPIRIN 325 MG
325 TABLET ORAL DAILY
Status: DISCONTINUED | OUTPATIENT
Start: 2024-07-13 | End: 2024-07-19 | Stop reason: HOSPADM

## 2024-07-13 RX ORDER — ATORVASTATIN CALCIUM 10 MG/1
10 TABLET, FILM COATED ORAL DAILY
COMMUNITY

## 2024-07-13 RX ADMIN — Medication 10 ML: at 21:07

## 2024-07-13 RX ADMIN — SODIUM CHLORIDE, POTASSIUM CHLORIDE, SODIUM LACTATE AND CALCIUM CHLORIDE 1000 ML: 600; 310; 30; 20 INJECTION, SOLUTION INTRAVENOUS at 11:08

## 2024-07-13 RX ADMIN — ACETAMINOPHEN 1000 MG: 10 INJECTION, SOLUTION INTRAVENOUS at 22:03

## 2024-07-13 RX ADMIN — ZIPRASIDONE MESYLATE 10 MG: 20 INJECTION, POWDER, LYOPHILIZED, FOR SOLUTION INTRAMUSCULAR at 21:03

## 2024-07-13 RX ADMIN — IOPAMIDOL 100 ML: 755 INJECTION, SOLUTION INTRAVENOUS at 10:48

## 2024-07-13 RX ADMIN — ASPIRIN 300 MG: 300 SUPPOSITORY RECTAL at 17:01

## 2024-07-13 NOTE — ED PROVIDER NOTES
"Subjective   History of Present Illness  Patient presents due to altered mental state.  Noticed it this morning.  Was last known normal between 9 and 10 PM last night when his wife spoke with him.  He has had an upper respiratory infection for couple weeks, with nonproductive cough, no shortness of breath.  No falls or head injuries.  She tried to alert him this morning, asking if he wanted to get up for the day, and he responded \"I do not know what you want me to do with it.\"  She denies any slurred speech or facial droop.  He has not been making any sense throughout the morning.  EMS was called, and brings the patient in emergently for arrival.  Point-of-care glucose is stable.  No history of stroke.    Review of Systems   Constitutional:  Negative for chills and fever.   Respiratory:  Positive for cough. Negative for shortness of breath.    Cardiovascular:  Negative for chest pain and palpitations.   Gastrointestinal:  Negative for abdominal pain and vomiting.   Genitourinary:  Negative for difficulty urinating and dysuria.   Neurological:  Negative for syncope and light-headedness.       Past Medical History:   Diagnosis Date    GERD (gastroesophageal reflux disease)     Hyperlipidemia     Hypertension        No Known Allergies    Past Surgical History:   Procedure Laterality Date    HERNIA REPAIR         Family History   Problem Relation Age of Onset    Breast cancer Mother     Bone cancer Mother     Breast cancer Sister        Social History     Socioeconomic History    Marital status:    Tobacco Use    Smoking status: Never    Smokeless tobacco: Never   Vaping Use    Vaping status: Never Used   Substance and Sexual Activity    Alcohol use: No    Drug use: Never           Objective   Physical Exam  Vitals reviewed.   Constitutional:       General: He is not in acute distress.  HENT:      Head: Normocephalic and atraumatic.      Comments: No focal intraoral swelling.  No uvular deviation.  No posterior " "pharyngeal erythema or exudate.  No tonsillar exudate or focal tonsillar swelling.  Intraoral exam overall unremarkable.     Right Ear: Tympanic membrane normal.      Left Ear: Tympanic membrane normal.   Eyes:      Extraocular Movements: Extraocular movements intact.      Conjunctiva/sclera: Conjunctivae normal.   Cardiovascular:      Pulses: Normal pulses.      Heart sounds: Normal heart sounds.   Pulmonary:      Effort: Pulmonary effort is normal. No respiratory distress.      Breath sounds: Normal breath sounds. No wheezing.   Abdominal:      General: Abdomen is flat. There is no distension.      Tenderness: There is no abdominal tenderness. There is no guarding.   Musculoskeletal:      Cervical back: Normal range of motion and neck supple.   Skin:     General: Skin is warm and dry.   Neurological:      General: No focal deficit present.      Mental Status: Mental status is at baseline.      Comments: Appropriately interactive; not keenly engaging with exam, but not somnolent or obtunded. Follows commands to voice.  Blinks eyes, squeezes hands.  Does not answer month and age; states 8 when asked about his age which is his birth month.  No diminished sensation throughout the extremities.  No drift in any extremity.  Does not understand coordination testing with either arm or either leg.  Does exhibit normal coordination with crossing his legs in bed and trying to follow other commands.  Answers visual field deficit questions properly, no visual field deficit.  Pupils are equal round and reactive.  Extraocular movements are full and normal.  Answers questions with fluent speech, but confused; for example, states \"well I don't know\" when asked about any symptoms. Does not complete sentences. Denies pain.     NIHSS: LOC - +1  Month and age - +2  Language - +2 (has limitation to speech due to his aphasia and/or confusion)  Inattention - +1    No lateralizing signs.   Psychiatric:         Behavior: Behavior normal.   " "      Thought Content: Thought content normal.         Procedures           ED Course  ED Course as of 07/13/24 1516   Sat Jul 13, 2024   1031 Pt exhibits more of a generalized encephelopathy but does have a NIHSS 6 so vessel imaging would be reasonable although he has no lateralizing signs I will d/w Dr. Johnson [AS]   1038 I discussed with Dr. Johnson who recommends no code stroke at this time given that altered mental status due to encephalopathic process is more favored.  Will obtain vessel imaging, and patient may need MRI for stroke, but in the absence of an LVO there is no beneficial emergent management that could be performed of stroke. [AS]   1200 Pt is same on re-eval. No change. No LVO on CTA. Will d/w Dr. Johnson about proceeding with MRI and plan to admit. No meningismus or fever to suggest meningitis. [AS]   1200 Pt still with nonsensical speech no dysarthria and mental status is similar. He is able to state his birth date. He states \"Well, other side\" when asked location. [AS]   1250 Spoken with Dr. Johnson.  Shared the update on the patient, negative workup so far.  He does not recommend any stat MRI. [AS]   1250 Hemoglobin: 15.7  Will admit to the hospitalist for further workup, family is updated. [AS]   1257 NIHSS 6 was d/w Dr. Johnson on arrival [AS]      ED Course User Index  [AS] Jorge Live MD                          Total (NIH Stroke Scale): 4                  Medical Decision Making  Problems Addressed:  Altered mental status, unspecified altered mental status type: complicated acute illness or injury    Amount and/or Complexity of Data Reviewed  Labs: ordered. Decision-making details documented in ED Course.  Radiology: ordered.  ECG/medicine tests: ordered.    Risk  Prescription drug management.  Decision regarding hospitalization.      Ty Webb is a 58 y.o. male with PMH above who presents to the Emergency Department with AMS.  Exhibits a nonspecific encephalopathic sort " of picture with an elevated night stroke scale as outlined above.  See ED course regarding my discussion with Dr. Johnson about whether or not to activate a code stroke in this patient.  Given that he meets in the night stroke scale of 6 I will obtain a CTA to evaluate for any LVO so that if he has any reversible process or emergency management that could be performed in the event that he has a stroke and this can be done given that I have a lower suspicion for stroke but it is not ruled out at this time.  Given his recent history of upper respiratory infection, I have considered a deep space infection but his neck exam is supple with no tenderness, ear exam is normal, no intraoral findings, no fevers.  Wife denies any history of fevers.  No tenderness in the abdomen no chest pain or trouble breathing per history his lung sounds are normal he does not have peripheral edema.  Not clinically septic.  Overall unclear etiology; infectious workup is obtained.  Will obtain electrolyte studies, check for renal failure or other possible etiology of altered mental status.  He also received a medication yesterday via shot at urgent care, it is unclear what this medication was, unclear if he could be having a reaction to this.  No allergic reaction symptoms.    ED Course:   -See ED course.      Final diagnosis: AMS    All questions answered. Patient/family was understanding and in agreement with today's assessment and plan. The patient was monitored during their stay in the ED and dispositioned without acute event.    Electronically signed by:  Jorge Live MD 7/13/2024 15:16 CDT      Note: Dragon medical dictation software was used in the creation of this note.        Final diagnoses:   Altered mental status, unspecified altered mental status type       ED Disposition  ED Disposition       ED Disposition   Decision to Admit    Condition   --    Comment   Level of Care: Remote Telemetry [26]   Diagnosis: Altered mental  status [780.97.ICD-9-CM]   Admitting Physician: MARCE CAM [[id number]]   Attending Physician: MARCE CAM [[id number]]   Certification: I Certify That Inpatient Hospital Services Are Medically Necessary For Greater Than 2 Midnights                 No follow-up provider specified.       Medication List      No changes were made to your prescriptions during this visit.            Jorge Live MD  07/13/24 9019       Jorge Live MD  07/13/24 5317

## 2024-07-13 NOTE — PLAN OF CARE
Goal Outcome Evaluation:      Pt arrived from ER at around 1515. He does make nonsensical speech. He was able to say his birthday and a few other things. His NIH is a 4. He is afebrile and VSS. He doesn't complain of pain. He failed his swallow eval, speech came by but pt did seem to participate. He is slow to follow commands, but can follow commands. He is on room air and NPO. CT and CTAs were negative. Awaiting lumbar puncture labs.

## 2024-07-13 NOTE — THERAPY EVALUATION
Acute Care - Speech Language Pathology   Swallow Initial Evaluation [facility]     Patient Name: Ty Webb  : 1965  MRN: [id number]  Today's Date: 2024               Admit Date: 2024  SLP swallow evaluation complete. The pt is noted to being lying in bed with his son present at bedside at time of SLP arrival. The pt is not to be awake; however, he does not verbalize or follow any commands. Pt would not initiate a volitional cough or swallow and would not attempt to obtain bolus from spoon even following cold bolus stimulation. SLP cannot ensure safety with PO intake at this time and pt should remain NPO at this time. Meds to be administered via alternate route. SLP will continue to follow and treat.    Pee Michaud, CCC-SLP 2024 17:00 CDT    Visit Dx:     ICD-10-CM ICD-9-CM   1. Altered mental status, unspecified altered mental status type  R41.82 780.97   2. Dysphagia, unspecified type  R13.10 787.20     Patient Active Problem List   Diagnosis    Essential hypertension    Gastroesophageal reflux disease without esophagitis    Mixed hyperlipidemia    Cough    Influenza B    Perineal pain    Altered mental status     Past Medical History:   Diagnosis Date    GERD (gastroesophageal reflux disease)     Hyperlipidemia     Hypertension      Past Surgical History:   Procedure Laterality Date    HERNIA REPAIR         SLP Recommendation and Plan  SLP Swallowing Diagnosis: severe, oral dysphagia, suspected pharyngeal dysphagia, R/O pharyngeal dysphagia (24)  SLP Diet Recommendation: NPO (24)     SLP Rec. for Method of Medication Administration: meds via alternate route (24)     Monitor for Signs of Aspiration: yes, notify SLP if any concerns (24)     Swallow Criteria for Skilled Therapeutic Interventions Met: demonstrates skilled criteria (24)  Anticipated Discharge Disposition (SLP): unknown (24)  Rehab Potential/Prognosis,  Swallowing: adequate, monitor progress closely (07/13/24 1425)  Therapy Frequency (Swallow): at least, 3 days per week (07/13/24 1425)  Predicted Duration Therapy Intervention (Days): until discharge (07/13/24 1425)  Oral Care Recommendations: Oral Care BID/PRN (07/13/24 1425)                                        Plan of Care Reviewed With: patient, son  Progress: no change      SWALLOW EVALUATION (Last 72 Hours)       SLP Adult Swallow Evaluation       Row Name 07/13/24 1425                   Rehab Evaluation    Document Type evaluation  -CS        Subjective Information no complaints  -CS        Patient Observations alert;poorly cooperative  -CS        Patient/Family/Caregiver Comments/Observations Son present  -CS        Patient Effort poor  -CS           General Information    Patient Profile Reviewed yes  -CS        Pertinent History Of Current Problem AMS, CT of head- No acute intracranial abnormality. Lumbar puncture complete.  -CS        Current Method of Nutrition NPO  -CS        Precautions/Limitations, Vision WFL;for purposes of eval  -CS        Precautions/Limitations, Hearing WFL;for purposes of eval  -CS        Prior Level of Function-Communication WFL  -CS        Prior Level of Function-Swallowing no diet consistency restrictions  -CS        Plans/Goals Discussed with patient and family  -CS        Barriers to Rehab cognitive status  -CS        Patient's Goals for Discharge patient did not state  -CS        Family Goals for Discharge family did not state  -CS           Pain    Additional Documentation Pain Scale: FACES Pre/Post-Treatment (Group)  -CS           Pain Scale: FACES Pre/Post-Treatment    Pain: FACES Scale, Pretreatment 0-->no hurt  -CS        Posttreatment Pain Rating 0-->no hurt  -CS           Oral Motor Structure and Function    Dentition Assessment natural, present and adequate  -CS        Secretion Management WNL/WFL  -CS        Mucosal Quality other (see comments)  CNA  -CS         Volitional Swallow unable to elicit  -CS        Volitional Cough unable to elicit  -CS           Oral Musculature and Cranial Nerve Assessment    Oral Motor General Assessment unable to assess  -CS           General Eating/Swallowing Observations    Respiratory Support Currently in Use room air  -CS           Clinical Swallow Eval    Clinical Swallow Evaluation Summary See note  -CS           SLP Evaluation Clinical Impression    SLP Swallowing Diagnosis severe;oral dysphagia;suspected pharyngeal dysphagia;R/O pharyngeal dysphagia  -CS        Functional Impact risk of aspiration/pneumonia;risk of malnutrition  -CS        Rehab Potential/Prognosis, Swallowing adequate, monitor progress closely  -CS        Swallow Criteria for Skilled Therapeutic Interventions Met demonstrates skilled criteria  -CS           Recommendations    Therapy Frequency (Swallow) at least;3 days per week  -CS        Predicted Duration Therapy Intervention (Days) until discharge  -CS        SLP Diet Recommendation NPO  -CS        Oral Care Recommendations Oral Care BID/PRN  -CS        SLP Rec. for Method of Medication Administration meds via alternate route  -CS        Monitor for Signs of Aspiration yes;notify SLP if any concerns  -CS        Anticipated Discharge Disposition (SLP) unknown  -CS           Swallow Goals (SLP)    Swallow LTGs Swallow Long Term Goal (free text)  -CS        Swallow STGs diet tolerance goal selection (SLP)  -CS        Diet Tolerance Goal Selection (SLP) Patient will tolerate trials of  -CS           (LTG) Swallow    (LTG) Swallow Pt will tolerate LRD w/o any overt s/s of aspiration.  -CS        [place] (Swallow Long Term Goal) with minimal cues (75-90% accuracy)  -CS        Time Frame (Swallow Long Term Goal) by discharge  -CS        Barriers (Swallow Long Term Goal) cog status  -CS        Progress/Outcomes (Swallow Long Term Goal) new goal  -CS           (STG) Patient will tolerate trials of    Consistencies  Trialed (Tolerate trials) regular textures;pureed textures;thin liquids;nectar/ mildly thick liquids;honey/ moderately thick liquids  -CS        Desired Outcome (Tolerate trials) without signs/symptoms of aspiration;without signs of distress  -CS        [place] (Tolerate trials) with minimal cues (75-90% accuracy)  -CS        Time Frame (Tolerate trials) by discharge  -CS        Progress/Outcomes (Tolerate trials) new goal  -CS                  User Key  (r) = Recorded By, (t) = Taken By, (c) = Cosigned By      Initials Name Effective Dates    CS Pee Michaud CCC-SLP 05/07/24 -                     EDUCATION  The patient has been educated in the following areas:   Dysphagia (Swallowing Impairment).        SLP GOALS       Row Name 07/13/24 1425             (LTG) Swallow    (LTG) Swallow Pt will tolerate LRD w/o any overt s/s of aspiration.  -CS      [place] (Swallow Long Term Goal) with minimal cues (75-90% accuracy)  -CS      Time Frame (Swallow Long Term Goal) by discharge  -CS      Barriers (Swallow Long Term Goal) cog status  -CS      Progress/Outcomes (Swallow Long Term Goal) new goal  -CS         (STG) Patient will tolerate trials of    Consistencies Trialed (Tolerate trials) regular textures;pureed textures;thin liquids;nectar/ mildly thick liquids;honey/ moderately thick liquids  -CS      Desired Outcome (Tolerate trials) without signs/symptoms of aspiration;without signs of distress  -CS      [place] (Tolerate trials) with minimal cues (75-90% accuracy)  -CS      Time Frame (Tolerate trials) by discharge  -CS      Progress/Outcomes (Tolerate trials) new goal  -CS                User Key  (r) = Recorded By, (t) = Taken By, (c) = Cosigned By      Initials Name Provider Type    Pee Daigle CCC-SLP Speech and Language Pathologist                         Time Calculation:    Time Calculation- SLP       Row Name 07/13/24 1657             Time Calculation- SLP    SLP Start Time 1425  -       SLP Stop Time 1520  -CS      SLP Time Calculation (min) 55 min  -CS      SLP Received On 07/13/24  -CS      SLP Goal Re-Cert Due Date 07/23/24  -CS         Untimed Charges    [id number] Eval Oral Pharyng Swallow Minutes 55  -CS         Total Minutes    Untimed Charges Total Minutes 55  -CS       Total Minutes 55  -CS                User Key  (r) = Recorded By, (t) = Taken By, (c) = Cosigned By      Initials Name Provider Type    CS Pee Michaud, RIGO-SLP Speech and Language Pathologist                    Therapy Charges for Today       Code Description Service Date Service Provider Modifiers Qty    [id number]  ST EVAL ORAL PHARYNG SWALLOW 4 7/13/2024 Pee Michaud CCC-SLP GN 1                 KATHERINE Vyas  7/13/2024

## 2024-07-13 NOTE — PROCEDURES
Procedure: Lumbar puncture  Indication for procedure: Considering encephalitis    Procedure description: Informed consent was obtained.  A timeout was performed.  The patient was laid on his right side.  He was draped in a sterile fashion.  The L3-L4 intervertebral disc base was identified using palpating techniques.  ChloraPrep was used as a sterilizing agent.  1% lidocaine was utilized as an anesthetic agent around the local region.  A standard lumbar puncture needle was inserted.  Approximately 16 cc of clear CSF was obtained.  There was a very slow drip and therefore the manometer was not used determine opening or closing pressure.  Standard lumbar puncture needle was then removed.  No blood was seen.  Pressure was held for approximately 1 minute.  Then a Band-Aid was utilized to cover the insertion point.  The patient was instructed to lay flat for approximately 30 minutes.  There were no complications and no blood loss.    Electronically signed by Bin Johnson MD, 07/13/24, 2:56 PM CDT.

## 2024-07-13 NOTE — CONSULTS
Neurology Consult Note    Consult Date: 2024  Referring MD: No ref. provider found  Reason for Consult: [facility]    Patient: Ty Webb (58 y.o. male)  MRN: [id number]  : 1965    History of Present Illness:   Ty Webb is a 58 y.o. male who is joined today by his wife and son.  They are excellent historians.  He has no significant past medical history.  She tells me that for the past 2 weeks he is felt somewhat poor and has had an atypical cough.  It is to the point where he actually called him from work.  He did visit an urgent care and they prescribed his some steroids and antibiotic but he has not taken any of those.  This morning his wife went to wake him up and he was unable to answer questions appropriately.  It does not sound as if he had an aphasia.  He instead sounded very confused per her words.  He had no facial droop.  He had no neck stiffness.  He had no fevers or chills.  He had no photophobia.  She denied him complaining of a headache.  He has no recent tick bites that she is aware of.  He does not use any substances.      Medical History:   Past Medical/Surgical Hx:  Past Medical History:   Diagnosis Date    GERD (gastroesophageal reflux disease)     Hyperlipidemia     Hypertension      Past Surgical History:   Procedure Laterality Date    HERNIA REPAIR         Medications On Admission:  (Not in a hospital admission)      Current Medications:  No current facility-administered medications for this encounter.    Current Outpatient Medications:     albuterol sulfate  (90 Base) MCG/ACT inhaler, Inhale 2 puffs., Disp: , Rfl:     atorvastatin (LIPITOR) 10 MG tablet, TAKE 1 TABLET BY MOUTH EVERY DAY, Disp: 90 tablet, Rfl: 1    carvedilol (COREG) 25 MG tablet, TAKE 1 TABLET BY MOUTH 2 (TWO) TIMES A DAY WITH MEALS. PLEASE CONTACT OFFICE FOR AN APPOINTMENT FOR FURTHER REFILLS, Disp: 15 tablet, Rfl: 0    citalopram (CeleXA) 20 MG tablet, Take 20 mg by mouth., Disp: , Rfl:     esomeprazole  "(nexIUM) 40 MG capsule, TAKE 1 CAPSULE DAILY       *PERRIGO*, Disp: 90 capsule, Rfl: 1    ketorolac (TORADOL) 10 MG tablet, Take 1 tablet by mouth Every 6 (Six) Hours As Needed for Moderate Pain ., Disp: 20 tablet, Rfl: 0    lisinopril (PRINIVIL,ZESTRIL) 10 MG tablet, Take 1 tablet by mouth Daily., Disp: 90 tablet, Rfl: 1     Allergies:  No Known Allergies    Social Hx:  Social History     Socioeconomic History    Marital status:    Tobacco Use    Smoking status: Never    Smokeless tobacco: Never   Vaping Use    Vaping status: Never Used   Substance and Sexual Activity    Alcohol use: No    Drug use: Never       Family Hx:  Family History   Problem Relation Age of Onset    Breast cancer Mother     Bone cancer Mother     Breast cancer Sister      Physical Examination:   Vital Signs:  Vitals:    07/13/24 1015 07/13/24 1027 07/13/24 1331 07/13/24 1431   BP: 151/100  155/93 139/93   Pulse: 86  88 55   Resp: 15  18 18   Temp:  97.6 °F (36.4 °C)     SpO2: 93%  99% 98%   Weight:  122 kg (270 lb)     Height:  203.2 cm (80\")         General Exam:  Head:  Normocephalic, atraumatic  HEENT:  Neck supple.  No evidence of neck stiffness even with neck flexion.  No evidence of photophobia.  Fundoscopic Exam:  No signs of disc edema  CVS:  Regular rate and rhythm.  No murmurs  Carotid Examination:  No bruits  Lungs:  Clear to auscultation  Abdomen:  Nontender, Nondistended  Extremities:  No signs of peripheral edema  Skin:  No rashes    Neurologic Exam:    Mental Status:    Awake.  Rarely follows commands.  Almost no verbal output.  Does not name objects.  Has a blank look to his face.    CN II:  Visual fields full.  Pupils equally reactive to light  CN III, IV, VI:  Extraocular Muscles full with no signs of nystagmus  CN V:  Facial sensory is symmetric with no asymmetries.  CN VII:  Facial motor symmetric  CN VIII:  Gross hearing intact bilaterally  CN IX:  Palate elevates symmetrically  CN X:  Palate elevates " "symmetrically  CN XI:  Shoulder shrug symmetric  CN XII:  Tongue is midline on protrusion    Motor: (strength out of 5:  1= minimal movement, 2 = movement in plane of gravity, 3 = movement against gravity, 4 = movement against some resistance, 5 = full strength)    -Right Upper Ext: Proximal: 5 Distal: 5  -Left Upper Ext: Proximal: 5 Distal: 5    -Right Lower Ext: Proximal: 5 Distal: 5  -Left Lower Ext: Proximal: 5 Distal: 5    DTR:  -Right   Biceps: 2+ Triceps: 2+ Brachioradialis: 2+   Patella: 2+ Ankle: 2+ Neg Babinski  -Left   Biceps: 2+ Triceps: 2+ Brachioradialis: 2+   Patella: 2+ Ankle: 2+ Neg Babinski    Sensory:  -Intact to light touch, pinprick, temperature, pain, and proprioception    Coordination:  -Finger to nose intact  -Heel to shin intact  -No ataxia    Gait  -No signs of ataxia  -ambulates unassisted    Recent Diagnostics:   Laboratory Results:   - Reviewed in EMR  Lab Results   Component Value Date    GLUCOSE 156 (H) 07/13/2024    CALCIUM 8.9 07/13/2024     (L) 07/13/2024    K 4.2 07/13/2024    CO2 26.0 07/13/2024    CL 98 07/13/2024    BUN 14 07/13/2024    CREATININE 1.04 07/13/2024    EGFRIFAFRI 78 10/07/2021    EGFRIFNONA 65 10/07/2021    BCR 13.5 07/13/2024    ANIONGAP 11.0 07/13/2024     Lab Results   Component Value Date    WBC 7.62 07/13/2024    HGB 15.7 07/13/2024    HCT 45.5 07/13/2024    MCV 86.2 07/13/2024     07/13/2024     Lab Results   Component Value Date    INR 1.00 07/13/2024     Lab Results   Component Value Date    TRIG 373 (H) 10/17/2022    HDL 27 (L) 10/17/2022    LDL 77 10/17/2022     No results found for: \"HGBA1C\"    Imaging Results:  Imaging Results (Last 24 Hours)       Procedure Component Value Units Date/Time    CT Angiogram Head w AI Analysis of LVO [[id number]] Collected: 07/13/24 1109     Updated: 07/13/24 1115    Narrative:      EXAM: CT ANGIOGRAM HEAD W AI ANALYSIS OF LVO-, CT ANGIOGRAM NECK- -  7/13/2024 9:40 AM     HISTORY: workup for stroke; altered, " not a code stroke       COMPARISON: None available.     DOSE LENGTH PRODUCT: 1577.03 mGy.cm (accession [id number]). Automated  exposure control was also utilized to decrease patient radiation dose.     TECHNIQUE: CTA head and neck performed with intravenous contrast. 3D  postprocessing, including MIPs, performed and images saved to PACS. AI  ANALYSIS OF LVO WAS UTILIZED.  Grading of carotid stenosis performed  with NASCET criteria.     FINDINGS:     CTA HEAD: Distal ICAs are patent and without flow-limiting stenosis. No  intracranial large vessel occlusion. Anterior and middle cerebral  arteries are patent and without flow-limiting stenosis. Intracranial  vertebral arteries and basilar artery are normal in caliber. Posterior  cerebral arteries and poterior circulation are patent and normal in  caliber. No visualized aneurysm or vascular malformation. The dural  venous sinuses are unremarkable with no filling defect.      CTA NECK: Great vessels originate normally from the aortic arch.. Common  carotid arteries are patent and without flow-limiting stenosis. The  bilateral internal carotid arteries are patent without stenosis or  occlusion. The distal right internal carotid artery is torturous. The  vertebral arteries are opacified without significant ostial narrowing or  stenosis. The right vertebral artery is dominant. No evidence of  vertebral artery dissection or pseudoaneurysm.     The submitted soft tissues of the neck are unremarkable..  Lung apices are clear. There is mucosal thickening of the frontal  sinuses, ethmoid air cells, sphenoid sinus, and bilateral maxillary  sinuses. The mastoid air cells are clear.       Impression:         1. No arterial occlusion or flow-limiting stenosis in the neck.  2. No intracranial large vessel occlusion or flow-limiting stenosis.     This report was signed and finalized on 7/13/2024 11:12 AM by Glen Ace.       CT Angiogram Neck [[id number]] Collected: 07/13/24  1109     Updated: 07/13/24 1115    Narrative:      EXAM: CT ANGIOGRAM HEAD W AI ANALYSIS OF LVO-, CT ANGIOGRAM NECK- -  7/13/2024 9:40 AM     HISTORY: workup for stroke; altered, not a code stroke       COMPARISON: None available.     DOSE LENGTH PRODUCT: 1577.03 mGy.cm (accession [id number]). Automated  exposure control was also utilized to decrease patient radiation dose.     TECHNIQUE: CTA head and neck performed with intravenous contrast. 3D  postprocessing, including MIPs, performed and images saved to PACS. AI  ANALYSIS OF LVO WAS UTILIZED.  Grading of carotid stenosis performed  with NASCET criteria.     FINDINGS:     CTA HEAD: Distal ICAs are patent and without flow-limiting stenosis. No  intracranial large vessel occlusion. Anterior and middle cerebral  arteries are patent and without flow-limiting stenosis. Intracranial  vertebral arteries and basilar artery are normal in caliber. Posterior  cerebral arteries and poterior circulation are patent and normal in  caliber. No visualized aneurysm or vascular malformation. The dural  venous sinuses are unremarkable with no filling defect.      CTA NECK: Great vessels originate normally from the aortic arch.. Common  carotid arteries are patent and without flow-limiting stenosis. The  bilateral internal carotid arteries are patent without stenosis or  occlusion. The distal right internal carotid artery is torturous. The  vertebral arteries are opacified without significant ostial narrowing or  stenosis. The right vertebral artery is dominant. No evidence of  vertebral artery dissection or pseudoaneurysm.     The submitted soft tissues of the neck are unremarkable..  Lung apices are clear. There is mucosal thickening of the frontal  sinuses, ethmoid air cells, sphenoid sinus, and bilateral maxillary  sinuses. The mastoid air cells are clear.       Impression:         1. No arterial occlusion or flow-limiting stenosis in the neck.  2. No intracranial large vessel  occlusion or flow-limiting stenosis.     This report was signed and finalized on 7/13/2024 11:12 AM by Glen Ace.       CT Head Without Contrast [[id number]] Collected: 07/13/24 1106     Updated: 07/13/24 1112    Narrative:      EXAM: CT HEAD WO CONTRAST-      DATE: 7/13/2024 9:40 AM     HISTORY: eval for stroke       COMPARISON: None available.     DOSE LENGTH PRODUCT: 1577.03 mGy.cm  Automated exposure control was also  utilized to decrease patient radiation dose.     TECHNIQUE: Unenhanced CT images obtained from vertex to skull base with  multiplanar reformats.     FINDINGS:  There is no acute intracranial hemorrhage, midline shift, mass effect,  or hydrocephalus. There is no CT evidence for acute infarct.        There is mucosal thickening at the frontal sinuses, ethmoid air cells,  and sphenoid sinus. The mastoid air cells are clear. Scalp soft tissues  are unremarkable. Bilateral orbits and globes are unremarkable.       Impression:         1. No acute intracranial findings.   2. Paranasal sinus mucosal thickening.     This report was signed and finalized on 7/13/2024 11:08 AM by Glen Ace.       XR Chest 1 View [[id number]] Collected: 07/13/24 1105     Updated: 07/13/24 1109    Narrative:      EXAM: XR CHEST 1 VW-      DATE: 7/13/2024 9:39 AM     HISTORY: AMS recent sinusitis       COMPARISON: None available.     TECHNIQUE:  Frontal view(s) of the chest submitted.     FINDINGS:    There are low lung volumes but the lungs are grossly clear. Heart and  mediastinum are unremarkable. No effusion or pneumothorax is seen.          Impression:         1. No active disease in the chest.     This report was signed and finalized on 7/13/2024 11:05 AM by Glen Ace.                Other labs:  Thus far workup is unremarkable  Other RAD:  Uninfused head CT without contrast showed no acute findings.  (Reviewed by me)      Assessment & Plan:   Patient is a rather healthy 58-year-old male with what  sounds to be an upper respiratory tract infection the last 2 weeks and now presents with acute confusion with no other focal neurologic deficits.  The lack of photophobia, neck stiffness, fevers, chills, and leukocytosis would suggest against a bacterial etiology.  This could represent a viral encephalitis although I think that is much less likely.  I do have concerns this could represent an autoimmune encephalitis.  I am recommending admission.  I did a stat lumbar puncture in the emergency department.  We gave 1 days of acyclovir; however, if there is no evidence of significant white cells in the CSF then no further doses will be required.  I have such a low suspicion for bacterial meningitis or encephalitis that I do not believe he warrants prophylactic antibiotics as of yet.  I am sending meningitis/encephalitis panel and have also sent an autoimmune panel including NMDA receptor antibodies.  The neck step will be to do an MRI with without contrast to see if there is any evidence of FLAIR signal abnormalities or enhancement.    Impression:  Acute confusion  Currently considering the possibility of an autoimmune encephalitis  Will rule out a stroke causing an aphasia that could look similar to confusion  Low suspicion for a seizure  Extremely low suspicion for bacterial meningitis or encephalitis.      Plan:   Lumbar puncture with standard labs but also including a meningitis/encephalitis panel.  NMDA receptor antibodies and an autoimmune encephalitis panel was sent as well.  Lyme's disease is sent too.    Ammonia level  TSH  Folate  B12  B1 level  MRI brain with and without contrast  May consider tick panel as workup continues.    Bin Johnson MD  07/13/24  14:47 CDT    Medical Decision Making    Number/Complexity of Problems  Moderate  1 undiagnosed new problem with uncertain prognosis -   1 acute illness with systemic symptoms -   High  1 acute or chronic illness that poses a threat to life/body function  -   High     MDM Data  Moderate - 1/3 categories  Extensive - 2/3 categories    Category 1: 3 of the following  Review of external notes  Review of results  Ordering of each unique test  Independent historian  Category 2:  Independent interpretation of test (ex: imaging)  Category 3:  Discussion of management with another provider    Extensive     Treatment Plan  Moderate - Prescription Drug management  High  Drug therapy requiring intensive monitoring for toxicity  Decision regarding hospitalization or escalation of care  De-escalate care/DNR decisions  High

## 2024-07-13 NOTE — PLAN OF CARE
Goal Outcome Evaluation:  Plan of Care Reviewed With: patient, son        Progress: no change         Anticipated Discharge Disposition (SLP): unknown          SLP Swallowing Diagnosis: severe, oral dysphagia, suspected pharyngeal dysphagia, R/O pharyngeal dysphagia (07/13/24 0213)           SLP swallow evaluation complete. The pt is noted to being lying in bed with his son present at bedside at time of SLP arrival. The pt is not to be awake; however, he does not verbalize or follow any commands. Pt would not initiate a volitional cough or swallow and would not attempt to obtain bolus from spoon even following cold bolus stimulation. SLP cannot ensure safety with PO intake at this time and pt should remain NPO at this time. Meds to be administered via alternate route. SLP will continue to follow and treat.

## 2024-07-13 NOTE — H&P
[facility] Medicine Services  HISTORY AND PHYSICAL    Date of Admission: 7/13/2024  Primary Care Physician: Irineo Cunningham APRN    Subjective   Primary Historian: Patient's wife    Chief Complaint: Altered mental status    History of Present Illness  This is a 58-year-old gentleman with past medical history significant for hypertension, hyperlipidemia and GERD, who presented to the emergency department earlier today with altered mental status that started earlier this morning.  The last known normal time was around 9 PM last night when the wife was able to talk to him and he answered correctly.  Patient presented to the ER with increased confusion initially was able to recognize his name and his birthday but later he became more forgetful and aphasic there was no slurred speech there was no facial droop there was no loss of consciousness or urinary or fecal incontinence no fever no chills of note patient had an upper respiratory tract infection approximately about 2 weeks ago with nonproductive cough.  There was no history of sick contacts.  Patient was brought into the emergency department where he was subsequently admitted imaging studies were obtained neurology was called, patient had lumbar puncture done at the emergency department.  He will be admitted for further evaluation and management.        Review of Systems   Otherwise complete ROS reviewed and negative except as mentioned in the HPI.    Past Medical History:   Past Medical History:   Diagnosis Date    GERD (gastroesophageal reflux disease)     Hyperlipidemia     Hypertension      Past Surgical History:  Past Surgical History:   Procedure Laterality Date    HERNIA REPAIR       Social History:  reports that he has never smoked. He has never used smokeless tobacco. He reports that he does not drink alcohol and does not use drugs.    Family History: family history includes Bone cancer in his mother; Breast cancer in  "his mother and sister.       Allergies:  No Known Allergies    Medications:  Prior to Admission medications    Medication Sig Start Date End Date Taking? Authorizing Provider   albuterol sulfate  (90 Base) MCG/ACT inhaler Inhale 2 puffs. 11/28/20   Catina Azevedo MD   atorvastatin (LIPITOR) 10 MG tablet TAKE 1 TABLET BY MOUTH EVERY DAY 4/19/23   Meng Augustin MD   carvedilol (COREG) 25 MG tablet TAKE 1 TABLET BY MOUTH 2 (TWO) TIMES A DAY WITH MEALS. PLEASE CONTACT OFFICE FOR AN APPOINTMENT FOR FURTHER REFILLS 4/15/24   Meng Augustin MD   citalopram (CeleXA) 20 MG tablet Take 20 mg by mouth. 11/28/20   Catina Azevedo MD   esomeprazole (nexIUM) 40 MG capsule TAKE 1 CAPSULE DAILY       *PERRIGO* 10/29/19   Meng Augustin MD   ketorolac (TORADOL) 10 MG tablet Take 1 tablet by mouth Every 6 (Six) Hours As Needed for Moderate Pain . 2/14/22   Meng Augustin MD   lisinopril (PRINIVIL,ZESTRIL) 10 MG tablet Take 1 tablet by mouth Daily. 5/28/24   Meng Augustin MD     I have utilized all available immediate resources to obtain, update, or review the patient's current medications (including all prescriptions, over-the-counter products, herbals, cannabis/cannabidiol products, and vitamin/mineral/dietary (nutritional) supplements).    Objective     Vital Signs: /93   Pulse 55   Temp 97.6 °F (36.4 °C)   Resp 18   Ht 203.2 cm (80\")   Wt 122 kg (270 lb)   SpO2 98%   BMI 29.66 kg/m²   Physical Exam   General: Patient is alert and awake, aphasic, does not follow commands.  HEENT: Normocephalic, atraumatic, pupils are equal reactive to light and accommodate.  Neck: Supple, no JVD, no carotid bruits  CVS: S1, S2, regular rhythm and rate  Lungs: Clear to auscultation bilaterally  Abdomen: Soft, nontender, nondistended bowel sounds are present  Extremities: No cyanosis, no clubbing, no edema pulses are intact  Neurologic: Patient is nonverbal.  Does not follow " commands.  Appears to be confused.      Results Reviewed:  Lab Results (last 24 hours)       Procedure Component Value Units Date/Time    N-methyl-D-Aspartate Recpt,CSF - Cerebrospinal Fluid, Lumbar Puncture [[id number]] Collected: 07/13/24 1439    Specimen: Cerebrospinal Fluid from Lumbar Puncture Updated: 07/13/24 1458    Protime-INR [[id number]]  (Normal) Collected: 07/13/24 1022    Specimen: Blood Updated: 07/13/24 1418     Protime 13.5 Seconds      INR 1.00    Urinalysis With Culture If Indicated - Straight Cath [[id number]]  (Abnormal) Collected: 07/13/24 1107    Specimen: Urine from Straight Cath Updated: 07/13/24 1139     Color, UA Dark Yellow     Appearance, UA Clear     pH, UA 6.0     Specific Gravity, UA >1.030     Glucose, UA Negative     Ketones, UA 15 mg/dL (1+)     Bilirubin, UA Negative     Blood, UA Negative     Protein, UA Negative     Leuk Esterase, UA Trace     Nitrite, UA Negative     Urobilinogen, UA 1.0 E.U./dL    Narrative:      In absence of clinical symptoms, the presence of pyuria, bacteria, and/or nitrites on the urinalysis result does not correlate with infection.    Urinalysis, Microscopic Only - Straight Cath [[id number]] Collected: 07/13/24 1107    Specimen: Urine from Straight Cath Updated: 07/13/24 1139     RBC, UA 0-2 /HPF      WBC, UA 0-2 /HPF      Comment: Urine culture not indicated.        Bacteria, UA None Seen /HPF      Squamous Epithelial Cells, UA 0-2 /HPF      Hyaline Casts, UA 0-2 /LPF      Methodology Automated Microscopy    Fentanyl, Urine - Straight Cath [[id number]]  (Normal) Collected: 07/13/24 1107    Specimen: Urine from Straight Cath Updated: 07/13/24 1132     Fentanyl, Urine Negative    Narrative:      Negative Threshold:      Fentanyl 5 ng/mL     The normal value for the drug tested is negative. This report includes final unconfirmed screening results to be used for medical treatment purposes only. Unconfirmed results must not be used for non-medical purposes such as  employment or legal testing. Clinical consideration should be applied to any drug of abuse test, particularly when unconfirmed results are used.           Urine Drug Screen - Straight Cath [[id number]]  (Normal) Collected: 07/13/24 1107    Specimen: Urine from Straight Cath Updated: 07/13/24 1131     THC, Screen, Urine Negative     Phencyclidine (PCP), Urine Negative     Cocaine Screen, Urine Negative     Methamphetamine, Ur Negative     Opiate Screen Negative     Amphetamine Screen, Urine Negative     Benzodiazepine Screen, Urine Negative     Tricyclic Antidepressants Screen Negative     Methadone Screen, Urine Negative     Barbiturates Screen, Urine Negative     Oxycodone Screen, Urine Negative     Buprenorphine, Screen, Urine Negative    Narrative:      Cutoff For Drugs Screened:    Amphetamines               500 ng/ml  Barbiturates               200 ng/ml  Benzodiazepines            150 ng/ml  Cocaine                    150 ng/ml  Methadone                  200 ng/ml  Opiates                    100 ng/ml  Phencyclidine               25 ng/ml  THC                         50 ng/ml  Methamphetamine            500 ng/ml  Tricyclic Antidepressants  300 ng/ml  Oxycodone                  100 ng/ml  Buprenorphine               10 ng/ml    The normal value for all drugs tested is negative. This report includes unconfirmed screening results, with the cutoff values listed, to be used for medical treatment purposes only.  Unconfirmed results must not be used for non-medical purposes such as employment or legal testing.  Clinical consideration should be applied to any drug of abuse test, particularly when unconfirmed results are used.      COVID-19 and FLU A/B PCR, 1 HR TAT - Swab, Nasopharynx [[id number]]  (Normal) Collected: 07/13/24 1057    Specimen: Swab from Nasopharynx Updated: 07/13/24 1122     COVID19 Not Detected     Influenza A PCR Not Detected     Influenza B PCR Not Detected    Narrative:      Fact sheet for  providers: https://www.fda.gov/media/532709/download    Fact sheet for patients: https://www.fda.gov/media/269204/download    Test performed by PCR.    Magnesium [[id number]]  (Normal) Collected: 07/13/24 1022    Specimen: Blood Updated: 07/13/24 1047     Magnesium 2.2 mg/dL     Comprehensive Metabolic Panel [[id number]]  (Abnormal) Collected: 07/13/24 1022    Specimen: Blood Updated: 07/13/24 1047     Glucose 156 mg/dL      BUN 14 mg/dL      Creatinine 1.04 mg/dL      Sodium 135 mmol/L      Potassium 4.2 mmol/L      Chloride 98 mmol/L      CO2 26.0 mmol/L      Calcium 8.9 mg/dL      Total Protein 7.3 g/dL      Albumin 4.2 g/dL      ALT (SGPT) 31 U/L      AST (SGOT) 22 U/L      Alkaline Phosphatase 66 U/L      Total Bilirubin 0.6 mg/dL      Globulin 3.1 gm/dL      A/G Ratio 1.4 g/dL      BUN/Creatinine Ratio 13.5     Anion Gap 11.0 mmol/L      eGFR 82.2 mL/min/1.73     Narrative:      GFR Normal >60  Chronic Kidney Disease <60  Kidney Failure <15      CBC & Differential [[id number]]  (Abnormal) Collected: 07/13/24 1022    Specimen: Blood Updated: 07/13/24 1034    Narrative:      The following orders were created for panel order CBC & Differential.  Procedure                               Abnormality         Status                     ---------                               -----------         ------                     CBC Auto Differential[[id number]]        Abnormal            Final result                 Please view results for these tests on the individual orders.    CBC Auto Differential [[id number]]  (Abnormal) Collected: 07/13/24 1022    Specimen: Blood Updated: 07/13/24 1034     WBC 7.62 10*3/mm3      RBC 5.28 10*6/mm3      Hemoglobin 15.7 g/dL      Hematocrit 45.5 %      MCV 86.2 fL      MCH 29.7 pg      MCHC 34.5 g/dL      RDW 13.0 %      RDW-SD 41.1 fl      MPV 10.5 fL      Platelets 160 10*3/mm3      Neutrophil % 74.3 %      Lymphocyte % 19.9 %      Monocyte % 4.7 %      Eosinophil % 0.5 %      Basophil % 0.1  %      Immature Grans % 0.5 %      Neutrophils, Absolute 5.65 10*3/mm3      Lymphocytes, Absolute 1.52 10*3/mm3      Monocytes, Absolute 0.36 10*3/mm3      Eosinophils, Absolute 0.04 10*3/mm3      Basophils, Absolute 0.01 10*3/mm3      Immature Grans, Absolute 0.04 10*3/mm3      nRBC 0.0 /100 WBC     [place] Draw [[id number]] Collected: 07/13/24 1022    Specimen: Blood Updated: 07/13/24 1031    Narrative:      The following orders were created for panel order [place] Draw.  Procedure                               Abnormality         Status                     ---------                               -----------         ------                     Green Top (Gel)[[id number]]                                  Final result               Lavender Top[[id number]]                                     Final result               Red Top[[id number]]                                          Final result               Ramirez Top[[id number]]                                         Final result               Light Blue Top[[id number]]                                   Final result                 Please view results for these tests on the individual orders.    Green Top (Gel) [[id number]] Collected: 07/13/24 1022    Specimen: Blood Updated: 07/13/24 1031     Extra Tube Hold for add-ons.     Comment: Auto resulted.       Lavender Top [[id number]] Collected: 07/13/24 1022    Specimen: Blood Updated: 07/13/24 1031     Extra Tube hold for add-on     Comment: Auto resulted       Red Top [[id number]] Collected: 07/13/24 1022    Specimen: Blood Updated: 07/13/24 1031     Extra Tube Hold for add-ons.     Comment: Auto resulted.       Gray Top [[id number]] Collected: 07/13/24 1022    Specimen: Blood Updated: 07/13/24 1031     Extra Tube Hold for add-ons.     Comment: Auto resulted.       Light Blue Top [[id number]] Collected: 07/13/24 1022    Specimen: Blood Updated: 07/13/24 1031     Extra Tube Hold for add-ons.     Comment: Auto resulted       POC  Glucose Once [[id number]]  (Abnormal) Collected: 07/13/24 1016    Specimen: Blood Updated: 07/13/24 1029     Glucose 139 mg/dL      Comment: : [id number] Cruz ID: [id number]             Imaging Results (Last 24 Hours)       Procedure Component Value Units Date/Time    CT Angiogram Head w AI Analysis of LVO [[id number]] Collected: 07/13/24 1109     Updated: 07/13/24 1115    Narrative:      EXAM: CT ANGIOGRAM HEAD W AI ANALYSIS OF LVO-, CT ANGIOGRAM NECK- -  7/13/2024 9:40 AM     HISTORY: workup for stroke; altered, not a code stroke       COMPARISON: None available.     DOSE LENGTH PRODUCT: 1577.03 mGy.cm (accession [id number]). Automated  exposure control was also utilized to decrease patient radiation dose.     TECHNIQUE: CTA head and neck performed with intravenous contrast. 3D  postprocessing, including MIPs, performed and images saved to PACS. AI  ANALYSIS OF LVO WAS UTILIZED.  Grading of carotid stenosis performed  with NASCET criteria.     FINDINGS:     CTA HEAD: Distal ICAs are patent and without flow-limiting stenosis. No  intracranial large vessel occlusion. Anterior and middle cerebral  arteries are patent and without flow-limiting stenosis. Intracranial  vertebral arteries and basilar artery are normal in caliber. Posterior  cerebral arteries and poterior circulation are patent and normal in  caliber. No visualized aneurysm or vascular malformation. The dural  venous sinuses are unremarkable with no filling defect.      CTA NECK: Great vessels originate normally from the aortic arch.. Common  carotid arteries are patent and without flow-limiting stenosis. The  bilateral internal carotid arteries are patent without stenosis or  occlusion. The distal right internal carotid artery is torturous. The  vertebral arteries are opacified without significant ostial narrowing or  stenosis. The right vertebral artery is dominant. No evidence of  vertebral artery dissection or pseudoaneurysm.     The  submitted soft tissues of the neck are unremarkable..  Lung apices are clear. There is mucosal thickening of the frontal  sinuses, ethmoid air cells, sphenoid sinus, and bilateral maxillary  sinuses. The mastoid air cells are clear.       Impression:         1. No arterial occlusion or flow-limiting stenosis in the neck.  2. No intracranial large vessel occlusion or flow-limiting stenosis.     This report was signed and finalized on 7/13/2024 11:12 AM by Glen Ace.       CT Angiogram Neck [[id number]] Collected: 07/13/24 1109     Updated: 07/13/24 1115    Narrative:      EXAM: CT ANGIOGRAM HEAD W AI ANALYSIS OF LVO-, CT ANGIOGRAM NECK- -  7/13/2024 9:40 AM     HISTORY: workup for stroke; altered, not a code stroke       COMPARISON: None available.     DOSE LENGTH PRODUCT: 1577.03 mGy.cm (accession [id number]). Automated  exposure control was also utilized to decrease patient radiation dose.     TECHNIQUE: CTA head and neck performed with intravenous contrast. 3D  postprocessing, including MIPs, performed and images saved to PACS. AI  ANALYSIS OF LVO WAS UTILIZED.  Grading of carotid stenosis performed  with NASCET criteria.     FINDINGS:     CTA HEAD: Distal ICAs are patent and without flow-limiting stenosis. No  intracranial large vessel occlusion. Anterior and middle cerebral  arteries are patent and without flow-limiting stenosis. Intracranial  vertebral arteries and basilar artery are normal in caliber. Posterior  cerebral arteries and poterior circulation are patent and normal in  caliber. No visualized aneurysm or vascular malformation. The dural  venous sinuses are unremarkable with no filling defect.      CTA NECK: Great vessels originate normally from the aortic arch.. Common  carotid arteries are patent and without flow-limiting stenosis. The  bilateral internal carotid arteries are patent without stenosis or  occlusion. The distal right internal carotid artery is torturous. The  vertebral arteries  are opacified without significant ostial narrowing or  stenosis. The right vertebral artery is dominant. No evidence of  vertebral artery dissection or pseudoaneurysm.     The submitted soft tissues of the neck are unremarkable..  Lung apices are clear. There is mucosal thickening of the frontal  sinuses, ethmoid air cells, sphenoid sinus, and bilateral maxillary  sinuses. The mastoid air cells are clear.       Impression:         1. No arterial occlusion or flow-limiting stenosis in the neck.  2. No intracranial large vessel occlusion or flow-limiting stenosis.     This report was signed and finalized on 7/13/2024 11:12 AM by Glen Ace.       CT Head Without Contrast [[id number]] Collected: 07/13/24 1106     Updated: 07/13/24 1112    Narrative:      EXAM: CT HEAD WO CONTRAST-      DATE: 7/13/2024 9:40 AM     HISTORY: eval for stroke       COMPARISON: None available.     DOSE LENGTH PRODUCT: 1577.03 mGy.cm  Automated exposure control was also  utilized to decrease patient radiation dose.     TECHNIQUE: Unenhanced CT images obtained from vertex to skull base with  multiplanar reformats.     FINDINGS:  There is no acute intracranial hemorrhage, midline shift, mass effect,  or hydrocephalus. There is no CT evidence for acute infarct.        There is mucosal thickening at the frontal sinuses, ethmoid air cells,  and sphenoid sinus. The mastoid air cells are clear. Scalp soft tissues  are unremarkable. Bilateral orbits and globes are unremarkable.       Impression:         1. No acute intracranial findings.   2. Paranasal sinus mucosal thickening.     This report was signed and finalized on 7/13/2024 11:08 AM by Glen Ace.       XR Chest 1 View [[id number]] Collected: 07/13/24 1105     Updated: 07/13/24 1109    Narrative:      EXAM: XR CHEST 1 VW-      DATE: 7/13/2024 9:39 AM     HISTORY: AMS recent sinusitis       COMPARISON: None available.     TECHNIQUE:  Frontal view(s) of the chest submitted.      FINDINGS:    There are low lung volumes but the lungs are grossly clear. Heart and  mediastinum are unremarkable. No effusion or pneumothorax is seen.          Impression:         1. No active disease in the chest.     This report was signed and finalized on 7/13/2024 11:05 AM by Glen Ace.             I have personally reviewed and interpreted the radiology studies and ECG obtained at time of admission.     Assessment / Plan   Assessment:   Active Hospital Problems    Diagnosis     **Altered mental status        Treatment Plan  The patient will be admitted to my service here at [facility].  1.  Acute encephalopathy  2.  History of hyperlipidemia  3.  History of hypertension  4.  History of GERD        The patient will be admitted, neurology consult was placed, neurochecks, patient to have an MRI of the brain, physical therapy, Occupational Therapy and speech therapy, will follow-up on final results of lumbar puncture, patient was started on aspirin and statin.      Medical Decision Making  Number and Complexity of problems: 4  Differential Diagnosis: None    Conditions and Status        Condition is unchanged.     MDM Data  External documents reviewed: n/a  Cardiac tracing (EKG, telemetry) interpretation: sinus   Radiology interpretation: CT brain   Labs reviewed: yes  Any tests that were considered but not ordered: none     Decision rules/scores evaluated (example [id number], Wells, etc): n/a     Discussed with: wife , son , nursing staff      Care Planning  Shared decision making: patient  Code status and discussions: full    Disposition  Social Determinants of Health that impact treatment or disposition: none  Estimated length of stay is 2 days.     I confirmed that the patient's advanced care plan is present, code status is documented, and a surrogate decision maker is listed in the patient's medical record.     The patient's surrogate decision maker is patient.     The patient was seen  and examined by me on 7/13/2024  at 1440.    Electronically signed by Andrew Corrigan MD, 07/13/24, 14:58 CDT.

## 2024-07-14 ENCOUNTER — APPOINTMENT (OUTPATIENT)
Dept: CARDIOLOGY | Facility: HOSPITAL | Age: 59
End: 2024-07-14
Payer: COMMERCIAL

## 2024-07-14 ENCOUNTER — APPOINTMENT (OUTPATIENT)
Dept: MRI IMAGING | Facility: HOSPITAL | Age: 59
End: 2024-07-14
Payer: COMMERCIAL

## 2024-07-14 LAB
BH CV ECHO MEAS - AO MAX PG: 2.34 MMHG
BH CV ECHO MEAS - AO MEAN PG: 1.39 MMHG
BH CV ECHO MEAS - AO ROOT DIAM: 4.3 CM
BH CV ECHO MEAS - AO V2 MAX: 76.5 CM/SEC
BH CV ECHO MEAS - AO V2 VTI: 13.6 CM
BH CV ECHO MEAS - AVA(I,D): 5 CM2
BH CV ECHO MEAS - EDV(MOD-SP4): 97.2 ML
BH CV ECHO MEAS - EF(MOD-SP4): 52.3 %
BH CV ECHO MEAS - ESV(MOD-SP4): 46.4 ML
BH CV ECHO MEAS - LV DIASTOLIC VOL/BSA (35-75): 37.2 CM2
BH CV ECHO MEAS - LV MAX PG: 1.1 MMHG
BH CV ECHO MEAS - LV MEAN PG: 0.85 MMHG
BH CV ECHO MEAS - LV SYSTOLIC VOL/BSA (12-30): 17.8 CM2
BH CV ECHO MEAS - LV V1 MAX: 52.5 CM/SEC
BH CV ECHO MEAS - LV V1 VTI: 11.6 CM
BH CV ECHO MEAS - LVOT AREA: 5.9 CM2
BH CV ECHO MEAS - LVOT DIAM: 2.7 CM
BH CV ECHO MEAS - MV A MAX VEL: 49 CM/SEC
BH CV ECHO MEAS - MV DEC SLOPE: 259.5 CM/SEC2
BH CV ECHO MEAS - MV DEC TIME: 0.18 SEC
BH CV ECHO MEAS - MV E MAX VEL: 37.3 CM/SEC
BH CV ECHO MEAS - MV E/A: 0.76
BH CV ECHO MEAS - MV MAX PG: 1.83 MMHG
BH CV ECHO MEAS - MV MEAN PG: 0.94 MMHG
BH CV ECHO MEAS - MV V2 VTI: 13.7 CM
BH CV ECHO MEAS - MVA(VTI): 5 CM2
BH CV ECHO MEAS - PA V2 MAX: 100.9 CM/SEC
BH CV ECHO MEAS - SV(LVOT): 68.7 ML
BH CV ECHO MEAS - SV(MOD-SP4): 50.9 ML
BH CV ECHO MEAS - SVI(LVOT): 26.3 ML/M2
BH CV ECHO MEAS - SVI(MOD-SP4): 19.5 ML/M2
BH CV ECHO SHUNT ASSESSMENT PERFORMED (HIDDEN SCRIPTING): 1
BILIRUB UR QL STRIP: NEGATIVE
CHOLEST SERPL-MCNC: 165 MG/DL (ref 0–200)
CLARITY UR: ABNORMAL
COLOR UR: YELLOW
D-LACTATE SERPL-SCNC: 1.4 MMOL/L (ref 0.5–2)
GLUCOSE UR STRIP-MCNC: NEGATIVE MG/DL
HBA1C MFR BLD: 6.5 % (ref 4.8–5.6)
HDLC SERPL-MCNC: 22 MG/DL (ref 40–60)
HGB UR QL STRIP.AUTO: NEGATIVE
KETONES UR QL STRIP: ABNORMAL
LDLC SERPL CALC-MCNC: 113 MG/DL (ref 0–100)
LDLC/HDLC SERPL: 4.99 {RATIO}
LEUKOCYTE ESTERASE UR QL STRIP.AUTO: NEGATIVE
NITRITE UR QL STRIP: NEGATIVE
PH UR STRIP.AUTO: 7 [PH] (ref 5–8)
PROT UR QL STRIP: NEGATIVE
SP GR UR STRIP: 1.02 (ref 1–1.03)
TRIGL SERPL-MCNC: 166 MG/DL (ref 0–150)
UROBILINOGEN UR QL STRIP: ABNORMAL
VLDLC SERPL-MCNC: 30 MG/DL (ref 5–40)

## 2024-07-14 PROCEDURE — 87040 BLOOD CULTURE FOR BACTERIA: CPT | Performed by: INTERNAL MEDICINE

## 2024-07-14 PROCEDURE — 81003 URINALYSIS AUTO W/O SCOPE: CPT | Performed by: INTERNAL MEDICINE

## 2024-07-14 PROCEDURE — 25810000003 SODIUM CHLORIDE 0.9 % SOLUTION: Performed by: INTERNAL MEDICINE

## 2024-07-14 PROCEDURE — 0 GADOBENATE DIMEGLUMINE 529 MG/ML SOLUTION: Performed by: INTERNAL MEDICINE

## 2024-07-14 PROCEDURE — 93306 TTE W/DOPPLER COMPLETE: CPT

## 2024-07-14 PROCEDURE — 83036 HEMOGLOBIN GLYCOSYLATED A1C: CPT | Performed by: INTERNAL MEDICINE

## 2024-07-14 PROCEDURE — 70553 MRI BRAIN STEM W/O & W/DYE: CPT

## 2024-07-14 PROCEDURE — 99233 SBSQ HOSP IP/OBS HIGH 50: CPT | Performed by: PSYCHIATRY & NEUROLOGY

## 2024-07-14 PROCEDURE — A9577 INJ MULTIHANCE: HCPCS | Performed by: INTERNAL MEDICINE

## 2024-07-14 PROCEDURE — 25010000002 LORAZEPAM PER 2 MG: Performed by: FAMILY MEDICINE

## 2024-07-14 PROCEDURE — 92526 ORAL FUNCTION THERAPY: CPT

## 2024-07-14 PROCEDURE — 83605 ASSAY OF LACTIC ACID: CPT | Performed by: INTERNAL MEDICINE

## 2024-07-14 PROCEDURE — 25010000002 ZIPRASIDONE MESYLATE PER 10 MG: Performed by: INTERNAL MEDICINE

## 2024-07-14 PROCEDURE — 25010000002 LORAZEPAM PER 2 MG: Performed by: CLINICAL NURSE SPECIALIST

## 2024-07-14 PROCEDURE — 80061 LIPID PANEL: CPT | Performed by: INTERNAL MEDICINE

## 2024-07-14 PROCEDURE — 93306 TTE W/DOPPLER COMPLETE: CPT | Performed by: INTERNAL MEDICINE

## 2024-07-14 RX ORDER — LORAZEPAM 2 MG/ML
2 INJECTION INTRAMUSCULAR ONCE
Status: COMPLETED | OUTPATIENT
Start: 2024-07-14 | End: 2024-07-14

## 2024-07-14 RX ORDER — ACETAMINOPHEN 325 MG/1
650 TABLET ORAL EVERY 6 HOURS PRN
Status: DISCONTINUED | OUTPATIENT
Start: 2024-07-14 | End: 2024-07-19 | Stop reason: HOSPADM

## 2024-07-14 RX ORDER — SODIUM CHLORIDE 9 MG/ML
100 INJECTION, SOLUTION INTRAVENOUS CONTINUOUS
Status: DISCONTINUED | OUTPATIENT
Start: 2024-07-14 | End: 2024-07-17

## 2024-07-14 RX ORDER — ACETAMINOPHEN 650 MG/1
650 SUPPOSITORY RECTAL EVERY 6 HOURS PRN
Status: DISCONTINUED | OUTPATIENT
Start: 2024-07-14 | End: 2024-07-19 | Stop reason: HOSPADM

## 2024-07-14 RX ADMIN — SODIUM CHLORIDE 100 ML/HR: 9 INJECTION, SOLUTION INTRAVENOUS at 14:22

## 2024-07-14 RX ADMIN — ACETAMINOPHEN 650 MG: 650 SUPPOSITORY RECTAL at 14:03

## 2024-07-14 RX ADMIN — ZIPRASIDONE MESYLATE 10 MG: 20 INJECTION, POWDER, LYOPHILIZED, FOR SOLUTION INTRAMUSCULAR at 12:02

## 2024-07-14 RX ADMIN — ZIPRASIDONE MESYLATE 10 MG: 20 INJECTION, POWDER, LYOPHILIZED, FOR SOLUTION INTRAMUSCULAR at 21:04

## 2024-07-14 RX ADMIN — LORAZEPAM 1 MG: 2 INJECTION, SOLUTION INTRAMUSCULAR; INTRAVENOUS at 02:04

## 2024-07-14 RX ADMIN — GADOBENATE DIMEGLUMINE 20 ML: 529 INJECTION, SOLUTION INTRAVENOUS at 12:47

## 2024-07-14 RX ADMIN — LORAZEPAM 2 MG: 2 INJECTION, SOLUTION INTRAMUSCULAR; INTRAVENOUS at 11:44

## 2024-07-14 RX ADMIN — Medication 10 ML: at 09:54

## 2024-07-14 NOTE — PROGRESS NOTES
"Wife and son updated on results of MRI brain with and without. 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 They are going to find out more and hopefully able to report back tomorrow. EEG in AM.   "

## 2024-07-14 NOTE — PLAN OF CARE
Goal Outcome Evaluation:   Pt taken for MRI, sedated with ativan and Geodon in order to obtain scan safely. Started on NS maintenance IVF at 100 mL/hr. Pt able to follow some commands at this time but still having severe aphasia sometimes being able to voice yes and no. Pt has had several episodes of urine incontinence. Pt will still not accept anything by mouth.

## 2024-07-14 NOTE — PLAN OF CARE
Goal Outcome Evaluation:   Geodon given x1, ativan given x1, Pt removing all leads, monitors, combative at times, does not follow commands, does not verbalize anything except cursing while being turned. Tmax 100.9 - IV tylenol given X1. O2 sats as low as 88%, pt refused placement of MD KELLY aware.                    Problem: Adult Inpatient Plan of Care  Goal: Plan of Care Review  Outcome: Ongoing, Progressing  Goal: Patient-Specific Goal (Individualized)  Outcome: Ongoing, Progressing  Goal: Absence of Hospital-Acquired Illness or Injury  Outcome: Ongoing, Progressing  Intervention: Identify and Manage Fall Risk  Description: Perform standard risk assessment on admission using a validated tool or comprehensive approach appropriate to the patient; reassess fall risk frequently, with change in status or transfer to another level of care.  Communicate fall injury risk to interprofessional healthcare team.  Determine need for increased observation, equipment and environmental modification, such as low bed, signage and supportive, nonskid footwear.  Adjust safety measures to individual developmental age, stage and identified risk factors.  Reinforce the importance of safety and physical activity with patient and family.  Perform regular intentional rounding to assess need for position change, pain assessment and personal needs, including assistance with toileting.  Recent Flowsheet Documentation  Taken 7/14/2024 0400 by Cheikh Wilcox, RN  Safety Promotion/Fall Prevention: safety round/check completed  Taken 7/14/2024 0300 by Cheikh Wilcox RN  Safety Promotion/Fall Prevention: safety round/check completed  Taken 7/14/2024 0200 by Cheikh Wilcox, RN  Safety Promotion/Fall Prevention: safety round/check completed  Taken 7/14/2024 0100 by Cheikh Wilcox, RN  Safety Promotion/Fall Prevention: safety round/check completed  Taken 7/14/2024 0000 by Cheikh Wilcox RN  Safety Promotion/Fall Prevention:   safety round/check completed    fall prevention program maintained  Taken 7/13/2024 2300 by Cheikh Wilcox RN  Safety Promotion/Fall Prevention: safety round/check completed  Taken 7/13/2024 2200 by Cheikh Wilcox RN  Safety Promotion/Fall Prevention: safety round/check completed  Taken 7/13/2024 2100 by Cheikh Wilcox RN  Safety Promotion/Fall Prevention: safety round/check completed  Taken 7/13/2024 2000 by Cheikh Wilcox RN  Safety Promotion/Fall Prevention: safety round/check completed  Taken 7/13/2024 1900 by Cheikh Wilcox RN  Safety Promotion/Fall Prevention:   safety round/check completed   fall prevention program maintained  Intervention: Prevent Skin Injury  Description: Perform a screening for skin injury risk, such as pressure or moisture associated skin damage on admission and at regular intervals throughout hospital stay.  Keep all areas of skin (especially folds) clean and dry.  Maintain adequate skin hydration.  Relieve and redistribute pressure and protect bony prominences; implement measures based on patient-specific risk factors.  Match turning and repositioning schedule to clinical condition.  Encourage weight shift frequently; assist with reposition if unable to complete independently.  Float heels off bed; avoid pressure on the Achilles tendon.  Keep skin free from extended contact with medical devices.  Encourage functional activity and mobility, as early as tolerated.  Use aids (e.g., slide boards, mechanical lift) during transfer.  Recent Flowsheet Documentation  Taken 7/14/2024 0300 by Cheikh Wilcox RN  Body Position: position changed independently  Taken 7/14/2024 0100 by Cheikh Wilcox RN  Body Position: position changed independently  Taken 7/13/2024 2300 by Cheikh Wilcox RN  Body Position: position changed independently  Taken 7/13/2024 2100 by Cheikh iWlcox RN  Body Position:   position changed independently   side-lying   right  Taken 7/13/2024 1900 by Cheikh Wilcox RN  Body Position: position changed  independently  Intervention: Prevent and Manage VTE (Venous Thromboembolism) Risk  Description: Assess for VTE (venous thromboembolism) risk.  Encourage and assist with early ambulation.  Initiate and maintain compression or other therapy, as indicated, based on identified risk in accordance with organizational protocol and provider order.  Encourage both active and passive leg exercises while in bed, if unable to ambulate.  Recent Flowsheet Documentation  Taken 7/13/2024 2000 by Cheikh Wilcox RN  Activity Management: bedrest  Range of Motion: active ROM (range of motion) encouraged  Intervention: Prevent Infection  Description: Maintain skin and mucous membrane integrity; promote hand, oral and pulmonary hygiene.  Optimize fluid balance, nutrition, sleep and glycemic control to maximize infection resistance.  Identify potential sources of infection early to prevent or mitigate progression of infection (e.g., wound, lines, devices).  Evaluate ongoing need for invasive devices; remove promptly when no longer indicated.  Recent Flowsheet Documentation  Taken 7/13/2024 2000 by Cheikh Wilcox RN  Infection Prevention:   rest/sleep promoted   single patient room provided   environmental surveillance performed  Goal: Optimal Comfort and Wellbeing  Outcome: Ongoing, Progressing  Intervention: Provide Person-Centered Care  Description: Use a family-focused approach to care.  Develop trust and rapport by proactively providing information, encouraging questions, addressing concerns and offering reassurance.  Acknowledge emotional response to hospitalization.  Recognize and utilize personal coping strategies.  Honor spiritual and cultural preferences.  Recent Flowsheet Documentation  Taken 7/13/2024 2000 by Cheikh Wilcox RN  Trust Relationship/Rapport:   care explained   reassurance provided  Goal: Readiness for Transition of Care  Outcome: Ongoing, Progressing

## 2024-07-14 NOTE — PLAN OF CARE
Goal Outcome Evaluation:  Plan of Care Reviewed With: patient, spouse        Progress: no change         Anticipated Discharge Disposition (SLP): unknown             Treatment Assessment (SLP): continued (07/14/24 1125)     Plan for Continued Treatment (SLP): continue treatment per plan of care (07/14/24 1125)      SLP treatment complete. He is noted to have a flat affect throughout session. The pt remains non verbal and would not follow any commands or complete any PO intake even when wife attempted to feed per SLP request. The pt also would not accept spoon or cup to complete self feeding. When gustatory stimulation was applied to the pt's lips he would quickly pull away and would not open his mouth or attempt a swallow. SLP remains unable to ensure safety with PO intake and pt should remain NPO. SLP will continue to follow and treat and complete re-assessment of swallow function when appropriate.

## 2024-07-14 NOTE — PROGRESS NOTES
"    [facility]  INPATIENT PROGRESS NOTE    Patient Name: Ty Webb  Date of Admission: 7/13/2024  Today's Date: 07/14/24  Length of Stay: 1  Primary Care Physician: Irineo Cunningham APRN    Subjective   Chief Complaint: Altered mental status   HPI   Remains nonverbal.        Review of Systems   All pertinent negatives and positives are as above. All other systems have been reviewed and are negative unless otherwise stated.     Objective    Temp:  [97.9 °F (36.6 °C)-100.5 °F (38.1 °C)] 99.5 °F (37.5 °C)  Heart Rate:  [] 87  Resp:  [18-22] 19  BP: (113-155)/(65-93) 113/66  Physical Exam    General: Patient is alert and awake, aphasic, does not follow commands.  HEENT: Normocephalic, atraumatic, pupils are equal reactive to light and accommodate.  Neck: Supple, no JVD, no carotid bruits  CVS: S1, S2, regular rhythm and rate  Lungs: Clear to auscultation bilaterally  Abdomen: Soft, nontender, nondistended bowel sounds are present  Extremities: No cyanosis, no clubbing, no edema pulses are intact  Neurologic: Patient is nonverbal.  Does not follow commands.  Appears to be confused.    Results Review:  I have reviewed the labs, radiology results, and diagnostic studies.    Laboratory Data:   Results from last 7 days   Lab Units 07/13/24  1022   WBC 10*3/mm3 7.62   HEMOGLOBIN g/dL 15.7   HEMATOCRIT % 45.5   PLATELETS 10*3/mm3 160        Results from last 7 days   Lab Units 07/13/24  1022   SODIUM mmol/L 135*   POTASSIUM mmol/L 4.2   CHLORIDE mmol/L 98   CO2 mmol/L 26.0   BUN mg/dL 14   CREATININE mg/dL 1.04   CALCIUM mg/dL 8.9   BILIRUBIN mg/dL 0.6   ALK PHOS U/L 66   ALT (SGPT) U/L 31   AST (SGOT) U/L 22   GLUCOSE mg/dL 156*       Culture Data:   No results found for: \"BLOODCX\", \"URINECX\", \"WOUNDCX\", \"MRSACX\", \"RESPCX\", \"STOOLCX\"    Radiology Data:   Imaging Results (Last 24 Hours)       ** No results found for the last 24 hours. **            I have reviewed the " patient's current medications.     Assessment/Plan   Assessment  Active Hospital Problems    Diagnosis     **Altered mental status        Treatment Plan  1.  Acute encephalopathy  2.  History of hyperlipidemia  3.  History of hypertension  4.  History of GERD      Patient had lumbar puncture yesterday, results were reviewed, no evidence of infection at this time.  MRI of the brain is still pending, patient is n.p.o., will start him on IV fluids normal saline, discussed with neurologist, patient may be started on IV steroids for suspected autoimmune encephalitis.    Medical Decision Making  Number and Complexity of problems: 4  Differential Diagnosis: none    Conditions and Status        Condition is unchanged.     [facility] Data  External documents reviewed: n/a  Cardiac tracing (EKG, telemetry) interpretation: sinus  Radiology interpretation: reviewed  Labs reviewed: yes  Any tests that were considered but not ordered: none     Decision rules/scores evaluated (example [id number], Wells, etc): n/a     Discussed with: wife, nursing staff , neurologist      Care Planning  Shared decision making: patient , wife  Code status and discussions: full    Disposition  Social Determinants of Health that impact treatment or disposition: none  I expect the patient to be discharged to home in 3 days.     Electronically signed by Andrew Corrigan MD, 07/14/24, 11:46 CDT.

## 2024-07-14 NOTE — PROGRESS NOTES
Neurology Progress Note      Chief Complaint:  Encephalopathy  Length of Stay:  1   Subjective     Subjective:    He is currently on the neuro unit.  Overnight he did have some mild agitation.  He received Geodon and then later received a low-dose Ativan.  His wife is at the bedside and assist in the history.  She reports that he has demonstrated some curse words but otherwise has been nonverbal.  She also describes that when he has to have a bowel movement or urinate that he also becomes somewhat more restless.  He is currently wearing a diaper.      Medications:  Current Facility-Administered Medications   Medication Dose Route Frequency Provider Last Rate Last Admin    aspirin tablet 325 mg  325 mg Oral Daily Andrew Corrigan MD        Or    aspirin suppository 300 mg  300 mg Rectal Daily Andrew Corrigan MD   300 mg at 07/13/24 1701    atorvastatin (LIPITOR) tablet 80 mg  80 mg Oral Nightly Andrew Corrigan MD        LORazepam (ATIVAN) injection 2 mg  2 mg Intravenous Once Jessie Li, APRFORREST        sodium chloride 0.9 % flush 10 mL  10 mL Intravenous Q12H Andrew Corrigan MD   10 mL at 07/13/24 2107    sodium chloride 0.9 % flush 10 mL  10 mL Intravenous PRN Andrew Corrigan MD        sodium chloride 0.9 % infusion 40 mL  40 mL Intravenous PRN Andrew Corrigan MD        ziprasidone (GEODON) injection 10 mg  10 mg Intramuscular Q6H PRN Andrew Corrigan MD   10 mg at 07/13/24 2103             Objective      Vital Signs  Temp:  [97.6 °F (36.4 °C)-100.5 °F (38.1 °C)] 99.5 °F (37.5 °C)  Heart Rate:  [] 87  Resp:  [15-22] 19  BP: (113-155)/() 113/66    Physical Exam:    No evidence of neck stiffness  No evidence of photophobia.  The lights are on bright room and the patient does not show any concern in regards to this.  No signs that the patient is in pain nor has a headache    Pertinent Neuro Exam:  Awake.  Eyes open.  Tracks examiner around the room with his eyes.  Disregards the majority of  his environment.  Does not follow commands.  Nonverbal.  Cranial nerves II to XII intact  Moving all extremities with great strength  No pathologic reflexes  Withdraws in all 4 extremities  No signs of gross ataxia      Last nurse assessment:  Interval:  (handoff with mary ann VERA)  1a. Level of Consciousness: 0-->Alert, keenly responsive  1b. LOC Questions: 2-->Answers neither question correctly  1c. LOC Commands: 2-->Performs neither task correctly  2. Best Gaze: 0-->Normal  3. Visual: 0-->No visual loss  4. Facial Palsy: 0-->Normal symmetrical movements  5a. Motor Arm, Left: 2-->Some effort against gravity, limb cannot get to or maintain (if cued) 90 (or 45) degrees, drifts down to bed, but has some effort against gravity (pt doesn't patricipate)  5b. Motor Arm, Right: 2-->Some effort against gravity, limb cannot get to or maintain (if cued) 90 (or 45) degrees, drifts down to bed, but has some effort against gravity (pt doesn't patricipate)  6a. Motor Leg, Left: 2-->Some effort against gravity, leg falls to bed by 5 secs, but has some effort against gravity (pt doesn't patricipate)  6b. Motor Leg, Right: 2-->Some effort against gravity, leg falls to bed by 5 secs, but has some effort against gravity (pt doesn't patricipate)  7. Limb Ataxia: 2-->Present in two limbs (pt doesn't patricipate)  8. Sensory: 0-->Normal, no sensory loss  9. Best Language: 3-->Mute, global aphasia, no usable speech or auditory comprehension  10. Dysarthria: 2-->Severe dysarthria, patients speech is so slurred as to be unintelligible in the absence of or out of proportion to any dysphasia, or is mute/anarthric  11. Extinction and Inattention (formerly Neglect): 0-->No abnormality    Total (NIH Stroke Scale): 19       Results Review:      Labs:  Lumbar puncture results:  White cells 22  Red cells 67  Protein 57.3  Glucose 79  Meningitis/encephalitis panel normal  Lyme Western blot pending  West Nile virus pending  Paraneoplastic panel  pending  ACE level pending  Autoimmune encephalitis panel pending  NMDA receptor pending  MS panel pending  Ammonia level normal  Folate level normal  TSH level normal  B12 level normal  B1 level pending    Imaging:  Head CT without contrast reviewed by me showing no acute findings  MRI brain pending.  Patient did not tolerate this yesterday secondary to some agitation    Assessment/Plan     Hospital Problem List      Altered mental status    Impression:  Altered mentation  Current testing has ruled out an infectious etiology.  No signs of a bacterial nor viral meningitis/encephalitis  Current working diagnoses is an autoimmune encephalitis  Recommend an EEG tomorrow to rule out epileptic causes    Plan:  MRI of brain with and without contrast today utilizing Ativan and Geodon as needed  EEG tomorrow  Continue following up on lumbar puncture results  May consider initiating treatment for autoimmune encephalitis as early as tomorrow utilizing Solu-Medrol 1000 mg IV daily x 5 days  Appreciate internal medicine being the primary attending.  Please continue to trend blood glucose levels if we initiate high-dose steroids as these may elevate.      Medical Decision Making    Number/Complexity of Problems  Moderate  1 undiagnosed new problem with uncertain prognosis -   1 acute illness with systemic symptoms -   High  1 acute or chronic illness that poses a threat to life/body function -   High    MDM Data  Moderate - 1/3 categories  Extensive - 2/3 categories    Category 1: 3 of the following  Review of external notes  Review of results  Ordering of each unique test  Independent historian  Category 2:  Independent interpretation of test (ex: imaging)  Category 3:  Discussion of management with another provider    Extensive     Treatment Plan  Moderate - Prescription Drug management  High  Drug therapy requiring intensive monitoring for toxicity  Decision regarding hospitalization or escalation of care  De-escalate  care/DNR decisions  Drug therapy requiring intensive monitoring (high)      Bin Johnson MD  07/14/24  09:48 CDT

## 2024-07-14 NOTE — THERAPY TREATMENT NOTE
Acute Care - Speech Language Pathology   Swallow Treatment Note [facility]     Patient Name: Ty Webb  : 1965  MRN: [id number]  Today's Date: 2024               Admit Date: 2024  SLP treatment complete. He is noted to have a flat affect throughout session. The pt remains non verbal and would not follow any commands or complete any PO intake even when wife attempted to feed per SLP request. The pt also would not accept spoon or cup to complete self feeding. When gustatory stimulation was applied to the pt's lips he would quickly pull away and would not open his mouth or attempt a swallow. SLP remains unable to ensure safety with PO intake and pt should remain NPO. SLP will continue to follow and treat and complete re-assessment of swallow function when appropriate.    Pee Michaud CCC-SLP 2024 15:09 CDT    Visit Dx:     ICD-10-CM ICD-9-CM   1. Altered mental status, unspecified altered mental status type  R41.82 780.97   2. Dysphagia, unspecified type  R13.10 787.20     Patient Active Problem List   Diagnosis    Essential hypertension    Gastroesophageal reflux disease without esophagitis    Mixed hyperlipidemia    Cough    Influenza B    Perineal pain    Altered mental status     Past Medical History:   Diagnosis Date    GERD (gastroesophageal reflux disease)     Hyperlipidemia     Hypertension      Past Surgical History:   Procedure Laterality Date    HERNIA REPAIR         SLP Recommendation and Plan                                               Daily Summary of Progress (SLP): progress toward functional goals is gradual (24 112)               Treatment Assessment (SLP): continued (24)     Plan for Continued Treatment (SLP): continue treatment per plan of care (24)         Plan of Care Reviewed With: patient, spouse  Progress: no change      SWALLOW EVALUATION (Last 72 Hours)       SLP Adult Swallow Evaluation       Row Name 24 1125 24 4120                 Rehab Evaluation    Document Type therapy note (daily note)  -CS evaluation  -CS       Subjective Information no complaints  -CS no complaints  -CS       Patient Observations alert;poorly cooperative  -CS alert;poorly cooperative  -CS       Patient/Family/Caregiver Comments/Observations Wife present  -CS Son present  -CS       Patient Effort poor  -CS poor  -CS          General Information    Patient Profile Reviewed -- yes  -CS       Pertinent History Of Current Problem -- AMS, CT of head- No acute intracranial abnormality. Lumbar puncture complete.  -CS       Current Method of Nutrition -- NPO  -CS       Precautions/Limitations, Vision -- WFL;for purposes of eval  -CS       Precautions/Limitations, Hearing -- WFL;for purposes of eval  -CS       Prior Level of Function-Communication -- WFL  -CS       Prior Level of Function-Swallowing -- no diet consistency restrictions  -CS       Plans/Goals Discussed with -- patient and family  -CS       Barriers to Rehab -- cognitive status  -CS       Patient's Goals for Discharge -- patient did not state  -CS       Family Goals for Discharge -- family did not state  -CS          Pain    Additional Documentation Pain Scale: FACES Pre/Post-Treatment (Group)  -CS Pain Scale: FACES Pre/Post-Treatment (Group)  -CS          Pain Scale: FACES Pre/Post-Treatment    Pain: FACES Scale, Pretreatment 0-->no hurt  -CS 0-->no hurt  -CS       Posttreatment Pain Rating 0-->no hurt  -CS 0-->no hurt  -CS          Oral Motor Structure and Function    Dentition Assessment -- natural, present and adequate  -CS       Secretion Management -- WNL/WFL  -CS       Mucosal Quality -- other (see comments)  CNA  -CS       Volitional Swallow -- unable to elicit  -CS       Volitional Cough -- unable to elicit  -CS          Oral Musculature and Cranial Nerve Assessment    Oral Motor General Assessment -- unable to assess  -CS          General Eating/Swallowing Observations    Respiratory Support  Currently in Use -- room air  -CS          Clinical Swallow Eval    Clinical Swallow Evaluation Summary -- See note  -CS          SLP Evaluation Clinical Impression    SLP Swallowing Diagnosis -- severe;oral dysphagia;suspected pharyngeal dysphagia;R/O pharyngeal dysphagia  -CS       Functional Impact -- risk of aspiration/pneumonia;risk of malnutrition  -CS       Rehab Potential/Prognosis, Swallowing -- adequate, monitor progress closely  -CS       Swallow Criteria for Skilled Therapeutic Interventions Met -- demonstrates skilled criteria  -CS          SLP Treatment Clinical Impressions    Treatment Assessment (SLP) continued  -CS --       Daily Summary of Progress (SLP) progress toward functional goals is gradual  -CS --       Barriers to Overall Progress (SLP) Cognitive status  -CS --       Plan for Continued Treatment (SLP) continue treatment per plan of care  -CS --       Care Plan Review care plan/treatment goals reviewed  -CS --       Care Plan Review, Other Participant(s) spouse  -CS --          Recommendations    Therapy Frequency (Swallow) -- at least;3 days per week  -CS       Predicted Duration Therapy Intervention (Days) -- until discharge  -CS       SLP Diet Recommendation -- NPO  -CS       Oral Care Recommendations -- Oral Care BID/PRN  -CS       SLP Rec. for Method of Medication Administration -- meds via alternate route  -CS       Monitor for Signs of Aspiration -- yes;notify SLP if any concerns  -CS       Anticipated Discharge Disposition (SLP) -- unknown  -CS          Swallow Goals (SLP)    Swallow LTGs Swallow Long Term Goal (free text)  -CS Swallow Long Term Goal (free text)  -CS       Swallow STGs diet tolerance goal selection (SLP)  -CS diet tolerance goal selection (SLP)  -CS       Diet Tolerance Goal Selection (SLP) Patient will tolerate trials of  -CS Patient will tolerate trials of  -CS          (LTG) Swallow    (LTG) Swallow Pt will tolerate LRD w/o any overt s/s of aspiration.  -CS Pt  will tolerate LRD w/o any overt s/s of aspiration.  -CS       [place] (Swallow Long Term Goal) with minimal cues (75-90% accuracy)  -CS with minimal cues (75-90% accuracy)  -CS       Time Frame (Swallow Long Term Goal) by discharge  -CS by discharge  -CS       Barriers (Swallow Long Term Goal) cog status  -CS cog status  -CS       Progress/Outcomes (Swallow Long Term Goal) goal ongoing  -CS new goal  -CS          (STG) Patient will tolerate trials of    Consistencies Trialed (Tolerate trials) regular textures;pureed textures;thin liquids;nectar/ mildly thick liquids;honey/ moderately thick liquids  -CS regular textures;pureed textures;thin liquids;nectar/ mildly thick liquids;honey/ moderately thick liquids  -CS       Desired Outcome (Tolerate trials) without signs/symptoms of aspiration;without signs of distress  -CS without signs/symptoms of aspiration;without signs of distress  -CS       [place] (Tolerate trials) with minimal cues (75-90% accuracy)  -CS with minimal cues (75-90% accuracy)  -CS       Time Frame (Tolerate trials) by discharge  -CS by discharge  -CS       Progress/Outcomes (Tolerate trials) goal ongoing  -CS new goal  -CS                 User Key  (r) = Recorded By, (t) = Taken By, (c) = Cosigned By      Initials Name Effective Dates    CS Pee Michaud, [facility]-SLP 05/07/24 -                     EDUCATION  The patient has been educated in the following areas:   Dysphagia (Swallowing Impairment).        SLP GOALS       Row Name 07/14/24 1125 07/13/24 1425          (LTG) Swallow    (LTG) Swallow Pt will tolerate LRD w/o any overt s/s of aspiration.  -CS Pt will tolerate LRD w/o any overt s/s of aspiration.  -CS     [place] (Swallow Long Term Goal) with minimal cues (75-90% accuracy)  -CS with minimal cues (75-90% accuracy)  -CS     Time Frame (Swallow Long Term Goal) by discharge  -CS by discharge  -CS     Barriers (Swallow Long Term Goal) cog status  -CS cog status  -CS      Progress/Outcomes (Swallow Long Term Goal) goal ongoing  -CS new goal  -CS        (STG) Patient will tolerate trials of    Consistencies Trialed (Tolerate trials) regular textures;pureed textures;thin liquids;nectar/ mildly thick liquids;honey/ moderately thick liquids  -CS regular textures;pureed textures;thin liquids;nectar/ mildly thick liquids;honey/ moderately thick liquids  -CS     Desired Outcome (Tolerate trials) without signs/symptoms of aspiration;without signs of distress  -CS without signs/symptoms of aspiration;without signs of distress  -CS     [place] (Tolerate trials) with minimal cues (75-90% accuracy)  -CS with minimal cues (75-90% accuracy)  -CS     Time Frame (Tolerate trials) by discharge  -CS by discharge  -CS     Progress/Outcomes (Tolerate trials) goal ongoing  -CS new goal  -CS               User Key  (r) = Recorded By, (t) = Taken By, (c) = Cosigned By      Initials Name Provider Type    CS Pee Michaud CCC-SLP Speech and Language Pathologist                         Time Calculation:    Time Calculation- SLP       Row Name 07/14/24 1508             Time Calculation- SLP    SLP Start Time 1125  -CS      SLP Stop Time 1205  -CS      SLP Time Calculation (min) 40 min  -CS      SLP Received On 07/14/24  -CS         Untimed Charges    45070-HW Treatment Swallow Minutes 40  -CS         Total Minutes    Untimed Charges Total Minutes 40  -CS       Total Minutes 40  -CS                User Key  (r) = Recorded By, (t) = Taken By, (c) = Cosigned By      Initials Name Provider Type    CS Pee Michaud CCC-SLP Speech and Language Pathologist                    Therapy Charges for Today       Code Description Service Date Service Provider Modifiers Qty    05027938100 HC ST EVAL ORAL PHARYNG SWALLOW 4 7/13/2024 Pee Michaud CCC-SLP GN 1    [id number] HC ST TREATMENT SWALLOW 3 7/14/2024 Pee Michaud CCC-SLP GN 1                 KATHERINE Vyas  7/14/2024

## 2024-07-15 ENCOUNTER — APPOINTMENT (OUTPATIENT)
Dept: NEUROLOGY | Facility: HOSPITAL | Age: 59
End: 2024-07-15
Payer: COMMERCIAL

## 2024-07-15 ENCOUNTER — APPOINTMENT (OUTPATIENT)
Dept: CT IMAGING | Facility: HOSPITAL | Age: 59
End: 2024-07-15
Payer: COMMERCIAL

## 2024-07-15 LAB
ANION GAP SERPL CALCULATED.3IONS-SCNC: 11 MMOL/L (ref 5–15)
BASOPHILS # BLD AUTO: 0.02 10*3/MM3 (ref 0–0.2)
BASOPHILS NFR BLD AUTO: 0.3 % (ref 0–1.5)
BUN SERPL-MCNC: 18 MG/DL (ref 8–23)
BUN/CREAT SERPL: 18.6 (ref 7–25)
CALCIUM SPEC-SCNC: 8.6 MG/DL (ref 8.6–10.5)
CHLORIDE SERPL-SCNC: 100 MMOL/L (ref 98–107)
CO2 SERPL-SCNC: 26 MMOL/L (ref 22–29)
CREAT SERPL-MCNC: 0.97 MG/DL (ref 0.76–1.27)
DEPRECATED RDW RBC AUTO: 42.2 FL (ref 37–54)
EGFRCR SERPLBLD CKD-EPI 2021: 89.4 ML/MIN/1.73
EOSINOPHIL # BLD AUTO: 0.04 10*3/MM3 (ref 0–0.4)
EOSINOPHIL NFR BLD AUTO: 0.5 % (ref 0.3–6.2)
ERYTHROCYTE [DISTWIDTH] IN BLOOD BY AUTOMATED COUNT: 13.1 % (ref 12.3–15.4)
GLUCOSE SERPL-MCNC: 110 MG/DL (ref 65–99)
HCT VFR BLD AUTO: 47.3 % (ref 37.5–51)
HGB BLD-MCNC: 16 G/DL (ref 13–17.7)
IMM GRANULOCYTES # BLD AUTO: 0.03 10*3/MM3 (ref 0–0.05)
IMM GRANULOCYTES NFR BLD AUTO: 0.4 % (ref 0–0.5)
LYMPHOCYTES # BLD AUTO: 1.72 10*3/MM3 (ref 0.7–3.1)
LYMPHOCYTES NFR BLD AUTO: 23.1 % (ref 19.6–45.3)
MCH RBC QN AUTO: 29.7 PG (ref 26.6–33)
MCHC RBC AUTO-ENTMCNC: 33.8 G/DL (ref 31.5–35.7)
MCV RBC AUTO: 87.8 FL (ref 79–97)
MONOCYTES # BLD AUTO: 0.62 10*3/MM3 (ref 0.1–0.9)
MONOCYTES NFR BLD AUTO: 8.3 % (ref 5–12)
NEUTROPHILS NFR BLD AUTO: 5 10*3/MM3 (ref 1.7–7)
NEUTROPHILS NFR BLD AUTO: 67.4 % (ref 42.7–76)
NRBC BLD AUTO-RTO: 0 /100 WBC (ref 0–0.2)
PLATELET # BLD AUTO: 160 10*3/MM3 (ref 140–450)
PMV BLD AUTO: 10.4 FL (ref 6–12)
POTASSIUM SERPL-SCNC: 4.2 MMOL/L (ref 3.5–5.2)
RBC # BLD AUTO: 5.39 10*6/MM3 (ref 4.14–5.8)
SODIUM SERPL-SCNC: 137 MMOL/L (ref 136–145)
WBC NRBC COR # BLD AUTO: 7.43 10*3/MM3 (ref 3.4–10.8)

## 2024-07-15 PROCEDURE — 25010000002 METHYLPREDNISOLONE PER 125 MG: Performed by: STUDENT IN AN ORGANIZED HEALTH CARE EDUCATION/TRAINING PROGRAM

## 2024-07-15 PROCEDURE — 95816 EEG AWAKE AND DROWSY: CPT | Performed by: PSYCHIATRY & NEUROLOGY

## 2024-07-15 PROCEDURE — 97166 OT EVAL MOD COMPLEX 45 MIN: CPT

## 2024-07-15 PROCEDURE — 85025 COMPLETE CBC W/AUTO DIFF WBC: CPT | Performed by: INTERNAL MEDICINE

## 2024-07-15 PROCEDURE — 95816 EEG AWAKE AND DROWSY: CPT

## 2024-07-15 PROCEDURE — 80048 BASIC METABOLIC PNL TOTAL CA: CPT | Performed by: INTERNAL MEDICINE

## 2024-07-15 PROCEDURE — 25510000001 IOPAMIDOL 61 % SOLUTION: Performed by: FAMILY MEDICINE

## 2024-07-15 PROCEDURE — 92526 ORAL FUNCTION THERAPY: CPT | Performed by: SPEECH-LANGUAGE PATHOLOGIST

## 2024-07-15 PROCEDURE — 25010000002 ZIPRASIDONE MESYLATE PER 10 MG: Performed by: INTERNAL MEDICINE

## 2024-07-15 PROCEDURE — 25810000003 SODIUM CHLORIDE 0.9 % SOLUTION: Performed by: INTERNAL MEDICINE

## 2024-07-15 PROCEDURE — 99233 SBSQ HOSP IP/OBS HIGH 50: CPT | Performed by: STUDENT IN AN ORGANIZED HEALTH CARE EDUCATION/TRAINING PROGRAM

## 2024-07-15 PROCEDURE — 97163 PT EVAL HIGH COMPLEX 45 MIN: CPT | Performed by: PHYSICAL THERAPIST

## 2024-07-15 PROCEDURE — 25010000002 CEFTRIAXONE PER 250 MG: Performed by: FAMILY MEDICINE

## 2024-07-15 PROCEDURE — 74160 CT ABDOMEN W/CONTRAST: CPT

## 2024-07-15 RX ORDER — PREDNISONE 20 MG/1
40 TABLET ORAL
Status: DISCONTINUED | OUTPATIENT
Start: 2024-08-17 | End: 2024-07-19 | Stop reason: HOSPADM

## 2024-07-15 RX ORDER — PREDNISONE 20 MG/1
60 TABLET ORAL
Status: DISCONTINUED | OUTPATIENT
Start: 2024-07-20 | End: 2024-07-19 | Stop reason: HOSPADM

## 2024-07-15 RX ORDER — PREDNISONE 20 MG/1
20 TABLET ORAL
Status: DISCONTINUED | OUTPATIENT
Start: 2024-09-14 | End: 2024-07-19 | Stop reason: HOSPADM

## 2024-07-15 RX ORDER — ONDANSETRON 2 MG/ML
4 INJECTION INTRAMUSCULAR; INTRAVENOUS EVERY 6 HOURS PRN
Status: DISCONTINUED | OUTPATIENT
Start: 2024-07-15 | End: 2024-07-19 | Stop reason: HOSPADM

## 2024-07-15 RX ADMIN — ZIPRASIDONE MESYLATE 10 MG: 20 INJECTION, POWDER, LYOPHILIZED, FOR SOLUTION INTRAMUSCULAR at 03:04

## 2024-07-15 RX ADMIN — Medication 10 ML: at 20:18

## 2024-07-15 RX ADMIN — ZIPRASIDONE MESYLATE 10 MG: 20 INJECTION, POWDER, LYOPHILIZED, FOR SOLUTION INTRAMUSCULAR at 12:26

## 2024-07-15 RX ADMIN — SODIUM CHLORIDE 100 ML/HR: 9 INJECTION, SOLUTION INTRAVENOUS at 00:15

## 2024-07-15 RX ADMIN — SODIUM CHLORIDE 1000 MG: 900 INJECTION INTRAVENOUS at 09:39

## 2024-07-15 RX ADMIN — IOPAMIDOL 100 ML: 612 INJECTION, SOLUTION INTRAVENOUS at 15:45

## 2024-07-15 RX ADMIN — SODIUM CHLORIDE 1000 MG: 9 INJECTION, SOLUTION INTRAVENOUS at 14:34

## 2024-07-15 RX ADMIN — SODIUM CHLORIDE 100 ML/HR: 9 INJECTION, SOLUTION INTRAVENOUS at 09:47

## 2024-07-15 RX ADMIN — SODIUM CHLORIDE 100 ML/HR: 9 INJECTION, SOLUTION INTRAVENOUS at 22:31

## 2024-07-15 RX ADMIN — ATORVASTATIN CALCIUM 80 MG: 40 TABLET ORAL at 20:19

## 2024-07-15 NOTE — THERAPY EVALUATION
Patient Name: Ty Webb  : 1965    MRN: [id number]                              Today's Date: 7/15/2024       Admit Date: 2024    Visit Dx:     ICD-10-CM ICD-9-CM   1. Altered mental status, unspecified altered mental status type  R41.82 780.97   2. Dysphagia, unspecified type  R13.10 787.20   3. Impaired mobility [Z74.09]  Z74.09 799.89     Patient Active Problem List   Diagnosis    Essential hypertension    Gastroesophageal reflux disease without esophagitis    Mixed hyperlipidemia    Cough    Influenza B    Perineal pain    Altered mental status     Past Medical History:   Diagnosis Date    GERD (gastroesophageal reflux disease)     Hyperlipidemia     Hypertension      Past Surgical History:   Procedure Laterality Date    HERNIA REPAIR        General Information       Row Name 07/15/24 1305          Physical Therapy Time and Intention    Document Type evaluation  presents with AMS, s/p lumbar puncture on   -SB     Mode of Treatment physical therapy  -SB       Row Name 07/15/24 1305          General Information    Patient Profile Reviewed yes  -SB     Prior Level of Function independent:;all household mobility;community mobility;ADL's;driving;work  -SB     Existing Precautions/Restrictions fall  -SB     Barriers to Rehab cognitive status;medically complex;language barrier  expressive aphasia  -SB       Row Name 07/15/24 1305          Living Environment    People in Home spouse;child(marycruz), adult  -SB       Row Name 07/15/24 1305          Home Main Entrance    Number of Stairs, Main Entrance two  -SB     Stair Railings, Main Entrance none  -SB       Row Name 07/15/24 1305          Stairs Within Home, Primary    Number of Stairs, Within Home, Primary none  -SB       Row Name 07/15/24 1305          Cognition    Orientation Status (Cognition) oriented to;person;disoriented to;place;situation;time;verbal cues/prompts needed for orientation  -SB       Row Name 07/15/24 1305          Safety Issues,  Functional Mobility    Safety Issues Affecting Function (Mobility) ability to follow commands;friction/shear risk;insight into deficits/self-awareness;problem-solving;safety precaution awareness;safety precautions follow-through/compliance;sequencing abilities  -SB     Impairments Affecting Function (Mobility) cognition;balance;motor planning;pain;postural/trunk control  -SB     Cognitive Impairments, Mobility Safety/Performance attention;insight into deficits/self-awareness;problem-solving/reasoning;safety precaution awareness;safety precaution follow-through;sequencing abilities  -SB               User Key  (r) = Recorded By, (t) = Taken By, (c) = Cosigned By      Initials Name Provider Type    Ashlyn Lugo PT DPT Physical Therapist                   Mobility       Row Name 07/15/24 9111          Bed Mobility    Bed Mobility supine-sit;rolling right;rolling left;scooting/bridging  -SB     Rolling Left [place] (Bed Mobility) verbal cues;moderate assist (50% patient effort)  -SB     Rolling Right [place] (Bed Mobility) verbal cues;maximum assist (25% patient effort)  -SB     Scooting/Bridging [place] (Bed Mobility) dependent (less than 25% patient effort);2 person assist;verbal cues  -SB     Assistive Device (Bed Mobility) bed rails;draw sheet;head of bed elevated  -SB     Comment, (Bed Mobility) attempted sup to sit t/f x3 reps but each time patient had onset of significant abdominal pain with palpable tender lump in R rectus abdominus area  -SB       Row Name 07/15/24 5946          Transfers    Comment, (Transfers) unsafe to perform further mobility due to cognition, drowsiness and abd pain  -SB               User Key  (r) = Recorded By, (t) = Taken By, (c) = Cosigned By      Initials Name Provider Type    Ashlyn Lugo PT DPT Physical Therapist                   Obj/Interventions       Row Name 07/15/24 0620          Range of Motion Comprehensive    General Range of Motion bilateral  lower extremity ROM WFL  -SB     Comment, General Range of Motion observed  -SB       Row Name 07/15/24 1305          Strength Comprehensive (MMT)    General Manual Muscle Testing (MMT) Assessment lower extremity strength deficits identified  -SB     Comment, General Manual Muscle Testing (MMT) Assessment unable to formally assess due to command following but observed 3+/5  -SB       Row Name 07/15/24 1305          Balance    Balance Assessment --  -SB       Row Name 07/15/24 1305          Sensory Assessment (Somatosensory)    Sensory Assessment (Somatosensory) unable/difficult to assess  -SB               User Key  (r) = Recorded By, (t) = Taken By, (c) = Cosigned By      Initials Name Provider Type    SB Ashlyn Carmona, PT DPT Physical Therapist                   Goals/Plan       Row Name 07/15/24 1305          Bed Mobility Goal 1 (PT)    Activity/Assistive Device (Bed Mobility Goal 1, PT) sit to supine;supine to sit;rolling to left;rolling to right  -SB     [place] Level/Cues Needed (Bed Mobility Goal 1, PT) contact guard required  -SB     Time Frame (Bed Mobility Goal 1, PT) long term goal (LTG)  -SB     Progress/Outcomes (Bed Mobility Goal 1, PT) new goal  -SB       Row Name 07/15/24 1305          Transfer Goal 1 (PT)    Activity/Assistive Device (Transfer Goal 1, PT) sit-to-stand/stand-to-sit;bed-to-chair/chair-to-bed  -SB     [place] Level/Cues Needed (Transfer Goal 1, PT) minimum assist (75% or more patient effort)  -SB     Time Frame (Transfer Goal 1, PT) long term goal (LTG)  -SB     Progress/Outcome (Transfer Goal 1, PT) new goal  -SB       Row Name 07/15/24 1305          Gait Training Goal 1 (PT)    Activity/Assistive Device (Gait Training Goal 1, PT) gait (walking locomotion);decrease fall risk;diminish gait deviation;improve balance and speed  -SB     [place] Level (Gait Training Goal 1, PT) minimum assist (75% or more patient effort)  -SB     Distance (Gait Training Goal 1, PT) 20  -SB      Time Frame (Gait Training Goal 1, PT) long term goal (LTG)  -SB     Progress/Outcome (Gait Training Goal 1, PT) new goal  -SB       Row Name 07/15/24 0221          Therapy Assessment/Plan (PT)    Planned Therapy Interventions (PT) balance training;strengthening;bed mobility training;gait training;patient/family education;transfer training;motor coordination training  -SB               User Key  (r) = Recorded By, (t) = Taken By, (c) = Cosigned By      Initials Name Provider Type    SB Ashlyn Carmona, PT DPT Physical Therapist                   Clinical Impression       Row Name 07/15/24 1302          Pain    Pretreatment Pain Rating 0/10 - no pain  -SB     Pain Intervention(s) Medication (See MAR);Repositioned;Ambulation/increased activity  -SB     Additional Documentation Pain Scale: Numbers Pre/Post-Treatment (Group);Pain Scale: FACES Pre/Post-Treatment (Group)  -SB       Row Name 07/15/24 8932          Pain Scale: FACES Pre/Post-Treatment    Posttreatment Pain Rating 8-->hurts whole lot  -SB     Pain Location - abdomen  -SB     Pre/Posttreatment Pain Comment pt with sig onset of pain in abdomen during attempted sup to sit t/f - nsg notified  -SB       Row Name 07/15/24 4922          Plan of Care Review    Plan of Care Reviewed With patient  -SB     Progress no change  -SB     Outcome Evaluation PT eval completed. Pt alert and oriented to person only, reports no symptoms and has expressive aphasia. Family present to assist with history and reports patient was fully independent at home prior to arrival. Today, pt follows approx 25% of one step commands with increased cues and time, but has decreased attention throughout session. Pt recently received Geodon and therefore was becoming lethargic as session progressed. Observed 3/5 strength in BLE but unable to assess formally due to cognition. Pt attempted sup to sit t/f x3 reps but each time patient had onset of significant abdominal pain with palpable tender lump  in R upper rectus abdominus area- nsg notified. Pt rolls in bed with mod-max A and is able to reach for bedrail during roll. Pt will benefit from skilled PT to improve fxl mob, safety and independence. Recommend d/c acute rehab.  -SB       Row Name 07/15/24 1306          Therapy Assessment/Plan (PT)    Patient/Family Therapy Goals Statement (PT) improve function  -SB     Rehab Potential (PT) good, to achieve stated therapy goals  -SB     Criteria for Skilled Interventions Met (PT) yes;meets criteria;skilled treatment is necessary  -SB     Therapy Frequency (PT) 2 times/day  -SB     Predicted Duration of Therapy Intervention (PT) until d/c or goals met  -SB       Row Name 07/15/24 1305          Vital Signs    O2 Delivery Pre Treatment room air  -SB       Row Name 07/15/24 1305          Positioning and Restraints    Pre-Treatment Position in bed  -SB     Post Treatment Position bed  -SB     In Bed notified nsg;side lying left;call light within reach;encouraged to call for assist;exit alarm on;with family/caregiver;pillow between legs  -SB               User Key  (r) = Recorded By, (t) = Taken By, (c) = Cosigned By      Initials Name Provider Type    Ashlyn Lugo, PT DPT Physical Therapist                   Outcome Measures       Row Name 07/15/24 1305          How much help from another person do you currently need...    Turning from your back to your side while in flat bed without using bedrails? 3  -SB     Moving from lying on back to sitting on the side of a flat bed without bedrails? 2  -SB     Moving to and from a bed to a chair (including a wheelchair)? 2  -SB     Standing up from a chair using your arms (e.g., wheelchair, bedside chair)? 2  -SB     Climbing 3-5 steps with a railing? 1  -SB     To walk in hospital room? 1  -SB     AM-PAC 6 Clicks Score (PT) 11  -SB     Highest Level of Mobility Goal 4 --> Transfer to chair/commode  -SB       Row Name 07/15/24 1305 07/15/24 1000       Functional Assessment     Outcome Measure Options AM-PAC 6 Clicks Basic Mobility (PT)  -SB AM-PAC 6 Clicks Daily Activity (OT)  -EC              User Key  (r) = Recorded By, (t) = Taken By, (c) = Cosigned By      Initials Name Provider Type    SB Ashlyn Carmona, PT DPT Physical Therapist    EC Mary [place], OTR/L Occupational Therapist                                 Physical Therapy Education       Title: PT OT SLP Therapies (In Progress)       Topic: Physical Therapy (In Progress)       Point: Mobility training (In Progress)       Learning Progress Summary             Patient Acceptance, E, NR by SB at 7/15/2024 1323    Comment: pt edu on POC, benefits of act, d/c plans   Family Acceptance, E, NR by SB at 7/15/2024 1323    Comment: pt edu on POC, benefits of act, d/c plans                         Point: Home exercise program (Not Started)       Learner Progress:  Not documented in this visit.              Point: Body mechanics (Not Started)       Learner Progress:  Not documented in this visit.              Point: Precautions (In Progress)       Learning Progress Summary             Patient Acceptance, E, NR by SB at 7/15/2024 1323    Comment: pt edu on POC, benefits of act, d/c plans   Family Acceptance, E, NR by SB at 7/15/2024 1323    Comment: pt edu on POC, benefits of act, d/c plans                                         User Key       Initials Effective Dates Name Provider Type Discipline     07/11/23 -  Ashlyn Carmona, PT DPT Physical Therapist PT                  PT Recommendation and Plan  Planned Therapy Interventions (PT): balance training, strengthening, bed mobility training, gait training, patient/family education, transfer training, motor coordination training  Plan of Care Reviewed With: patient  Progress: no change  Outcome Evaluation: PT eval completed. Pt alert and oriented to person only, reports no symptoms and has expressive aphasia. Family present to assist with history and reports patient was fully  independent at home prior to arrival. Today, pt follows approx 25% of one step commands with increased cues and time, but has decreased attention throughout session. Pt recently received Geodon and therefore was becoming lethargic as session progressed. Observed 3/5 strength in BLE but unable to assess formally due to cognition. Pt attempted sup to sit t/f x3 reps but each time patient had onset of significant abdominal pain with palpable tender lump in R upper rectus abdominus area- nsg notified. Pt rolls in bed with mod-max A and is able to reach for bedrail during roll. Pt will benefit from skilled PT to improve fxl mob, safety and independence. Recommend d/c acute rehab.     Time Calculation:         PT Charges       Row Name 07/15/24 1532             Time Calculation    Start Time 1305  -SB      Stop Time 1400  -SB      Time Calculation (min) 55 min  -SB      PT Received On 07/15/24  -SB      PT Goal Re-Cert Due Date 07/25/24  -SB                User Key  (r) = Recorded By, (t) = Taken By, (c) = Cosigned By      Initials Name Provider Type    Ashlyn Lugo, PT DPT Physical Therapist                  Therapy Charges for Today       Code Description Service Date Service Provider Modifiers Qty    14422058498 HC PT EVAL HIGH COMPLEXITY 4 7/15/2024 Ashlyn Carmona PT DPT GP 1            PT G-Codes  Outcome Measure Options: AM-PAC 6 Clicks Basic Mobility (PT)  AM-PAC 6 Clicks Score (PT): 11  AM-PAC 6 Clicks Score (OT): 14  PT Discharge Summary  Anticipated Discharge Disposition (PT): inpatient rehabilitation facility    Ashlyn Carmona PT DPT  7/15/2024

## 2024-07-15 NOTE — PLAN OF CARE
Goal Outcome Evaluation:  Plan of Care Reviewed With: patient        Progress: no change  Outcome Evaluation: PT eval completed. Pt alert and oriented to person only, reports no symptoms and has expressive aphasia. Family present to assist with history and reports patient was fully independent at home prior to arrival. Today, pt follows approx 25% of one step commands with increased cues and time, but has decreased attention throughout session. Pt recently received Geodon and therefore was becoming lethargic as session progressed. Observed 3/5 strength in BLE but unable to assess formally due to cognition. Pt attempted sup to sit t/f x3 reps but each time patient had onset of significant abdominal pain with palpable tender lump in R upper rectus abdominus area- nsg notified. Pt rolls in bed with mod-max A and is able to reach for bedrail during roll. Pt will benefit from skilled PT to improve fxl mob, safety and independence. Recommend d/c acute rehab.      Anticipated Discharge Disposition (PT): inpatient rehabilitation facility

## 2024-07-15 NOTE — PROGRESS NOTES
Neurology Progress Note      Chief Complaint:  Encephalopathy  Length of Stay:  2   Subjective     Subjective:  Seen at bedside with wife and sister, who have reported significant overnight improvement in his mental status not being able to be awake and speaking in few sentences, still very altered from his baseline.  No longer with fever    Medications:  Current Facility-Administered Medications   Medication Dose Route Frequency Provider Last Rate Last Admin    acetaminophen (TYLENOL) tablet 650 mg  650 mg Oral Q6H PRN Andrew Corrigan MD        Or    acetaminophen (TYLENOL) suppository 650 mg  650 mg Rectal Q6H PRN Andrew Corrigan MD   650 mg at 07/14/24 1403    aspirin tablet 325 mg  325 mg Oral Daily Andrew Corrigan MD        Or    aspirin suppository 300 mg  300 mg Rectal Daily Andrew Corrigan MD   300 mg at 07/13/24 1701    atorvastatin (LIPITOR) tablet 80 mg  80 mg Oral Nightly Andrew Corrigan MD        cefTRIAXone (ROCEPHIN) 1,000 mg in sodium chloride 0.9 % 100 mL MBP  1,000 mg Intravenous Q24H Leobardo Cerna  mL/hr at 07/15/24 0939 1,000 mg at 07/15/24 0939    ondansetron (ZOFRAN) injection 4 mg  4 mg Intravenous Q6H PRN Leobardo Cerna MD        sodium chloride 0.9 % flush 10 mL  10 mL Intravenous Q12H Andrew Corrigan MD   10 mL at 07/14/24 0954    sodium chloride 0.9 % flush 10 mL  10 mL Intravenous PRN Andrew Corrigan MD        sodium chloride 0.9 % infusion 40 mL  40 mL Intravenous PRN Andrew Corrigan MD        sodium chloride 0.9 % infusion  100 mL/hr Intravenous Continuous Andrew Corrigan  mL/hr at 07/15/24 0947 100 mL/hr at 07/15/24 0947    ziprasidone (GEODON) injection 10 mg  10 mg Intramuscular Q6H PRN Andrew Corrigan MD   10 mg at 07/15/24 1226             Objective      Vital Signs  Temp:  [98.1 °F (36.7 °C)-98.8 °F (37.1 °C)] 98.3 °F (36.8 °C)  Heart Rate:  [87-89] 89  Resp:  [18-22] 20  BP: (143-151)/() 151/96    Physical Exam:    No evidence of neck  stiffness  No evidence of photophobia.  The lights are on bright room and the patient does not show any concern in regards to this.  No signs that the patient is in pain nor has a headache    Pertinent Neuro Exam:  Awake.  Eyes open.  Tracks examiner around the room with his eyes.  Disregards the majority of his environment.  Follows very simple commands with coaching, unable to follow complex commands.  Minimally verbal however is able to say 2-3 word sentences.  Cranial nerves II to XII intact  Moving all extremities with great strength  No pathologic reflexes  No signs of gross ataxia      Last nurse assessment:  Interval:  (handoff with mary ann VERA)  1a. Level of Consciousness: 0-->Alert, keenly responsive  1b. LOC Questions: 2-->Answers neither question correctly  1c. LOC Commands: 2-->Performs neither task correctly  2. Best Gaze: 0-->Normal  3. Visual: 0-->No visual loss  4. Facial Palsy: 0-->Normal symmetrical movements  5a. Motor Arm, Left: 2-->Some effort against gravity, limb cannot get to or maintain (if cued) 90 (or 45) degrees, drifts down to bed, but has some effort against gravity  5b. Motor Arm, Right: 2-->Some effort against gravity, limb cannot get to or maintain (if cued) 90 (or 45) degrees, drifts down to bed, but has some effort against gravity  6a. Motor Leg, Left: 2-->Some effort against gravity, leg falls to bed by 5 secs, but has some effort against gravity  6b. Motor Leg, Right: 2-->Some effort against gravity, leg falls to bed by 5 secs, but has some effort against gravity  7. Limb Ataxia: 2-->Present in two limbs  8. Sensory: 0-->Normal, no sensory loss  9. Best Language: 3-->Mute, global aphasia, no usable speech or auditory comprehension  10. Dysarthria: 2-->Severe dysarthria, patients speech is so slurred as to be unintelligible in the absence of or out of proportion to any dysphasia, or is mute/anarthric  11. Extinction and Inattention (formerly Neglect): 0-->No abnormality    Total  (NIH Stroke Scale): 19       Results Review:      Labs:  Lumbar puncture results:  White cells 22  Red cells 67  Protein 57.3  Glucose 79  Meningitis/encephalitis panel normal  [place] Western blot pending  West Nile virus pending  Paraneoplastic panel pending  ACE level pending  Autoimmune encephalitis panel pending  NMDA receptor pending  MS panel pending  Ammonia level normal  Folate level normal  TSH level normal  B12 level normal  B1 level pending    Imaging:  Head CT without contrast reviewed by me showing no acute findings  MRI brain with and without contrast 7/14: With extensive paranasal sinus mucosal thickening, however no acute intracranial abnormality and no contrast-enhancement.  EEG 7/15: With diffuse cerebral dysfunction, but no epileptiform discharges.    Assessment/Plan     Hospital Problem List      Altered mental status    Essential hypertension    Gastroesophageal reflux disease without esophagitis    Mixed hyperlipidemia    Impression:  Altered mentation  Current testing has ruled out an infectious etiology.  No signs of a bacterial nor viral meningitis/encephalitis  Current working diagnoses is an autoimmune encephalitis      Plan:  IV methylprednisolone 1 g daily for 5 days followed by prednisone 60 mg p.o. for 2 weeks follow-up with slow taper of reducing 10 mg every 2 weeks, until reaching dose of 20 mg daily and then further steroid depending on neurology appointment.    Continue following up on lumbar puncture results  Appreciate internal medicine being the primary attending.  Please continue to trend blood glucose levels if we initiate high-dose steroids as these may elevate.      Medical Decision Making    Number/Complexity of Problems  Moderate  1 undiagnosed new problem with uncertain prognosis -   1 acute illness with systemic symptoms -   High  1 acute or chronic illness that poses a threat to life/body function -   High    MDM Data  Moderate - 1/3 categories  Extensive - 2/3  categories    Category 1: 3 of the following  Review of external notes  Review of results  Ordering of each unique test  Independent historian  Category 2:  Independent interpretation of test (ex: imaging)  Category 3:  Discussion of management with another provider    Extensive     Treatment Plan  Moderate - Prescription Drug management  High  Drug therapy requiring intensive monitoring for toxicity  Decision regarding hospitalization or escalation of care  De-escalate care/DNR decisions  Drug therapy requiring intensive monitoring (high)      Kishore Key MD  07/15/24  12:31 CDT

## 2024-07-15 NOTE — PROGRESS NOTES
[facility] Medicine Services  INPATIENT PROGRESS NOTE    Patient Name: Ty Webb  Date of Admission: 7/13/2024  Today's Date: 07/15/24  Length of Stay: 2  Primary Care Physician: Irineo Cunningham APRN    Subjective   Chief Complaint: Altered mental status.  HPI   Tmax 100 . T-current 98.3.  Blood pressure slightly high but stable, afebrile.  Patient still remain confused.  Patient does know his date of birth and home address.  Patient is oriented x 0.  Rocephin was started today due to possible sinus infection.  Plan for high-dose steroids by nephrology.    Review of Systems   Constitutional:  Positive for activity change and appetite change. Negative for chills and fever.   HENT:  Negative for hearing loss, nosebleeds, tinnitus and trouble swallowing.    Eyes:  Negative for visual disturbance.   Respiratory:  Negative for cough, chest tightness, shortness of breath and wheezing.    Cardiovascular:  Negative for chest pain, palpitations and leg swelling.   Gastrointestinal:  Negative for abdominal distention, abdominal pain, blood in stool, constipation, diarrhea, nausea and vomiting.   Endocrine: Negative for cold intolerance, heat intolerance, polydipsia, polyphagia and polyuria.   Genitourinary:  Negative for decreased urine volume, difficulty urinating, dysuria, flank pain, frequency and hematuria.   Musculoskeletal:  Positive for arthralgias, gait problem and myalgias. Negative for joint swelling.   Skin:  Negative for rash.   Allergic/Immunologic: Negative for immunocompromised state.   Neurological:  Negative for dizziness, syncope, weakness, light-headedness and headaches.   Hematological:  Negative for adenopathy. Does not bruise/bleed easily.   Psychiatric/Behavioral:  Positive for confusion. Negative for sleep disturbance. The patient is not nervous/anxious.       All pertinent negatives and positives are as above. All other systems have been reviewed and are  "negative unless otherwise stated.     Objective    Temp:  [98.1 °F (36.7 °C)-100 °F (37.8 °C)] 98.3 °F (36.8 °C)  Heart Rate:  [81-89] 89  Resp:  [18-22] 20  BP: (120-150)/() 146/61  Physical Exam  Vitals and nursing note reviewed.   HENT:      Head: Normocephalic.   Eyes:      Conjunctiva/sclera: Conjunctivae normal.      Pupils: Pupils are equal, round, and reactive to light.   Cardiovascular:      Rate and Rhythm: Normal rate and regular rhythm.      Heart sounds: Normal heart sounds.   Pulmonary:      Effort: Pulmonary effort is normal. No respiratory distress.      Breath sounds: Normal breath sounds.   Abdominal:      General: Bowel sounds are normal. There is no distension.      Palpations: Abdomen is soft.      Tenderness: There is no abdominal tenderness.   Musculoskeletal:         General: No swelling.      Cervical back: Neck supple.   Skin:     General: Skin is warm and dry.      Capillary Refill: Capillary refill takes 2 to 3 seconds.      Findings: No rash.   Neurological:      Mental Status: He is alert.      Motor: Weakness present.      Coordination: Coordination abnormal.      Gait: Gait abnormal.   Psychiatric:         Mood and Affect: Mood normal.         Behavior: Behavior normal.             Results Review:  I have reviewed the labs, radiology results, and diagnostic studies.    Laboratory Data:   Results from last 7 days   Lab Units 07/15/24  0418 07/13/24  1022   WBC 10*3/mm3 7.43 7.62   HEMOGLOBIN g/dL 16.0 15.7   HEMATOCRIT % 47.3 45.5   PLATELETS 10*3/mm3 160 160        Results from last 7 days   Lab Units 07/15/24  0418 07/13/24  1022   SODIUM mmol/L 137 135*   POTASSIUM mmol/L 4.2 4.2   CHLORIDE mmol/L 100 98   CO2 mmol/L 26.0 26.0   BUN mg/dL 18 14   CREATININE mg/dL 0.97 1.04   CALCIUM mg/dL 8.6 8.9   BILIRUBIN mg/dL  --  0.6   ALK PHOS U/L  --  66   ALT (SGPT) U/L  --  31   AST (SGOT) U/L  --  22   GLUCOSE mg/dL 110* 156*       Culture Data:   No results found for: \"BLOODCX\", " "\"URINECX\", \"WOUNDCX\", \"MRSACX\", \"RESPCX\", \"STOOLCX\"    Radiology Data:   Imaging Results (Last 24 Hours)       Procedure Component Value Units Date/Time    MRI Brain With & Without Contrast [[id number]] Collected: 07/14/24 1303     Updated: 07/14/24 1308    Narrative:      MRI BRAIN W WO CONTRAST- 7/14/2024 11:08 AM     HISTORY: encephalitis?; R41.82-Altered mental status, unspecified;  R13.10-Dysphagia, unspecified     COMPARISON: None available  TECHNIQUE: Multisequence, multiplanar MRI of the brain with and without  contrast.        FINDINGS:     There are no areas of restricted diffusion. Posterior fossa and midline  structures are unremarkable. There are no significant T2 signal  abnormalities. There is no mass effect, midline shift, or hydrocephalus.  There is no evidence for hemorrhage. Normal flow related signal voids  are noted in the major intracranial vessels. There is extensive mucosal  thickening of the maxillary sinuses, ethmoid air cells, frontal sinuses,  and sphenoid sinus. Mastoid air cells are clear. There are no areas of  pathologic contrast enhancement. There is motion artifact.          Impression:           1. Extensive paranasal sinus mucosal thickening.  2. No acute intracranial abnormality identified.  3. Motion degraded exam.     This report was signed and finalized on 7/14/2024 1:05 PM by [place].               I have reviewed the patient's current medications.     Assessment/Plan   Assessment  Active Hospital Problems    Diagnosis     **Altered mental status     Essential hypertension     Gastroesophageal reflux disease without esophagitis     Mixed hyperlipidemia        Treatment Plan  Altered mental status.  Oriented x 0..  Acute encephalopathy.  Possible autoimmune encephalitis.  IV hydration.  Neurology consult.  . Geodon as needed.  Status post lumbar puncture 7/13/2024.  No evidence infection at this time.  MRI of the brain-Extensive paranasal sinus mucosal thickening, No " acute intracranial abnormality identified.  UDS-negative.  EEG-Diffuse cerebral dysfunction of mild degree but nonspecific-  is most commonly seen due to toxic/metabolic cause, No evidence for epilepsy is seen on the study.     Sinusitis/cough.  Rocephin.  Chest x-ray-No active disease in the chest.   Patient is on room air.    Hyperlipidemia/hypertension.  Echocardiogram-ejection fraction 56 to 60%, saline test negative, no obvious valvular abnormality identified in this study.    Reflux.  Zofran as needed.    Hemoglobin A1c 6.5.  Diabetic educator.    Nutrition . N.p.o. for now due to dysphagia.    Deconditioning.  PT and OT consult.    Blood cultures pending.  West Nile is pending.  Varicella negative.  Meningitis panel-negative.  COVID-19-negative.  Flu screen-negative.    Medical Decision Making  Number and Complexity of problems: Altered mental status/sinus/hypertension/hyperlipidemia  Differential Diagnosis: None    Conditions and Status        Condition is unchanged.     MDM Data  External documents reviewed: Previous note  Cardiac tracing (EKG, telemetry) interpretation: Sinus  Radiology interpretation: MRI/EEG/chest x-ray  Labs reviewed: Laboratory  Any tests that were considered but not ordered: Lab in a.m.     Decision rules/scores evaluated (example [id number], Wells, etc): None     Discussed with: Patient      Care Planning  Shared decision making: Patient  Code status and discussions: Full code    Disposition  Social Determinants of Health that impact treatment or disposition: From home  1 to 4 days    Electronically signed by Leobardo Cerna MD, 07/15/24, 09:00 CDT.

## 2024-07-15 NOTE — THERAPY EVALUATION
Acute Care - Occupational Therapy Initial Evaluation  [facility]     Patient Name: Ty Webb  : 1965  MRN: [id number]  Today's Date: 7/15/2024     Date of Referral to OT: 24       Admit Date: 2024       ICD-10-CM ICD-9-CM   1. Altered mental status, unspecified altered mental status type  R41.82 780.97   2. Dysphagia, unspecified type  R13.10 787.20     Patient Active Problem List   Diagnosis    Essential hypertension    Gastroesophageal reflux disease without esophagitis    Mixed hyperlipidemia    Cough    Influenza B    Perineal pain    Altered mental status     Past Medical History:   Diagnosis Date    GERD (gastroesophageal reflux disease)     Hyperlipidemia     Hypertension      Past Surgical History:   Procedure Laterality Date    HERNIA REPAIR           OT ASSESSMENT FLOWSHEET (Last 12 Hours)       OT Evaluation and Treatment       Row Name 07/15/24 0938 07/15/24 0803                OT Time and Intention    Subjective Information no complaints  -EC --       Document Type evaluation  -EC --       Mode of Treatment occupational therapy  -EC --       Session Not Performed -- unable to evaluate, medical status change  -EC       Comment, Session Not Performed -- imaging  -EC          General Information    Patient Profile Reviewed yes  -EC --       Prior Level of Function independent:;all household mobility;community mobility;feeding;grooming;dressing;bathing;driving;work  -EC --       Pertinent History of Current Functional Problem presents with AMS, s/p lumbar puncture on   -EC --       Existing Precautions/Restrictions fall  expressive aphasia  -EC --       Barriers to Rehab medically complex;language barrier;cognitive status  -EC --          Living Environment    Current Living Arrangements home  tub shower  -EC --       Home Accessibility stairs to enter home;stairs within home  -EC --       People in Home spouse;child(marycruz), adult  -EC --          Home Main Entrance    Number of  Stairs, Main Entrance two  -EC --          Stairs Within Home, Primary    Number of Stairs, Within Home, Primary none  -EC --          Pain Assessment    Pretreatment Pain Rating 0/10 - no pain  -EC --       Posttreatment Pain Rating 0/10 - no pain  -EC --       Pain Intervention(s) Repositioned;Ambulation/increased activity  -EC --          Cognition    Orientation Status (Cognition) oriented to;person;unable/difficult to assess  -EC --       Follows Commands (Cognition) follows one-step commands;0-24% accuracy;delayed response/completion;increased processing time needed;initiation impaired;physical/tactile prompts required;visual cue  -EC --       Cognitive Function executive function deficit  -EC --       Executive Function Deficit (Cognition) problem-solving/reasoning;insight/awareness of deficits;initiation;severe deficit  -EC --          Range of Motion Comprehensive    Comment, General Range of Motion BUE ROM WFL  -EC --          Strength Comprehensive (MMT)    Comment, General Manual Muscle Testing (MMT) Assessment functionally at least 3+/5 but unable to follow instructions. 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  -EC --          Activities of Daily Living    BADL Assessment/Intervention lower body dressing;upper body dressing  -EC --          Upper Body Dressing Assessment/Training    [place] Level (Upper Body Dressing) upper body dressing skills;moderate assist (50% patient effort)  -EC --       Position (Upper Body Dressing) supine  -EC --       Comment, (Upper Body Dressing) hospital gown  -EC --          Lower Body Dressing Assessment/Training    [place] Level (Lower Body Dressing) don;socks;verbal cues;nonverbal cues (demo/gesture);moderate assist (50% patient effort)  -EC --       Position (Lower Body Dressing) edge of bed sitting  -EC --       Comment, (Lower Body Dressing) d/t command following  -EC --          BADL Safety/Performance    Impairments,  BADL Safety/Performance balance;cognition;endurance/activity tolerance;coordination;motor planning;strength;trunk/postural control  -EC --          Bed Mobility    Bed Mobility supine-sit  -EC --       Supine-Sit [place] (Bed Mobility) verbal cues;nonverbal cues (demo/gesture);moderate assist (50% patient effort)  -EC --       Bed Mobility, Safety Issues cognitive deficits limit understanding  -EC --       Assistive Device (Bed Mobility) head of bed elevated  -EC --          Functional Mobility    Functional Mobility- Ind. Level contact guard assist;verbal cues required;nonverbal cues required (demo/gesture)  -EC --       Functional Mobility- Device other (see comments)  HHA  -EC --       Functional Mobility- Comment a few steps to the chair  -EC --          Transfer Assessment/Treatment    Transfers sit-stand transfer;stand-sit transfer  -EC --          Sit-Stand Transfer    Sit-Stand [place] (Transfers) verbal cues;minimum assist (75% patient effort);moderate assist (50% patient effort);2 person assist  -EC --          Stand-Sit Transfer    Stand-Sit [place] (Transfers) verbal cues;contact guard  -EC --          Safety Issues, Functional Mobility    Safety Issues Affecting Function (Mobility) ability to follow commands;friction/shear risk;insight into deficits/self-awareness;judgment;problem-solving;safety precaution awareness;safety precautions follow-through/compliance;sequencing abilities  -EC --       Impairments Affecting Function (Mobility) balance;cognition;coordination;motor planning;postural/trunk control;strength  -EC --       Cognitive Impairments, Mobility Safety/Performance attention;insight into deficits/self-awareness;judgment;awareness, need for assistance;problem-solving/reasoning;safety precaution awareness;safety precaution follow-through;sequencing abilities  -EC --          Motor Skills    Motor Skills coordination  -EC --       Coordination finger to nose;bilateral;upper  "extremity;moderate impairment  -EC --          Balance    Balance Assessment sitting static balance;sitting dynamic balance;standing static balance;standing dynamic balance  -EC --       Static Sitting Balance standby assist  -EC --       Dynamic Sitting Balance standby assist  -EC --       Position, Sitting Balance unsupported;sitting edge of bed  -EC --       Static Standing Balance contact guard  -EC --       Dynamic Standing Balance minimal assist  -EC --       Position/Device Used, Standing Balance supported;other (see comments)  HHA  -EC --       Comment, Balance unsteady gait at times however no formal LOB  -EC --          Plan of Care Review    Plan of Care Reviewed With patient;spouse  -EC --       Progress no change  -EC --       Outcome Evaluation OT eval completed. Pt able to state his name but demos significant expressive aphasia. Wife at bedside and states that he was independent with ADLs, fxn mob, driving & work prior to his onset of AMS. Today he demos significantly impaired initiation & sequencing. He can follow some simple commands but often requires tactile cues to initiate bed mobility, UBD, LBD, & fxn mob. While donning socks, he did stop skilled nursing & state \"I'm just confused\" requiring assist to complete the task. Unable to perform formal MMT d/t his command following impairments however his BUE coordination seems to be moderately impaired during brief initiation of finger to nose. Pt stood with modA on first attempt and then sat back down. Pt did not follow cues to march or side step however when therapist pointed to chair & instructed him to sit there, he was able to take a few steps with HHA x2 and sit in the chair. Pt demos balance, cognitive, coordination, & motor planning deficits in which impair his safety & I with ADLs & fxn mob. He would benefit from skilled OT to address these deficits. Recommend acute rehab at this time, discussed this with pt & wife, she is agreeable to this plan.  " -EC --          Positioning and Restraints    Pre-Treatment Position in bed  -EC --       Post Treatment Position chair  -EC --       In Chair notified nsg;sitting;call light within reach;encouraged to call for assist;exit alarm on;with family/caregiver  -EC --          Therapy Assessment/Plan (OT)    Date of Referral to OT 07/13/24  -EC --       OT Diagnosis decreased ADLs  -EC --       Rehab Potential (OT) good, to achieve stated therapy goals  -EC --       Criteria for Skilled Therapeutic Interventions Met (OT) yes;meets criteria;skilled treatment is necessary  -EC --       Therapy Frequency (OT) 5 times/wk  -EC --       Predicted Duration of Therapy Intervention (OT) 10 days  -EC --       Planned Therapy Interventions (OT) activity tolerance training;adaptive equipment training;BADL retraining;cognitive/visual perception retraining;functional balance retraining;neuromuscular control/coordination retraining;occupation/activity based interventions;patient/caregiver education/training;strengthening exercise;transfer/mobility retraining;ROM/therapeutic exercise  -EC --          OT Goals    Bathing Goal Selection (OT) bathing, OT goal 1  -EC --       Toileting Goal Selection (OT) toileting, OT goal 1  -EC --       Problem Specific Goal Selection (OT) problem specific goal 1, OT  -EC --          Bathing Goal 1 (OT)    Activity/Device (Bathing Goal 1, OT) upper body bathing;lower body bathing  -EC --       [place] Level/Cues Needed (Bathing Goal 1, OT) minimum assist (75% or more patient effort)  -EC --       Time Frame (Bathing Goal 1, OT) long term goal (LTG)  -EC --       Progress/Outcomes (Bathing Goal 1, OT) new goal  -EC --          Toileting Goal 1 (OT)    Activity/Device (Toileting Goal 1, OT) adjust/manage clothing;perform perineal hygiene  -EC --       [place] Level/Cues Needed (Toileting Goal 1, OT) minimum assist (75% or more patient effort)  -EC --       Time Frame (Toileting Goal 1, OT) long  term goal (LTG)  -EC --       Progress/Outcome (Toileting Goal 1, OT) new goal  -EC --          Problem Specific Goal 1 (OT)    Problem Specific Goal 1 (OT) Pt will sequence ADL task with less than 2 vcs for increased I with fxnal activities.  -EC --       Time Frame (Problem Specific Goal 1, OT) long term goal (LTG)  -EC --       Progress/Outcome (Problem Specific Goal 1, OT) new goal  -EC --                 User Key  (r) = Recorded By, (t) = Taken By, (c) = Cosigned By      Initials Name Effective Dates    EC Mary Lyon, OTR/L 10/13/23 -                      Occupational Therapy Education       Title: PT OT SLP Therapies (In Progress)       Topic: Occupational Therapy (In Progress)       Point: ADL training (Done)       Description:   Instruct learner(s) on proper safety adaptation and remediation techniques during self care or transfers.   Instruct in proper use of assistive devices.                  Learning Progress Summary             Patient Acceptance, E, VU,NR by EC at 7/15/2024 1007   Significant Other Acceptance, E, VU,NR by EC at 7/15/2024 1007                         Point: Home exercise program (Not Started)       Description:   Instruct learner(s) on appropriate technique for monitoring, assisting and/or progressing therapeutic exercises/activities.                  Learner Progress:  Not documented in this visit.              Point: Precautions (Done)       Description:   Instruct learner(s) on prescribed precautions during self-care and functional transfers.                  Learning Progress Summary             Patient Acceptance, E, VU,NR by EC at 7/15/2024 1007   Significant Other Acceptance, E, VU,NR by EC at 7/15/2024 1007                         Point: Body mechanics (Done)       Description:   Instruct learner(s) on proper positioning and spine alignment during self-care, functional mobility activities and/or exercises.                  Learning Progress Summary             Patient  "Acceptance, E, VU,NR by  at 7/15/2024 1007   Significant Other Acceptance, E, VU,NR by  at 7/15/2024 1007                                         User Key       Initials Effective Dates Name Provider Type Discipline     10/13/23 -  Mary Lyon, OTR/L Occupational Therapist OT                      OT Recommendation and Plan  Planned Therapy Interventions (OT): activity tolerance training, adaptive equipment training, BADL retraining, cognitive/visual perception retraining, functional balance retraining, neuromuscular control/coordination retraining, occupation/activity based interventions, patient/caregiver education/training, strengthening exercise, transfer/mobility retraining, ROM/therapeutic exercise  Therapy Frequency (OT): 5 times/wk  Plan of Care Review  Plan of Care Reviewed With: patient, spouse  Progress: no change  Outcome Evaluation: OT enrico completed. Pt able to state his name but demos significant expressive aphasia. Wife at bedside and states that he was independent with ADLs, fxn mob, driving & work prior to his onset of AMS. Today he demos significantly impaired initiation & sequencing. He can follow some simple commands but often requires tactile cues to initiate bed mobility, UBD, LBD, & fxn mob. While donning socks, he did stop group home & state \"I'm just confused\" requiring assist to complete the task. Unable to perform formal MMT d/t his command following impairments however his BUE coordination seems to be moderately impaired during brief initiation of finger to nose. Pt stood with modA on first attempt and then sat back down. Pt did not follow cues to march or side step however when therapist pointed to chair & instructed him to sit there, he was able to take a few steps with HHA x2 and sit in the chair. Pt demos balance, cognitive, coordination, & motor planning deficits in which impair his safety & I with ADLs & fxn mob. He would benefit from skilled OT to address these deficits. " "Recommend acute rehab at this time, discussed this with pt & wife, she is agreeable to this plan.  Plan of Care Reviewed With: patient, spouse  Outcome Evaluation: OT enrico completed. Pt able to state his name but demos significant expressive aphasia. Wife at bedside and states that he was independent with ADLs, fxn mob, driving & work prior to his onset of AMS. Today he demos significantly impaired initiation & sequencing. He can follow some simple commands but often requires tactile cues to initiate bed mobility, UBD, LBD, & fxn mob. While donning socks, he did stop group home & state \"I'm just confused\" requiring assist to complete the task. Unable to perform formal MMT d/t his command following impairments however his BUE coordination seems to be moderately impaired during brief initiation of finger to nose. Pt stood with modA on first attempt and then sat back down. Pt did not follow cues to march or side step however when therapist pointed to chair & instructed him to sit there, he was able to take a few steps with HHA x2 and sit in the chair. Pt demos balance, cognitive, coordination, & motor planning deficits in which impair his safety & I with ADLs & fxn mob. He would benefit from skilled OT to address these deficits. Recommend acute rehab at this time, discussed this with pt & wife, she is agreeable to this plan.     Outcome Measures       Row Name 07/15/24 1000             How much help from another is currently needed...    Putting on and taking off regular lower body clothing? 2  -EC      Bathing (including washing, rinsing, and drying) 2  -EC      Toileting (which includes using toilet bed pan or urinal) 2  -EC      Putting on and taking off regular upper body clothing 2  -EC      Taking care of personal grooming (such as brushing teeth) 3  -EC      Eating meals 3  -EC      AM-PAC 6 Clicks Score (OT) 14  -EC         Functional Assessment    Outcome Measure Options AM-PAC 6 Clicks Daily Activity (OT)  -EC   "              User Key  (r) = Recorded By, (t) = Taken By, (c) = Cosigned By      Initials Name Provider Type    EC Mary Lyon, OTR/L Occupational Therapist                    Time Calculation:    Time Calculation- OT       Row Name 07/15/24 1010             Time Calculation- OT    OT Start Time 0938  +13 min CR  -EC      OT Stop Time 1009  -EC      OT Time Calculation (min) 31 min  -EC      OT Received On 07/15/24  -EC      OT Goal Re-Cert Due Date 07/25/24  -EC         Untimed Charges    OT Eval/Re-eval Minutes 44  -EC         Total Minutes    Untimed Charges Total Minutes 44  -EC       Total Minutes 44  -EC                User Key  (r) = Recorded By, (t) = Taken By, (c) = Cosigned By      Initials Name Provider Type    EC Mary Lyon, OTR/L Occupational Therapist                  Therapy Charges for Today       Code Description Service Date Service Provider Modifiers Qty    [id number] HC OT EVAL MOD COMPLEXITY 3 7/15/2024 Mary Lyon OTR/L GO 1                 Mary Lyon OTR/L  7/15/2024

## 2024-07-15 NOTE — NURSING NOTE
Telemetry notified of the patient's leads coming off again. This nurse and NT went together to get new vitals, reposition, replace telemetry leads, perform brief change/kenny care to find patient had removed all telemetry leads and was highly agitated. Patient treated for agitation per MAR at this time. Needs in the room addressed at this time.

## 2024-07-15 NOTE — THERAPY TREATMENT NOTE
Acute Care - Speech Language Pathology   Swallow Treatment Note [facility]     Patient Name: Ty Webb  : 1965  MRN: [id number]  Today's Date: 7/15/2024               Admit Date: 2024  Swallow treatment completed. Wife present and reports patient is improved from prior day. He is alert and speaking but does continue with expressive aphasia. He is confused and easily agitated and trying to get out of bed. RN present and aware. He completed a full range of consistencies excluding soft to chew foods. No overt s/s of aspiration observed. Functional rotary chew given regular solids.     OK to begin regular diet with thin liquids.   Medications as tolerated.   Oral care and at least intermittent supervision with meals.   If decreased alertness or increased confusion please hold PO at that time.     SLP will continue to follow and treat. Speech/Language/Cognitive evaluation is warranted upon follow up with patient.   Evy Montalvo MS CCC-SLP 7/15/2024 12:58 CDT    Visit Dx:     ICD-10-CM ICD-9-CM   1. Altered mental status, unspecified altered mental status type  R41.82 780.97   2. Dysphagia, unspecified type  R13.10 787.20     Patient Active Problem List   Diagnosis    Essential hypertension    Gastroesophageal reflux disease without esophagitis    Mixed hyperlipidemia    Cough    Influenza B    Perineal pain    Altered mental status     Past Medical History:   Diagnosis Date    GERD (gastroesophageal reflux disease)     Hyperlipidemia     Hypertension      Past Surgical History:   Procedure Laterality Date    HERNIA REPAIR         SLP Recommendation and Plan     SLP Diet Recommendation: regular textures, thin liquids (07/15/24 1218)     SLP Rec. for Method of Medication Administration: meds whole, as tolerated (07/15/24 1218)                                   Daily Summary of Progress (SLP): progress toward functional goals is gradual (07/15/24 121)               Treatment Assessment (SLP): improved  (07/15/24 1218)  Treatment Assessment Comments (SLP): See note (07/15/24 1218)  Plan for Continued Treatment (SLP): goals adjusted to reflect functional improvements demonstrated (07/15/24 1218)         Plan of Care Reviewed With: patient, spouse, caregiver (JODY Downing)  Progress: improving      SWALLOW EVALUATION (Last 72 Hours)       SLP Adult Swallow Evaluation       Row Name 07/15/24 1218 07/15/24 0740 07/14/24 1125 07/13/24 1425          Rehab Evaluation    Document Type therapy note (daily note)  -MG therapy note (daily note)  -MG therapy note (daily note)  -CS evaluation  -CS     Subjective Information no complaints  -MG -- no complaints  -CS no complaints  -CS     Patient Observations alert;cooperative;agree to therapy  -MG -- alert;poorly cooperative  -CS alert;poorly cooperative  -CS     Patient/Family/Caregiver Comments/Observations Wife present.  -MG -- Wife present  -CS Son present  -CS     Session Not Performed -- unable to treat, medical status change  -MG -- --     Patient Effort good  -MG -- poor  -CS poor  -CS     Comment -- EEG  -MG -- --     Symptoms Noted During/After Treatment none  -MG -- -- --        General Information    Patient Profile Reviewed -- -- -- yes  -CS     Pertinent History Of Current Problem -- -- -- AMS, CT of head- No acute intracranial abnormality. Lumbar puncture complete.  -CS     Current Method of Nutrition -- -- -- NPO  -CS     Precautions/Limitations, Vision -- -- -- WFL;for purposes of eval  -CS     Precautions/Limitations, Hearing -- -- -- WFL;for purposes of eval  -CS     Prior Level of Function-Communication -- -- -- WFL  -CS     Prior Level of Function-Swallowing -- -- -- no diet consistency restrictions  -CS     Plans/Goals Discussed with -- -- -- patient and family  -CS     Barriers to Rehab -- -- -- cognitive status  -CS     Patient's Goals for Discharge -- -- -- patient did not state  -CS     Family Goals for Discharge -- -- -- family did not state  -CS         Pain    Additional Documentation Pain Scale: FACES Pre/Post-Treatment (Group)  -MG -- Pain Scale: FACES Pre/Post-Treatment (Group)  -CS Pain Scale: FACES Pre/Post-Treatment (Group)  -CS        Pain Scale: FACES Pre/Post-Treatment    Pain: FACES Scale, Pretreatment 0-->no hurt  -MG -- 0-->no hurt  -CS 0-->no hurt  -CS     Posttreatment Pain Rating 0-->no hurt  -MG -- 0-->no hurt  -CS 0-->no hurt  -CS        Oral Motor Structure and Function    Dentition Assessment -- -- -- natural, present and adequate  -CS     Secretion Management -- -- -- WNL/WFL  -CS     Mucosal Quality -- -- -- other (see comments)  CNA  -CS     Volitional Swallow -- -- -- unable to elicit  -CS     Volitional Cough -- -- -- unable to elicit  -CS        Oral Musculature and Cranial Nerve Assessment    Oral Motor General Assessment -- -- -- unable to assess  -CS        General Eating/Swallowing Observations    Respiratory Support Currently in Use -- -- -- room air  -CS        Clinical Swallow Eval    Clinical Swallow Evaluation Summary -- -- -- See note  -CS        SLP Evaluation Clinical Impression    SLP Swallowing Diagnosis -- -- -- severe;oral dysphagia;suspected pharyngeal dysphagia;R/O pharyngeal dysphagia  -CS     Functional Impact -- -- -- risk of aspiration/pneumonia;risk of malnutrition  -CS     Rehab Potential/Prognosis, Swallowing -- -- -- adequate, monitor progress closely  -CS     Swallow Criteria for Skilled Therapeutic Interventions Met -- -- -- demonstrates skilled criteria  -CS        SLP Treatment Clinical Impressions    Treatment Assessment (SLP) improved  -MG -- continued  -CS --     Treatment Assessment Comments (SLP) See note  -MG -- -- --     Daily Summary of Progress (SLP) progress toward functional goals is gradual  -MG -- progress toward functional goals is gradual  -CS --     Barriers to Overall Progress (SLP) Cognitive status  -MG -- Cognitive status  -CS --     Plan for Continued Treatment (SLP) goals adjusted to  reflect functional improvements demonstrated  -MG -- continue treatment per plan of care  -CS --     Care Plan Review care plan/treatment goals reviewed;risks/benefits reviewed;current/potential barriers reviewed;patient/other agree to care plan  -MG -- care plan/treatment goals reviewed  -CS --     Care Plan Review, Other Participant(s) caregiver  JODY Chang  -MG -- spouse  -CS --        Recommendations    Therapy Frequency (Swallow) -- -- -- at least;3 days per week  -CS     Predicted Duration Therapy Intervention (Days) -- -- -- until discharge  -CS     SLP Diet Recommendation regular textures;thin liquids  -MG -- -- NPO  -CS     Oral Care Recommendations -- -- -- Oral Care BID/PRN  -CS     SLP Rec. for Method of Medication Administration meds whole;as tolerated  -MG -- -- meds via alternate route  -CS     Monitor for Signs of Aspiration -- -- -- yes;notify SLP if any concerns  -CS     Anticipated Discharge Disposition (SLP) -- -- -- unknown  -CS        Swallow Goals (SLP)    Swallow LTGs Swallow Long Term Goal (free text)  -MG -- Swallow Long Term Goal (free text)  -CS Swallow Long Term Goal (free text)  -CS     Swallow STGs diet tolerance goal selection (SLP)  -MG -- diet tolerance goal selection (SLP)  -CS diet tolerance goal selection (SLP)  -CS     Diet Tolerance Goal Selection (SLP) Patient will tolerate trials of  -MG -- Patient will tolerate trials of  -CS Patient will tolerate trials of  -CS        (LTG) Swallow    (LTG) Swallow Pt will tolerate LRD w/o any overt s/s of aspiration.  -MG -- Pt will tolerate LRD w/o any overt s/s of aspiration.  -CS Pt will tolerate LRD w/o any overt s/s of aspiration.  -CS     [place] (Swallow Long Term Goal) with minimal cues (75-90% accuracy)  -MG -- with minimal cues (75-90% accuracy)  -CS with minimal cues (75-90% accuracy)  -CS     Time Frame (Swallow Long Term Goal) by discharge  -MG -- by discharge  -CS by discharge  -CS     Barriers (Swallow Long Term Goal)  cog status  -MG -- cog status  -CS cog status  -CS     Progress/Outcomes (Swallow Long Term Goal) continuing progress toward goal  -MG -- goal ongoing  -CS new goal  -CS        (STG) Patient will tolerate trials of    Consistencies Trialed (Tolerate trials) regular textures;pureed textures;thin liquids;nectar/ mildly thick liquids;honey/ moderately thick liquids  -MG -- regular textures;pureed textures;thin liquids;nectar/ mildly thick liquids;honey/ moderately thick liquids  -CS regular textures;pureed textures;thin liquids;nectar/ mildly thick liquids;honey/ moderately thick liquids  -CS     Desired Outcome (Tolerate trials) without signs/symptoms of aspiration;without signs of distress  -MG -- without signs/symptoms of aspiration;without signs of distress  -CS without signs/symptoms of aspiration;without signs of distress  -CS     [place] (Tolerate trials) with minimal cues (75-90% accuracy)  -MG -- with minimal cues (75-90% accuracy)  -CS with minimal cues (75-90% accuracy)  -CS     Time Frame (Tolerate trials) by discharge  -MG -- by discharge  -CS by discharge  -CS     Progress/Outcomes (Tolerate trials) continuing progress toward goal  -MG -- goal ongoing  -CS new goal  -CS               User Key  (r) = Recorded By, (t) = Taken By, (c) = Cosigned By      Initials Name Effective Dates    CS Pee Michaud, [facility]-SLP 05/07/24 -     MG Evy Montalvo, MS [facility]-SLP 07/11/23 -                     EDUCATION  The patient has been educated in the following areas:   Dysphagia (Swallowing Impairment) Oral Care/Hydration.        SLP GOALS       Row Name 07/15/24 1218 07/14/24 1125 07/13/24 1425       (LTG) Swallow    (LTG) Swallow Pt will tolerate LRD w/o any overt s/s of aspiration.  -MG Pt will tolerate LRD w/o any overt s/s of aspiration.  -CS Pt will tolerate LRD w/o any overt s/s of aspiration.  -CS    [place] (Swallow Long Term Goal) with minimal cues (75-90% accuracy)  -MG with minimal cues (75-90%  accuracy)  -CS with minimal cues (75-90% accuracy)  -CS    Time Frame (Swallow Long Term Goal) by discharge  -MG by discharge  -CS by discharge  -CS    Barriers (Swallow Long Term Goal) cog status  -MG cog status  -CS cog status  -CS    Progress/Outcomes (Swallow Long Term Goal) continuing progress toward goal  -MG goal ongoing  -CS new goal  -CS       (STG) Patient will tolerate trials of    Consistencies Trialed (Tolerate trials) regular textures;pureed textures;thin liquids;nectar/ mildly thick liquids;honey/ moderately thick liquids  -MG regular textures;pureed textures;thin liquids;nectar/ mildly thick liquids;honey/ moderately thick liquids  -CS regular textures;pureed textures;thin liquids;nectar/ mildly thick liquids;honey/ moderately thick liquids  -CS    Desired Outcome (Tolerate trials) without signs/symptoms of aspiration;without signs of distress  -MG without signs/symptoms of aspiration;without signs of distress  -CS without signs/symptoms of aspiration;without signs of distress  -CS    [place] (Tolerate trials) with minimal cues (75-90% accuracy)  -MG with minimal cues (75-90% accuracy)  -CS with minimal cues (75-90% accuracy)  -CS    Time Frame (Tolerate trials) by discharge  -MG by discharge  -CS by discharge  -CS    Progress/Outcomes (Tolerate trials) continuing progress toward goal  -MG goal ongoing  -CS new goal  -CS              User Key  (r) = Recorded By, (t) = Taken By, (c) = Cosigned By      Initials Name Provider Type    CS Pee Michaud, [facility]SLP Speech and Language Pathologist    MG Evy Montalvo, MS CCC-SLP Speech and Language Pathologist                         Time Calculation:    Time Calculation- SLP       Row Name 07/15/24 1257             Time Calculation- SLP    SLP Start Time 1218  -MG      SLP Stop Time 1257  -MG      SLP Time Calculation (min) 39 min  -MG      SLP Received On 07/15/24  -MG         Untimed Charges    46154-MK Treatment Swallow Minutes 39  -MG          Total Minutes    Untimed Charges Total Minutes 39  -MG       Total Minutes 39  -MG                User Key  (r) = Recorded By, (t) = Taken By, (c) = Cosigned By      Initials Name Provider Type    Evy Ron, MS CCC-SLP Speech and Language Pathologist                    Therapy Charges for Today       Code Description Service Date Service Provider Modifiers Qty    [id number]  ST TREATMENT SWALLOW 3 7/15/2024 Evy Montalvo MS CCC-SLP GN 1                 Evy Montalvo MS CCC-SLP  7/15/2024

## 2024-07-15 NOTE — PLAN OF CARE
"Goal Outcome Evaluation:  Plan of Care Reviewed With: patient, spouse        Progress: no change  Outcome Evaluation: OT enrico completed. Pt able to state his name but demos significant expressive aphasia. Wife at bedside and states that he was independent with ADLs, fxn mob, driving & work prior to his onset of AMS. Today he demos significantly impaired initiation & sequencing. He can follow some simple commands but often requires tactile cues to initiate bed mobility, UBD, LBD, & fxn mob. While donning socks, he did stop USP & state \"I'm just confused\" requiring assist to complete the task. Unable to perform formal MMT d/t his command following impairments however his BUE coordination seems to be moderately impaired during brief initiation of finger to nose. Pt stood with modA on first attempt and then sat back down. Pt did not follow cues to march or side step however when therapist pointed to chair & instructed him to sit there, he was able to take a few steps with HHA x2 and sit in the chair. Pt demos balance, cognitive, coordination, & motor planning deficits in which impair his safety & I with ADLs & fxn mob. He would benefit from skilled OT to address these deficits. Recommend acute rehab at this time, discussed this with pt & wife, she is agreeable to this plan.                               "

## 2024-07-15 NOTE — PAYOR COMM NOTE
"Joey Argueta (58 y.o. Male) [id number]   ADMIT 07/013 [facility]  [place] [phone], [phone]      Date of Birth   1965    Social Security Number       Address   [address] [place] [address] [address] [place] [address]    Home Phone   [phone]    MRN   [id number]       Jain   Quaker    Marital Status                               Admission Date   7/13/24    Admission Type   Emergency    Admitting Provider   Leobardo Cerna MD    Attending Provider   Leobardo Cerna MD    Department, Room/Bed   [facility] 3A, 353/1       Discharge Date       Discharge Disposition       Discharge Destination                                 Attending Provider: Leobardo Cerna MD    Allergies: No Known Allergies    Isolation: None   Infection: None   Code Status: CPR    Ht: 203.2 cm (80\")   Wt: 122 kg (270 lb)    Admission Cmt: None   Principal Problem: Altered mental status [R41.82]                   Active Insurance as of 7/13/2024       Primary Coverage       Payor Plan Insurance Group Employer/Plan Group    [employer] PPO [id number]       Payor Plan Address Payor Plan Phone Number Payor Plan Fax Number Effective Dates    [address] [phone]  1/1/2019 - None Entered    [place] [id number]         Subscriber Name Subscriber Birth Date Member ID       JOEY ARGUETA 1965 [id number]                     Emergency Contacts        (Rel.) Home Phone Work Phone Mobile Phone    Jennie Argueta (Spouse) [phone] -- [phone]                 History & Physical        Andrew Corrigan MD at 07/13/24 [address] [place] [address] Medicine Services  HISTORY AND PHYSICAL    Date of Admission: 7/13/2024  Primary Care Physician: Irineo Cunningham, APRN    Subjective   Primary Historian: Patient's wife    Chief Complaint: Altered mental status    History of Present Illness  This is a 58-year-old gentleman with past medical " history significant for hypertension, hyperlipidemia and GERD, who presented to the emergency department earlier today with altered mental status that started earlier this morning.  The last known normal time was around 9 PM last night when the wife was able to talk to him and he answered correctly.  Patient presented to the ER with increased confusion initially was able to recognize his name and his birthday but later he became more forgetful and aphasic there was no slurred speech there was no facial droop there was no loss of consciousness or urinary or fecal incontinence no fever no chills of note patient had an upper respiratory tract infection approximately about 2 weeks ago with nonproductive cough.  There was no history of sick contacts.  Patient was brought into the emergency department where he was subsequently admitted imaging studies were obtained neurology was called, patient had lumbar puncture done at the emergency department.  He will be admitted for further evaluation and management.        Review of Systems   Otherwise complete ROS reviewed and negative except as mentioned in the HPI.    Past Medical History:   Past Medical History:   Diagnosis Date    GERD (gastroesophageal reflux disease)     Hyperlipidemia     Hypertension      Past Surgical History:  Past Surgical History:   Procedure Laterality Date    HERNIA REPAIR       Social History:  reports that he has never smoked. He has never used smokeless tobacco. He reports that he does not drink alcohol and does not use drugs.    Family History: family history includes Bone cancer in his mother; Breast cancer in his mother and sister.       Allergies:  No Known Allergies    Medications:  Prior to Admission medications    Medication Sig Start Date End Date Taking? Authorizing Provider   albuterol sulfate  (90 Base) MCG/ACT inhaler Inhale 2 puffs. 11/28/20   Provider, MD Catina   atorvastatin (LIPITOR) 10 MG tablet TAKE 1 TABLET BY MOUTH  "EVERY DAY 4/19/23   Meng Augustin MD   carvedilol (COREG) 25 MG tablet TAKE 1 TABLET BY MOUTH 2 (TWO) TIMES A DAY WITH MEALS. PLEASE CONTACT OFFICE FOR AN APPOINTMENT FOR FURTHER REFILLS 4/15/24   Meng Augustin MD   citalopram (CeleXA) 20 MG tablet Take 20 mg by mouth. 11/28/20   Provider, MD Catina   esomeprazole (nexIUM) 40 MG capsule TAKE 1 CAPSULE DAILY       *PERRIGO* 10/29/19   Meng Augustin MD   ketorolac (TORADOL) 10 MG tablet Take 1 tablet by mouth Every 6 (Six) Hours As Needed for Moderate Pain . 2/14/22   Meng Augustin MD   lisinopril (PRINIVIL,ZESTRIL) 10 MG tablet Take 1 tablet by mouth Daily. 5/28/24   Meng Augustin MD     I have utilized all available immediate resources to obtain, update, or review the patient's current medications (including all prescriptions, over-the-counter products, herbals, cannabis/cannabidiol products, and vitamin/mineral/dietary (nutritional) supplements).    Objective     Vital Signs: /93   Pulse 55   Temp 97.6 °F (36.4 °C)   Resp 18   Ht 203.2 cm (80\")   Wt 122 kg (270 lb)   SpO2 98%   BMI 29.66 kg/m²   Physical Exam   General: Patient is alert and awake, aphasic, does not follow commands.  HEENT: Normocephalic, atraumatic, pupils are equal reactive to light and accommodate.  Neck: Supple, no JVD, no carotid bruits  CVS: S1, S2, regular rhythm and rate  Lungs: Clear to auscultation bilaterally  Abdomen: Soft, nontender, nondistended bowel sounds are present  Extremities: No cyanosis, no clubbing, no edema pulses are intact  Neurologic: Patient is nonverbal.  Does not follow commands.  Appears to be confused.      Results Reviewed:  Lab Results (last 24 hours)       Procedure Component Value Units Date/Time    N-methyl-D-Aspartate Recpt,CSF - Cerebrospinal Fluid, Lumbar Puncture [[id number]] Collected: 07/13/24 143    Specimen: Cerebrospinal Fluid from Lumbar Puncture Updated: 07/13/24 6144    Protime-INR " [[id number]]  (Normal) Collected: 07/13/24 1022    Specimen: Blood Updated: 07/13/24 1418     Protime 13.5 Seconds      INR 1.00    Urinalysis With Culture If Indicated - Straight Cath [[id number]]  (Abnormal) Collected: 07/13/24 1107    Specimen: Urine from Straight Cath Updated: 07/13/24 1139     Color, UA Dark Yellow     Appearance, UA Clear     pH, UA 6.0     Specific Gravity, UA >1.030     Glucose, UA Negative     Ketones, UA 15 mg/dL (1+)     Bilirubin, UA Negative     Blood, UA Negative     Protein, UA Negative     Leuk Esterase, UA Trace     Nitrite, UA Negative     Urobilinogen, UA 1.0 E.U./dL    Narrative:      In absence of clinical symptoms, the presence of pyuria, bacteria, and/or nitrites on the urinalysis result does not correlate with infection.    Urinalysis, Microscopic Only - Straight Cath [[id number]] Collected: 07/13/24 1107    Specimen: Urine from Straight Cath Updated: 07/13/24 1139     RBC, UA 0-2 /HPF      WBC, UA 0-2 /HPF      Comment: Urine culture not indicated.        Bacteria, UA None Seen /HPF      Squamous Epithelial Cells, UA 0-2 /HPF      Hyaline Casts, UA 0-2 /LPF      Methodology Automated Microscopy    Fentanyl, Urine - Straight Cath [[id number]]  (Normal) Collected: 07/13/24 1107    Specimen: Urine from Straight Cath Updated: 07/13/24 1132     Fentanyl, Urine Negative    Narrative:      Negative Threshold:      Fentanyl 5 ng/mL     The normal value for the drug tested is negative. This report includes final unconfirmed screening results to be used for medical treatment purposes only. Unconfirmed results must not be used for non-medical purposes such as employment or legal testing. Clinical consideration should be applied to any drug of abuse test, particularly when unconfirmed results are used.           Urine Drug Screen - Straight Cath [[id number]]  (Normal) Collected: 07/13/24 1107    Specimen: Urine from Straight Cath Updated: 07/13/24 1131     THC, Screen, Urine Negative      Phencyclidine (PCP), Urine Negative     Cocaine Screen, Urine Negative     Methamphetamine, Ur Negative     Opiate Screen Negative     Amphetamine Screen, Urine Negative     Benzodiazepine Screen, Urine Negative     Tricyclic Antidepressants Screen Negative     Methadone Screen, Urine Negative     Barbiturates Screen, Urine Negative     Oxycodone Screen, Urine Negative     Buprenorphine, Screen, Urine Negative    Narrative:      Cutoff For Drugs Screened:    Amphetamines               500 ng/ml  Barbiturates               200 ng/ml  Benzodiazepines            150 ng/ml  Cocaine                    150 ng/ml  Methadone                  200 ng/ml  Opiates                    100 ng/ml  Phencyclidine               25 ng/ml  THC                         50 ng/ml  Methamphetamine            500 ng/ml  Tricyclic Antidepressants  300 ng/ml  Oxycodone                  100 ng/ml  Buprenorphine               10 ng/ml    The normal value for all drugs tested is negative. This report includes unconfirmed screening results, with the cutoff values listed, to be used for medical treatment purposes only.  Unconfirmed results must not be used for non-medical purposes such as employment or legal testing.  Clinical consideration should be applied to any drug of abuse test, particularly when unconfirmed results are used.      COVID-19 and FLU A/B PCR, 1 HR TAT - Swab, Nasopharynx [[id number]]  (Normal) Collected: 07/13/24 1057    Specimen: Swab from Nasopharynx Updated: 07/13/24 1122     COVID19 Not Detected     Influenza A PCR Not Detected     Influenza B PCR Not Detected    Narrative:      Fact sheet for providers: https://www.fda.gov/media/940148/download    Fact sheet for patients: https://www.fda.gov/media/612776/download    Test performed by PCR.    Magnesium [[id number]]  (Normal) Collected: 07/13/24 1022    Specimen: Blood Updated: 07/13/24 1047     Magnesium 2.2 mg/dL     Comprehensive Metabolic Panel [[id number]]  (Abnormal)  Collected: 07/13/24 1022    Specimen: Blood Updated: 07/13/24 1047     Glucose 156 mg/dL      BUN 14 mg/dL      Creatinine 1.04 mg/dL      Sodium 135 mmol/L      Potassium 4.2 mmol/L      Chloride 98 mmol/L      CO2 26.0 mmol/L      Calcium 8.9 mg/dL      Total Protein 7.3 g/dL      Albumin 4.2 g/dL      ALT (SGPT) 31 U/L      AST (SGOT) 22 U/L      Alkaline Phosphatase 66 U/L      Total Bilirubin 0.6 mg/dL      Globulin 3.1 gm/dL      A/G Ratio 1.4 g/dL      BUN/Creatinine Ratio 13.5     Anion Gap 11.0 mmol/L      eGFR 82.2 mL/min/1.73     Narrative:      GFR Normal >60  Chronic Kidney Disease <60  Kidney Failure <15      CBC & Differential [[id number]]  (Abnormal) Collected: 07/13/24 1022    Specimen: Blood Updated: 07/13/24 1034    Narrative:      The following orders were created for panel order CBC & Differential.  Procedure                               Abnormality         Status                     ---------                               -----------         ------                     CBC Auto Differential[[id number]]        Abnormal            Final result                 Please view results for these tests on the individual orders.    CBC Auto Differential [[id number]]  (Abnormal) Collected: 07/13/24 1022    Specimen: Blood Updated: 07/13/24 1034     WBC 7.62 10*3/mm3      RBC 5.28 10*6/mm3      Hemoglobin 15.7 g/dL      Hematocrit 45.5 %      MCV 86.2 fL      MCH 29.7 pg      MCHC 34.5 g/dL      RDW 13.0 %      RDW-SD 41.1 fl      MPV 10.5 fL      Platelets 160 10*3/mm3      Neutrophil % 74.3 %      Lymphocyte % 19.9 %      Monocyte % 4.7 %      Eosinophil % 0.5 %      Basophil % 0.1 %      Immature Grans % 0.5 %      Neutrophils, Absolute 5.65 10*3/mm3      Lymphocytes, Absolute 1.52 10*3/mm3      Monocytes, Absolute 0.36 10*3/mm3      Eosinophils, Absolute 0.04 10*3/mm3      Basophils, Absolute 0.01 10*3/mm3      Immature Grans, Absolute 0.04 10*3/mm3      nRBC 0.0 /100 WBC     [place] Draw [[id number]]  Collected: 07/13/24 1022    Specimen: Blood Updated: 07/13/24 1031    Narrative:      The following orders were created for panel order [place] Draw.  Procedure                               Abnormality         Status                     ---------                               -----------         ------                     Green Top (Gel)[[id number]]                                  Final result               Lavender Top[[id number]]                                     Final result               Red Top[[id number]]                                          Final result               Ramirez Top[[id number]]                                         Final result               Light Blue Top[[id number]]                                   Final result                 Please view results for these tests on the individual orders.    Green Top (Gel) [[id number]] Collected: 07/13/24 1022    Specimen: Blood Updated: 07/13/24 1031     Extra Tube Hold for add-ons.     Comment: Auto resulted.       Lavender Top [[id number]] Collected: 07/13/24 1022    Specimen: Blood Updated: 07/13/24 1031     Extra Tube hold for add-on     Comment: Auto resulted       Red Top [[id number]] Collected: 07/13/24 1022    Specimen: Blood Updated: 07/13/24 1031     Extra Tube Hold for add-ons.     Comment: Auto resulted.       Gray Top [[id number]] Collected: 07/13/24 1022    Specimen: Blood Updated: 07/13/24 1031     Extra Tube Hold for add-ons.     Comment: Auto resulted.       Light Blue Top [[id number]] Collected: 07/13/24 1022    Specimen: Blood Updated: 07/13/24 1031     Extra Tube Hold for add-ons.     Comment: Auto resulted       POC Glucose Once [[id number]]  (Abnormal) Collected: 07/13/24 1016    Specimen: Blood Updated: 07/13/24 1029     Glucose 139 mg/dL      Comment: : [id number] Ambrocio Cruz ID: [id number]             Imaging Results (Last 24 Hours)       Procedure Component Value Units Date/Time    CT Angiogram Head w AI Analysis of LVO  [[id number]] Collected: 07/13/24 1109     Updated: 07/13/24 1115    Narrative:      EXAM: CT ANGIOGRAM HEAD W AI ANALYSIS OF LVO-, CT ANGIOGRAM NECK- -  7/13/2024 9:40 AM     HISTORY: workup for stroke; altered, not a code stroke       COMPARISON: None available.     DOSE LENGTH PRODUCT: 1577.03 mGy.cm (accession [id number]). Automated  exposure control was also utilized to decrease patient radiation dose.     TECHNIQUE: CTA head and neck performed with intravenous contrast. 3D  postprocessing, including MIPs, performed and images saved to PACS. AI  ANALYSIS OF LVO WAS UTILIZED.  Grading of carotid stenosis performed  with NASCET criteria.     FINDINGS:     CTA HEAD: Distal ICAs are patent and without flow-limiting stenosis. No  intracranial large vessel occlusion. Anterior and middle cerebral  arteries are patent and without flow-limiting stenosis. Intracranial  vertebral arteries and basilar artery are normal in caliber. Posterior  cerebral arteries and poterior circulation are patent and normal in  caliber. No visualized aneurysm or vascular malformation. The dural  venous sinuses are unremarkable with no filling defect.      CTA NECK: Great vessels originate normally from the aortic arch.. Common  carotid arteries are patent and without flow-limiting stenosis. The  bilateral internal carotid arteries are patent without stenosis or  occlusion. The distal right internal carotid artery is torturous. The  vertebral arteries are opacified without significant ostial narrowing or  stenosis. The right vertebral artery is dominant. No evidence of  vertebral artery dissection or pseudoaneurysm.     The submitted soft tissues of the neck are unremarkable..  Lung apices are clear. There is mucosal thickening of the frontal  sinuses, ethmoid air cells, sphenoid sinus, and bilateral maxillary  sinuses. The mastoid air cells are clear.       Impression:         1. No arterial occlusion or flow-limiting stenosis in the neck.  2.  No intracranial large vessel occlusion or flow-limiting stenosis.     This report was signed and finalized on 7/13/2024 11:12 AM by Glen Ace.       CT Angiogram Neck [[id number]] Collected: 07/13/24 1109     Updated: 07/13/24 1115    Narrative:      EXAM: CT ANGIOGRAM HEAD W AI ANALYSIS OF LVO-, CT ANGIOGRAM NECK- -  7/13/2024 9:40 AM     HISTORY: workup for stroke; altered, not a code stroke       COMPARISON: None available.     DOSE LENGTH PRODUCT: 1577.03 mGy.cm (accession [id number]). Automated  exposure control was also utilized to decrease patient radiation dose.     TECHNIQUE: CTA head and neck performed with intravenous contrast. 3D  postprocessing, including MIPs, performed and images saved to PACS. AI  ANALYSIS OF LVO WAS UTILIZED.  Grading of carotid stenosis performed  with NASCET criteria.     FINDINGS:     CTA HEAD: Distal ICAs are patent and without flow-limiting stenosis. No  intracranial large vessel occlusion. Anterior and middle cerebral  arteries are patent and without flow-limiting stenosis. Intracranial  vertebral arteries and basilar artery are normal in caliber. Posterior  cerebral arteries and poterior circulation are patent and normal in  caliber. No visualized aneurysm or vascular malformation. The dural  venous sinuses are unremarkable with no filling defect.      CTA NECK: Great vessels originate normally from the aortic arch.. Common  carotid arteries are patent and without flow-limiting stenosis. The  bilateral internal carotid arteries are patent without stenosis or  occlusion. The distal right internal carotid artery is torturous. The  vertebral arteries are opacified without significant ostial narrowing or  stenosis. The right vertebral artery is dominant. No evidence of  vertebral artery dissection or pseudoaneurysm.     The submitted soft tissues of the neck are unremarkable..  Lung apices are clear. There is mucosal thickening of the frontal  sinuses, ethmoid air cells,  sphenoid sinus, and bilateral maxillary  sinuses. The mastoid air cells are clear.       Impression:         1. No arterial occlusion or flow-limiting stenosis in the neck.  2. No intracranial large vessel occlusion or flow-limiting stenosis.     This report was signed and finalized on 7/13/2024 11:12 AM by Glen Ace.       CT Head Without Contrast [[id number]] Collected: 07/13/24 1106     Updated: 07/13/24 1112    Narrative:      EXAM: CT HEAD WO CONTRAST-      DATE: 7/13/2024 9:40 AM     HISTORY: eval for stroke       COMPARISON: None available.     DOSE LENGTH PRODUCT: 1577.03 mGy.cm  Automated exposure control was also  utilized to decrease patient radiation dose.     TECHNIQUE: Unenhanced CT images obtained from vertex to skull base with  multiplanar reformats.     FINDINGS:  There is no acute intracranial hemorrhage, midline shift, mass effect,  or hydrocephalus. There is no CT evidence for acute infarct.        There is mucosal thickening at the frontal sinuses, ethmoid air cells,  and sphenoid sinus. The mastoid air cells are clear. Scalp soft tissues  are unremarkable. Bilateral orbits and globes are unremarkable.       Impression:         1. No acute intracranial findings.   2. Paranasal sinus mucosal thickening.     This report was signed and finalized on 7/13/2024 11:08 AM by Glen Ace.       XR Chest 1 View [[id number]] Collected: 07/13/24 1105     Updated: 07/13/24 1109    Narrative:      EXAM: XR CHEST 1 VW-      DATE: 7/13/2024 9:39 AM     HISTORY: AMS recent sinusitis       COMPARISON: None available.     TECHNIQUE:  Frontal view(s) of the chest submitted.     FINDINGS:    There are low lung volumes but the lungs are grossly clear. Heart and  mediastinum are unremarkable. No effusion or pneumothorax is seen.          Impression:         1. No active disease in the chest.     This report was signed and finalized on 7/13/2024 11:05 AM by Glen Ace.             I have personally  reviewed and interpreted the radiology studies and ECG obtained at time of admission.     Assessment / Plan   Assessment:   Active Hospital Problems    Diagnosis     **Altered mental status        Treatment Plan  The patient will be admitted to my service here at [facility].  1.  Acute encephalopathy  2.  History of hyperlipidemia  3.  History of hypertension  4.  History of GERD        The patient will be admitted, neurology consult was placed, neurochecks, patient to have an MRI of the brain, physical therapy, Occupational Therapy and speech therapy, will follow-up on final results of lumbar puncture, patient was started on aspirin and statin.      Medical Decision Making  Number and Complexity of problems: 4  Differential Diagnosis: None    Conditions and Status        Condition is unchanged.     MDM Data  External documents reviewed: n/a  Cardiac tracing (EKG, telemetry) interpretation: sinus   Radiology interpretation: CT brain   Labs reviewed: yes  Any tests that were considered but not ordered: none     Decision rules/scores evaluated (example [id number], Wells, etc): n/a     Discussed with: wife , son , nursing staff      Care Planning  Shared decision making: patient  Code status and discussions: full    Disposition  Social Determinants of Health that impact treatment or disposition: none  Estimated length of stay is 2 days.     I confirmed that the patient's advanced care plan is present, code status is documented, and a surrogate decision maker is listed in the patient's medical record.     The patient's surrogate decision maker is patient.     The patient was seen and examined by me on 7/13/2024  at 1440.    Electronically signed by Andrew Corrigan MD, 07/13/24, 14:58 CDT.              Electronically signed by Andrew Corrigan MD at 07/13/24 1514          Emergency Department Notes        Kevin Albrecht II, RN at 07/13/24 1445          Report called to Ra VERA     Electronically signed by  "Kevin Albrecht II, RN at 07/13/24 9590       Jorge Live MD at 07/13/24 1014          Subjective   History of Present Illness  Patient presents due to altered mental state.  Noticed it this morning.  Was last known normal between 9 and 10 PM last night when his wife spoke with him.  He has had an upper respiratory infection for couple weeks, with nonproductive cough, no shortness of breath.  No falls or head injuries.  She tried to alert him this morning, asking if he wanted to get up for the day, and he responded \"I do not know what you want me to do with it.\"  She denies any slurred speech or facial droop.  He has not been making any sense throughout the morning.  EMS was called, and brings the patient in emergently for arrival.  Point-of-care glucose is stable.  No history of stroke.    Review of Systems   Constitutional:  Negative for chills and fever.   Respiratory:  Positive for cough. Negative for shortness of breath.    Cardiovascular:  Negative for chest pain and palpitations.   Gastrointestinal:  Negative for abdominal pain and vomiting.   Genitourinary:  Negative for difficulty urinating and dysuria.   Neurological:  Negative for syncope and light-headedness.       Past Medical History:   Diagnosis Date    GERD (gastroesophageal reflux disease)     Hyperlipidemia     Hypertension        No Known Allergies    Past Surgical History:   Procedure Laterality Date    HERNIA REPAIR         Family History   Problem Relation Age of Onset    Breast cancer Mother     Bone cancer Mother     Breast cancer Sister        Social History     Socioeconomic History    Marital status:    Tobacco Use    Smoking status: Never    Smokeless tobacco: Never   Vaping Use    Vaping status: Never Used   Substance and Sexual Activity    Alcohol use: No    Drug use: Never           Objective   Physical Exam  Vitals reviewed.   Constitutional:       General: He is not in acute distress.  HENT:      Head: " "Normocephalic and atraumatic.      Comments: No focal intraoral swelling.  No uvular deviation.  No posterior pharyngeal erythema or exudate.  No tonsillar exudate or focal tonsillar swelling.  Intraoral exam overall unremarkable.     Right Ear: Tympanic membrane normal.      Left Ear: Tympanic membrane normal.   Eyes:      Extraocular Movements: Extraocular movements intact.      Conjunctiva/sclera: Conjunctivae normal.   Cardiovascular:      Pulses: Normal pulses.      Heart sounds: Normal heart sounds.   Pulmonary:      Effort: Pulmonary effort is normal. No respiratory distress.      Breath sounds: Normal breath sounds. No wheezing.   Abdominal:      General: Abdomen is flat. There is no distension.      Tenderness: There is no abdominal tenderness. There is no guarding.   Musculoskeletal:      Cervical back: Normal range of motion and neck supple.   Skin:     General: Skin is warm and dry.   Neurological:      General: No focal deficit present.      Mental Status: Mental status is at baseline.      Comments: Appropriately interactive; not keenly engaging with exam, but not somnolent or obtunded. Follows commands to voice.  Blinks eyes, squeezes hands.  Does not answer month and age; states 8 when asked about his age which is his birth month.  No diminished sensation throughout the extremities.  No drift in any extremity.  Does not understand coordination testing with either arm or either leg.  Does exhibit normal coordination with crossing his legs in bed and trying to follow other commands.  Answers visual field deficit questions properly, no visual field deficit.  Pupils are equal round and reactive.  Extraocular movements are full and normal.  Answers questions with fluent speech, but confused; for example, states \"well I don't know\" when asked about any symptoms. Does not complete sentences. Denies pain.     NIHSS: LOC - +1  Month and age - +2  Language - +2 (has limitation to speech due to his aphasia " "and/or confusion)  Inattention - +1    No lateralizing signs.   Psychiatric:         Behavior: Behavior normal.         Thought Content: Thought content normal.         Procedures          ED Course  ED Course as of 07/13/24 1516   Sat Jul 13, 2024   1031 Pt exhibits more of a generalized encephelopathy but does have a NIHSS 6 so vessel imaging would be reasonable although he has no lateralizing signs I will d/w Dr. Johnson [AS]   1038 I discussed with Dr. Johnson who recommends no code stroke at this time given that altered mental status due to encephalopathic process is more favored.  Will obtain vessel imaging, and patient may need MRI for stroke, but in the absence of an LVO there is no beneficial emergent management that could be performed of stroke. [AS]   1200 Pt is same on re-eval. No change. No LVO on CTA. Will d/w Dr. Johnson about proceeding with MRI and plan to admit. No meningismus or fever to suggest meningitis. [AS]   1200 Pt still with nonsensical speech no dysarthria and mental status is similar. He is able to state his birth date. He states \"Well, other side\" when asked location. [AS]   1250 Spoken with Dr. Johnson.  Shared the update on the patient, negative workup so far.  He does not recommend any stat MRI. [AS]   1250 Hemoglobin: 15.7  Will admit to the hospitalist for further workup, family is updated. [AS]   1257 NIHSS 6 was d/w Dr. Johnson on arrival [AS]      ED Course User Index  [AS] Jorge Live MD                          Total (NIH Stroke Scale): 4                  Medical Decision Making  Problems Addressed:  Altered mental status, unspecified altered mental status type: complicated acute illness or injury    Amount and/or Complexity of Data Reviewed  Labs: ordered. Decision-making details documented in ED Course.  Radiology: ordered.  ECG/medicine tests: ordered.    Risk  Prescription drug management.  Decision regarding hospitalization.      Ty Webb is a 58 y.o. " male with PMH above who presents to the Emergency Department with AMS.  Exhibits a nonspecific encephalopathic sort of picture with an elevated night stroke scale as outlined above.  See ED course regarding my discussion with Dr. Johnson about whether or not to activate a code stroke in this patient.  Given that he meets in the night stroke scale of 6 I will obtain a CTA to evaluate for any LVO so that if he has any reversible process or emergency management that could be performed in the event that he has a stroke and this can be done given that I have a lower suspicion for stroke but it is not ruled out at this time.  Given his recent history of upper respiratory infection, I have considered a deep space infection but his neck exam is supple with no tenderness, ear exam is normal, no intraoral findings, no fevers.  Wife denies any history of fevers.  No tenderness in the abdomen no chest pain or trouble breathing per history his lung sounds are normal he does not have peripheral edema.  Not clinically septic.  Overall unclear etiology; infectious workup is obtained.  Will obtain electrolyte studies, check for renal failure or other possible etiology of altered mental status.  He also received a medication yesterday via shot at urgent care, it is unclear what this medication was, unclear if he could be having a reaction to this.  No allergic reaction symptoms.    ED Course:   -See ED course.      Final diagnosis: AMS    All questions answered. Patient/family was understanding and in agreement with today's assessment and plan. The patient was monitored during their stay in the ED and dispositioned without acute event.    Electronically signed by:  Jorge Live MD 7/13/2024 15:16 CDT      Note: Dragon medical dictation software was used in the creation of this note.        Final diagnoses:   Altered mental status, unspecified altered mental status type       ED Disposition  ED Disposition       ED Disposition    Decision to Admit    Condition   --    Comment   Level of Care: Remote Telemetry [26]   Diagnosis: Altered mental status [780.97.ICD-9-CM]   Admitting Physician: ANDREW CORRIGAN [[id number]]   Attending Physician: ANDREW CORRIGAN [[id number]]   Certification: I Certify That Inpatient Hospital Services Are Medically Necessary For Greater Than 2 Midnights                 No follow-up provider specified.       Medication List      No changes were made to your prescriptions during this visit.            Jorge Live MD  07/13/24 1353       Jorge Live MD  07/13/24 1516      Electronically signed by Jorge Live MD at 07/13/24 1516       Vital Signs (last 2 days)       Date/Time Temp Temp src Pulse Resp BP Patient Position SpO2    07/15/24 1139 -- Axillary 89 20 151/96 Lying 95    07/15/24 0814 98.3 (36.8) Axillary 89 20 146/61 Lying 91    07/15/24 0307 98.1 (36.7) Axillary 89 18 147/93 Lying 94    07/15/24 0025 98.2 (36.8) Oral 89 18 150/98 Lying 92    07/14/24 1939 98.5 (36.9) Axillary 88 18 143/106 Lying 91    07/14/24 1645 98.8 (37.1) Axillary 87 22 150/83 Lying 94    07/14/24 1147 100 (37.8) Axillary 81 20 120/54 Lying 93    07/14/24 0744 99.5 (37.5) Axillary 87 19 113/66 Lying 91    07/14/24 0345 98.7 (37.1) Axillary 60 20 121/65 Lying 94    07/13/24 2317 100.5 (38.1) Axillary 90 22 130/70 Lying 90    07/13/24 2009 100.3 (37.9) Axillary 85 20 133/72 Lying 99    07/13/24 1508 97.9 (36.6) Axillary 117 18 114/86 Lying 98    07/13/24 1431 -- -- 55 18 139/93 -- 98    07/13/24 1331 -- -- 88 18 155/93 -- 99    07/13/24 1027 97.6 (36.4) -- -- -- -- -- --    07/13/24 1015 -- -- 86 15 151/100 -- 93          Current Facility-Administered Medications   Medication Dose Route Frequency Provider Last Rate Last Admin    acetaminophen (TYLENOL) tablet 650 mg  650 mg Oral Q6H PRN Andrew Corrigan MD        Or    acetaminophen (TYLENOL) suppository 650 mg  650 mg Rectal Q6H PRN Andrew Corrigan,  MD   650 mg at 07/14/24 1403    aspirin tablet 325 mg  325 mg Oral Daily Andrew Corrigan MD        Or    aspirin suppository 300 mg  300 mg Rectal Daily Andrew Corrigan MD   300 mg at 07/13/24 1701    atorvastatin (LIPITOR) tablet 80 mg  80 mg Oral Nightly Andrew Corrigan MD        cefTRIAXone (ROCEPHIN) 1,000 mg in sodium chloride 0.9 % 100 mL MBP  1,000 mg Intravenous Q24H Leobardo Cerna  mL/hr at 07/15/24 0939 1,000 mg at 07/15/24 0939    melatonin tablet 5 mg  5 mg Oral Nightly PRN Kishore Key MD        methylPREDNISolone sodium succinate (SOLU-Medrol) 1,000 mg in sodium chloride 0.9 % 100 mL IVPB  1,000 mg Intravenous Daily Kishore Key  mL/hr at 07/15/24 1434 1,000 mg at 07/15/24 1434    Followed by    [START ON 7/20/2024] predniSONE (DELTASONE) tablet 60 mg  60 mg Oral Daily With Breakfast Kishore Key MD        Followed by    [START ON 8/3/2024] predniSONE (DELTASONE) tablet 50 mg  50 mg Oral Daily With Breakfast Kishore Key MD        Followed by    [START ON 8/17/2024] predniSONE (DELTASONE) tablet 40 mg  40 mg Oral Daily With Breakfast Kishore Key MD        Followed by    [START ON 8/31/2024] predniSONE (DELTASONE) tablet 30 mg  30 mg Oral Daily With Breakfast Kishore Key MD        Followed by    [START ON 9/14/2024] predniSONE (DELTASONE) tablet 20 mg  20 mg Oral Daily With Breakfast Kishore Key MD        ondansetron (ZOFRAN) injection 4 mg  4 mg Intravenous Q6H PRN Leobardo Cerna MD        sodium chloride 0.9 % flush 10 mL  10 mL Intravenous Q12H Andrew Corrigan MD   10 mL at 07/14/24 0954    sodium chloride 0.9 % flush 10 mL  10 mL Intravenous PRN Andrew Corrigan MD        sodium chloride 0.9 % infusion 40 mL  40 mL Intravenous PRN Andrew Corrigan MD        sodium chloride 0.9 % infusion  100 mL/hr Intravenous Continuous Andrew Corrigan  mL/hr at 07/15/24 0947 100 mL/hr at 07/15/24 0947    ziprasidone  (GEODON) injection 10 mg  10 mg Intramuscular Q6H PRN Andrew Corrigan MD   10 mg at 07/15/24 1226        Physician Progress Notes (last 48 hours)        Leobardo Cerna MD at 07/15/24 0854              [facility] Medicine Services  INPATIENT PROGRESS NOTE    Patient Name: Ty Webb  Date of Admission: 7/13/2024  Today's Date: 07/15/24  Length of Stay: 2  Primary Care Physician: Irineo Cunningham APRN    Subjective   Chief Complaint: Altered mental status.  HPI   Tmax 100 . T-current 98.3.  Blood pressure slightly high but stable, afebrile.  Patient still remain confused.  Patient does know his date of birth and home address.  Patient is oriented x 0.  Rocephin was started today due to possible sinus infection.  Plan for high-dose steroids by nephrology.    Review of Systems   Constitutional:  Positive for activity change and appetite change. Negative for chills and fever.   HENT:  Negative for hearing loss, nosebleeds, tinnitus and trouble swallowing.    Eyes:  Negative for visual disturbance.   Respiratory:  Negative for cough, chest tightness, shortness of breath and wheezing.    Cardiovascular:  Negative for chest pain, palpitations and leg swelling.   Gastrointestinal:  Negative for abdominal distention, abdominal pain, blood in stool, constipation, diarrhea, nausea and vomiting.   Endocrine: Negative for cold intolerance, heat intolerance, polydipsia, polyphagia and polyuria.   Genitourinary:  Negative for decreased urine volume, difficulty urinating, dysuria, flank pain, frequency and hematuria.   Musculoskeletal:  Positive for arthralgias, gait problem and myalgias. Negative for joint swelling.   Skin:  Negative for rash.   Allergic/Immunologic: Negative for immunocompromised state.   Neurological:  Negative for dizziness, syncope, weakness, light-headedness and headaches.   Hematological:  Negative for adenopathy. Does not bruise/bleed easily.   Psychiatric/Behavioral:   Positive for confusion. Negative for sleep disturbance. The patient is not nervous/anxious.       All pertinent negatives and positives are as above. All other systems have been reviewed and are negative unless otherwise stated.     Objective    Temp:  [98.1 °F (36.7 °C)-100 °F (37.8 °C)] 98.3 °F (36.8 °C)  Heart Rate:  [81-89] 89  Resp:  [18-22] 20  BP: (120-150)/() 146/61  Physical Exam  Vitals and nursing note reviewed.   HENT:      Head: Normocephalic.   Eyes:      Conjunctiva/sclera: Conjunctivae normal.      Pupils: Pupils are equal, round, and reactive to light.   Cardiovascular:      Rate and Rhythm: Normal rate and regular rhythm.      Heart sounds: Normal heart sounds.   Pulmonary:      Effort: Pulmonary effort is normal. No respiratory distress.      Breath sounds: Normal breath sounds.   Abdominal:      General: Bowel sounds are normal. There is no distension.      Palpations: Abdomen is soft.      Tenderness: There is no abdominal tenderness.   Musculoskeletal:         General: No swelling.      Cervical back: Neck supple.   Skin:     General: Skin is warm and dry.      Capillary Refill: Capillary refill takes 2 to 3 seconds.      Findings: No rash.   Neurological:      Mental Status: He is alert.      Motor: Weakness present.      Coordination: Coordination abnormal.      Gait: Gait abnormal.   Psychiatric:         Mood and Affect: Mood normal.         Behavior: Behavior normal.             Results Review:  I have reviewed the labs, radiology results, and diagnostic studies.    Laboratory Data:   Results from last 7 days   Lab Units 07/15/24  0418 07/13/24  1022   WBC 10*3/mm3 7.43 7.62   HEMOGLOBIN g/dL 16.0 15.7   HEMATOCRIT % 47.3 45.5   PLATELETS 10*3/mm3 160 160        Results from last 7 days   Lab Units 07/15/24  0418 07/13/24  1022   SODIUM mmol/L 137 135*   POTASSIUM mmol/L 4.2 4.2   CHLORIDE mmol/L 100 98   CO2 mmol/L 26.0 26.0   BUN mg/dL 18 14   CREATININE mg/dL 0.97 1.04   CALCIUM  "mg/dL 8.6 8.9   BILIRUBIN mg/dL  --  0.6   ALK PHOS U/L  --  66   ALT (SGPT) U/L  --  31   AST (SGOT) U/L  --  22   GLUCOSE mg/dL 110* 156*       Culture Data:   No results found for: \"BLOODCX\", \"URINECX\", \"WOUNDCX\", \"MRSACX\", \"RESPCX\", \"STOOLCX\"    Radiology Data:   Imaging Results (Last 24 Hours)       Procedure Component Value Units Date/Time    MRI Brain With & Without Contrast [[id number]] Collected: 07/14/24 1303     Updated: 07/14/24 1308    Narrative:      MRI BRAIN W WO CONTRAST- 7/14/2024 11:08 AM     HISTORY: encephalitis?; R41.82-Altered mental status, unspecified;  R13.10-Dysphagia, unspecified     COMPARISON: None available  TECHNIQUE: Multisequence, multiplanar MRI of the brain with and without  contrast.        FINDINGS:     There are no areas of restricted diffusion. Posterior fossa and midline  structures are unremarkable. There are no significant T2 signal  abnormalities. There is no mass effect, midline shift, or hydrocephalus.  There is no evidence for hemorrhage. Normal flow related signal voids  are noted in the major intracranial vessels. There is extensive mucosal  thickening of the maxillary sinuses, ethmoid air cells, frontal sinuses,  and sphenoid sinus. Mastoid air cells are clear. There are no areas of  pathologic contrast enhancement. There is motion artifact.          Impression:           1. Extensive paranasal sinus mucosal thickening.  2. No acute intracranial abnormality identified.  3. Motion degraded exam.     This report was signed and finalized on 7/14/2024 1:05 PM by [place].               I have reviewed the patient's current medications.     Assessment/Plan   Assessment  Active Hospital Problems    Diagnosis     **Altered mental status     Essential hypertension     Gastroesophageal reflux disease without esophagitis     Mixed hyperlipidemia        Treatment Plan  Altered mental status.  Oriented x 0..  Acute encephalopathy.  Possible autoimmune encephalitis.  IV " hydration.  Neurology consult.  . Geodon as needed.  Status post lumbar puncture 7/13/2024.  No evidence infection at this time.  MRI of the brain-Extensive paranasal sinus mucosal thickening, No acute intracranial abnormality identified.  UDS-negative.  EEG-Diffuse cerebral dysfunction of mild degree but nonspecific-  is most commonly seen due to toxic/metabolic cause, No evidence for epilepsy is seen on the study.     Sinusitis/cough.  Rocephin.  Chest x-ray-No active disease in the chest.   Patient is on room air.    Hyperlipidemia/hypertension.  Echocardiogram-ejection fraction 56 to 60%, saline test negative, no obvious valvular abnormality identified in this study.    Reflux.  Zofran as needed.    Hemoglobin A1c 6.5.  Diabetic educator.    Nutrition . N.p.o. for now due to dysphagia.    Deconditioning.  PT and OT consult.    Blood cultures pending.  West Nile is pending.  Varicella negative.  Meningitis panel-negative.  COVID-19-negative.  Flu screen-negative.    Medical Decision Making  Number and Complexity of problems: Altered mental status/sinus/hypertension/hyperlipidemia  Differential Diagnosis: None    Conditions and Status        Condition is unchanged.     [facility] Data  External documents reviewed: Previous note  Cardiac tracing (EKG, telemetry) interpretation: Sinus  Radiology interpretation: MRI/EEG/chest x-ray  Labs reviewed: Laboratory  Any tests that were considered but not ordered: Lab in a.m.     Decision rules/scores evaluated (example [id number], Wells, etc): None     Discussed with: Patient      Care Planning  Shared decision making: Patient  Code status and discussions: Full code    Disposition  Social Determinants of Health that impact treatment or disposition: From home  1 to 4 days    Electronically signed by Leobardo Cerna MD, 07/15/24, 09:00 CDT.    Electronically signed by Leobardo Cerna MD at 07/15/24 1319       Jessie Li APRN at 07/14/24 1340          Wife and son updated on  "results of MRI brain with and without. 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 They are going to find out more and hopefully able to report back tomorrow. EEG in AM.     Electronically signed by Jessie Li APRN at 07/14/24 1345       Andrew Corrigan MD at 07/14/24 1146              [facility] Emergency Medicine Services  INPATIENT PROGRESS NOTE    Patient Name: Ty Webb  Date of Admission: 7/13/2024  Today's Date: 07/14/24  Length of Stay: 1  Primary Care Physician: Irineo Cunningham APRN    Subjective   Chief Complaint: Altered mental status   HPI   Remains nonverbal.        Review of Systems   All pertinent negatives and positives are as above. All other systems have been reviewed and are negative unless otherwise stated.     Objective    Temp:  [97.9 °F (36.6 °C)-100.5 °F (38.1 °C)] 99.5 °F (37.5 °C)  Heart Rate:  [] 87  Resp:  [18-22] 19  BP: (113-155)/(65-93) 113/66  Physical Exam    General: Patient is alert and awake, aphasic, does not follow commands.  HEENT: Normocephalic, atraumatic, pupils are equal reactive to light and accommodate.  Neck: Supple, no JVD, no carotid bruits  CVS: S1, S2, regular rhythm and rate  Lungs: Clear to auscultation bilaterally  Abdomen: Soft, nontender, nondistended bowel sounds are present  Extremities: No cyanosis, no clubbing, no edema pulses are intact  Neurologic: Patient is nonverbal.  Does not follow commands.  Appears to be confused.    Results Review:  I have reviewed the labs, radiology results, and diagnostic studies.    Laboratory Data:   Results from last 7 days   Lab Units 07/13/24  1022   WBC 10*3/mm3 7.62   HEMOGLOBIN g/dL 15.7   HEMATOCRIT % 45.5   PLATELETS 10*3/mm3 160        Results from last 7 days   Lab Units 07/13/24  1022   SODIUM mmol/L 135*   POTASSIUM mmol/L 4.2   CHLORIDE mmol/L 98   CO2 mmol/L 26.0   BUN mg/dL 14   CREATININE mg/dL 1.04 " "  CALCIUM mg/dL 8.9   BILIRUBIN mg/dL 0.6   ALK PHOS U/L 66   ALT (SGPT) U/L 31   AST (SGOT) U/L 22   GLUCOSE mg/dL 156*       Culture Data:   No results found for: \"BLOODCX\", \"URINECX\", \"WOUNDCX\", \"MRSACX\", \"RESPCX\", \"STOOLCX\"    Radiology Data:   Imaging Results (Last 24 Hours)       ** No results found for the last 24 hours. **            I have reviewed the patient's current medications.     Assessment/Plan   Assessment  Active Hospital Problems    Diagnosis     **Altered mental status        Treatment Plan  1.  Acute encephalopathy  2.  History of hyperlipidemia  3.  History of hypertension  4.  History of GERD      Patient had lumbar puncture yesterday, results were reviewed, no evidence of infection at this time.  MRI of the brain is still pending, patient is n.p.o., will start him on IV fluids normal saline, discussed with neurologist, patient may be started on IV steroids for suspected autoimmune encephalitis.    Medical Decision Making  Number and Complexity of problems: 4  Differential Diagnosis: none    Conditions and Status        Condition is unchanged.     MDM Data  External documents reviewed: n/a  Cardiac tracing (EKG, telemetry) interpretation: sinus  Radiology interpretation: reviewed  Labs reviewed: yes  Any tests that were considered but not ordered: none     Decision rules/scores evaluated (example [id number], Wells, etc): n/a     Discussed with: wife, nursing staff , neurologist      Care Planning  Shared decision making: patient , wife  Code status and discussions: full    Disposition  Social Determinants of Health that impact treatment or disposition: none  I expect the patient to be discharged to home in 3 days.     Electronically signed by Andrew Corrigan MD, 07/14/24, 11:46 CDT.    Electronically signed by Andrew Corrigan MD at 07/14/24 9540       Bin Johnson MD at 07/14/24 0979            Neurology Progress Note      Chief Complaint:  Encephalopathy  Length of Stay:  1 "   Subjective     Subjective:    He is currently on the neuro unit.  Overnight he did have some mild agitation.  He received Geodon and then later received a low-dose Ativan.  His wife is at the bedside and assist in the history.  She reports that he has demonstrated some curse words but otherwise has been nonverbal.  She also describes that when he has to have a bowel movement or urinate that he also becomes somewhat more restless.  He is currently wearing a diaper.      Medications:  Current Facility-Administered Medications   Medication Dose Route Frequency Provider Last Rate Last Admin    aspirin tablet 325 mg  325 mg Oral Daily Andrew Crorigan MD        Or    aspirin suppository 300 mg  300 mg Rectal Daily Andrew Corrigan MD   300 mg at 07/13/24 1701    atorvastatin (LIPITOR) tablet 80 mg  80 mg Oral Nightly Andrew Corrigan MD        LORazepam (ATIVAN) injection 2 mg  2 mg Intravenous Once Jessie Li, APRN        sodium chloride 0.9 % flush 10 mL  10 mL Intravenous Q12H Andrew Corrigan MD   10 mL at 07/13/24 2107    sodium chloride 0.9 % flush 10 mL  10 mL Intravenous PRN Andrew Corrigan MD        sodium chloride 0.9 % infusion 40 mL  40 mL Intravenous PRN Andrew Corrigan MD        ziprasidone (GEODON) injection 10 mg  10 mg Intramuscular Q6H PRN Andrew Corrigan MD   10 mg at 07/13/24 2103             Objective      Vital Signs  Temp:  [97.6 °F (36.4 °C)-100.5 °F (38.1 °C)] 99.5 °F (37.5 °C)  Heart Rate:  [] 87  Resp:  [15-22] 19  BP: (113-155)/() 113/66    Physical Exam:    No evidence of neck stiffness  No evidence of photophobia.  The lights are on bright room and the patient does not show any concern in regards to this.  No signs that the patient is in pain nor has a headache    Pertinent Neuro Exam:  Awake.  Eyes open.  Tracks examiner around the room with his eyes.  Disregards the majority of his environment.  Does not follow commands.  Nonverbal.  Cranial nerves II to XII  intact  Moving all extremities with great strength  No pathologic reflexes  Withdraws in all 4 extremities  No signs of gross ataxia      Last nurse assessment:  Interval:  (handoff with mary ann VERA)  1a. Level of Consciousness: 0-->Alert, keenly responsive  1b. LOC Questions: 2-->Answers neither question correctly  1c. LOC Commands: 2-->Performs neither task correctly  2. Best Gaze: 0-->Normal  3. Visual: 0-->No visual loss  4. Facial Palsy: 0-->Normal symmetrical movements  5a. Motor Arm, Left: 2-->Some effort against gravity, limb cannot get to or maintain (if cued) 90 (or 45) degrees, drifts down to bed, but has some effort against gravity (pt doesn't patricipate)  5b. Motor Arm, Right: 2-->Some effort against gravity, limb cannot get to or maintain (if cued) 90 (or 45) degrees, drifts down to bed, but has some effort against gravity (pt doesn't patricipate)  6a. Motor Leg, Left: 2-->Some effort against gravity, leg falls to bed by 5 secs, but has some effort against gravity (pt doesn't patricipate)  6b. Motor Leg, Right: 2-->Some effort against gravity, leg falls to bed by 5 secs, but has some effort against gravity (pt doesn't patricipate)  7. Limb Ataxia: 2-->Present in two limbs (pt doesn't patricipate)  8. Sensory: 0-->Normal, no sensory loss  9. Best Language: 3-->Mute, global aphasia, no usable speech or auditory comprehension  10. Dysarthria: 2-->Severe dysarthria, patients speech is so slurred as to be unintelligible in the absence of or out of proportion to any dysphasia, or is mute/anarthric  11. Extinction and Inattention (formerly Neglect): 0-->No abnormality    Total (NIH Stroke Scale): 19       Results Review:      Labs:  Lumbar puncture results:  White cells 22  Red cells 67  Protein 57.3  Glucose 79  Meningitis/encephalitis panel normal  Lyme Western blot pending  West Nile virus pending  Paraneoplastic panel pending  ACE level pending  Autoimmune encephalitis panel pending  NMDA receptor  pending  MS panel pending  Ammonia level normal  Folate level normal  TSH level normal  B12 level normal  B1 level pending    Imaging:  Head CT without contrast reviewed by me showing no acute findings  MRI brain pending.  Patient did not tolerate this yesterday secondary to some agitation    Assessment/Plan     Hospital Problem List      Altered mental status    Impression:  Altered mentation  Current testing has ruled out an infectious etiology.  No signs of a bacterial nor viral meningitis/encephalitis  Current working diagnoses is an autoimmune encephalitis  Recommend an EEG tomorrow to rule out epileptic causes    Plan:  MRI of brain with and without contrast today utilizing Ativan and Geodon as needed  EEG tomorrow  Continue following up on lumbar puncture results  May consider initiating treatment for autoimmune encephalitis as early as tomorrow utilizing Solu-Medrol 1000 mg IV daily x 5 days  Appreciate internal medicine being the primary attending.  Please continue to trend blood glucose levels if we initiate high-dose steroids as these may elevate.      Medical Decision Making    Number/Complexity of Problems  Moderate  1 undiagnosed new problem with uncertain prognosis -   1 acute illness with systemic symptoms -   High  1 acute or chronic illness that poses a threat to life/body function -   High    MDM Data  Moderate - 1/3 categories  Extensive - 2/3 categories    Category 1: 3 of the following  Review of external notes  Review of results  Ordering of each unique test  Independent historian  Category 2:  Independent interpretation of test (ex: imaging)  Category 3:  Discussion of management with another provider    Extensive     Treatment Plan  Moderate - Prescription Drug management  High  Drug therapy requiring intensive monitoring for toxicity  Decision regarding hospitalization or escalation of care  De-escalate care/DNR decisions  Drug therapy requiring intensive monitoring (high)      Bin JANSEN  MD Elizabeth  07/14/24  09:48 CDT      Electronically signed by Bin Johnson MD at 07/14/24 0955       Consult Notes (last 48 hours)  Notes from 07/13/24 1548 through 07/15/24 1548   No notes of this type exist for this encounter.

## 2024-07-15 NOTE — NURSING NOTE
[facility]  INPATIENT WOUND & OSTOMY CARE    Today's Date: 07/15/24    Patient Name: Ty Webb  MRN: [id number]  CSN: [id number]  PCP: Irineo Cunningham APRN  Attending Provider: Leobardo Cerna MD  Length of Stay: 2    I placed pressure injury prevention measure orders per protocol due to patient being at risk for skin breakdown.    Apply silicone foam border dressing per protocol to sacral spine/bilateral heels for protection.  Nursing to change dressing every 3 days and PRN if soiled. Nursing is to peel back dressing with every assessment to assess skin underneath dressing. No barrier cream under dressing.     Please reach out to wound care nurse if any skin issues arise.     This document has been electronically signed by Brianna Agrawal RN on 7/15/2024 06:49 CDT

## 2024-07-15 NOTE — PLAN OF CARE
Goal Outcome Evaluation:  Plan of Care Reviewed With: patient, spouse, caregiver (JODY Downing)        Progress: improving                       Treatment Assessment (SLP): improved (07/15/24 1218)  Treatment Assessment Comments (SLP): See note (07/15/24 1218)  Plan for Continued Treatment (SLP): goals adjusted to reflect functional improvements demonstrated (07/15/24 1218)    Swallow treatment completed. Wife present and reports patient is improved from prior day. He is alert and speaking but does continue with expressive aphasia. He is confused and easily agitated and trying to get out of bed. RN present and aware. He completed a full range of consistencies excluding soft to chew foods. No overt s/s of aspiration observed. Functional rotary chew given regular solids.     OK to begin regular diet with thin liquids.   Medications as tolerated.   Oral care and at least intermittent supervision with meals.   If decreased alertness or increased confusion please hold PO at that time.     SLP will continue to follow and treat. Speech/Language/Cognitive evaluation is warranted upon follow up with patient.     Evy Montalvo MS CCC-SLP 7/15/2024 12:55 CDT

## 2024-07-15 NOTE — PLAN OF CARE
Goal Outcome Evaluation:      Patient A/O x1 (self only) this shift. Patient had bouts of extreme restlessness overnight--treated per MAR. Patient spouse stayed overnight. Patient incontinent of urine multiple times. Patient frequently pulls at telemetry leads, blankets, and brief. No needs in the room overnight. Will update oncoming dayshift nurse at bedside shift report in the a.m. as appropriate.

## 2024-07-16 LAB
ALBUMIN SERPL-MCNC: 3.9 G/DL (ref 3.5–5.2)
ALBUMIN/GLOB SERPL: 1.3 G/DL
ALP SERPL-CCNC: 60 U/L (ref 39–117)
ALT SERPL W P-5'-P-CCNC: 49 U/L (ref 1–41)
ANION GAP SERPL CALCULATED.3IONS-SCNC: 13 MMOL/L (ref 5–15)
AST SERPL-CCNC: 25 U/L (ref 1–40)
BASOPHILS # BLD AUTO: 0.01 10*3/MM3 (ref 0–0.2)
BASOPHILS NFR BLD AUTO: 0.1 % (ref 0–1.5)
BILIRUB SERPL-MCNC: 0.5 MG/DL (ref 0–1.2)
BUN SERPL-MCNC: 24 MG/DL (ref 8–23)
BUN/CREAT SERPL: 22.2 (ref 7–25)
CALCIUM SPEC-SCNC: 8.8 MG/DL (ref 8.6–10.5)
CHLORIDE SERPL-SCNC: 102 MMOL/L (ref 98–107)
CO2 SERPL-SCNC: 23 MMOL/L (ref 22–29)
CREAT SERPL-MCNC: 1.08 MG/DL (ref 0.76–1.27)
DEPRECATED RDW RBC AUTO: 40.7 FL (ref 37–54)
EGFRCR SERPLBLD CKD-EPI 2021: 78.6 ML/MIN/1.73
EOSINOPHIL # BLD AUTO: 0 10*3/MM3 (ref 0–0.4)
EOSINOPHIL NFR BLD AUTO: 0 % (ref 0.3–6.2)
ERYTHROCYTE [DISTWIDTH] IN BLOOD BY AUTOMATED COUNT: 13 % (ref 12.3–15.4)
GLOBULIN UR ELPH-MCNC: 3 GM/DL
GLUCOSE SERPL-MCNC: 181 MG/DL (ref 65–99)
HCT VFR BLD AUTO: 44.9 % (ref 37.5–51)
HGB BLD-MCNC: 15.5 G/DL (ref 13–17.7)
IMM GRANULOCYTES # BLD AUTO: 0.04 10*3/MM3 (ref 0–0.05)
IMM GRANULOCYTES NFR BLD AUTO: 0.5 % (ref 0–0.5)
LYMPHOCYTES # BLD AUTO: 0.93 10*3/MM3 (ref 0.7–3.1)
LYMPHOCYTES NFR BLD AUTO: 11 % (ref 19.6–45.3)
MCH RBC QN AUTO: 29.6 PG (ref 26.6–33)
MCHC RBC AUTO-ENTMCNC: 34.5 G/DL (ref 31.5–35.7)
MCV RBC AUTO: 85.7 FL (ref 79–97)
MONOCYTES # BLD AUTO: 0.21 10*3/MM3 (ref 0.1–0.9)
MONOCYTES NFR BLD AUTO: 2.5 % (ref 5–12)
NEUTROPHILS NFR BLD AUTO: 7.25 10*3/MM3 (ref 1.7–7)
NEUTROPHILS NFR BLD AUTO: 85.9 % (ref 42.7–76)
NRBC BLD AUTO-RTO: 0 /100 WBC (ref 0–0.2)
PLATELET # BLD AUTO: 206 10*3/MM3 (ref 140–450)
PMV BLD AUTO: 10.4 FL (ref 6–12)
POTASSIUM SERPL-SCNC: 4.2 MMOL/L (ref 3.5–5.2)
PROT SERPL-MCNC: 6.9 G/DL (ref 6–8.5)
RBC # BLD AUTO: 5.24 10*6/MM3 (ref 4.14–5.8)
SODIUM SERPL-SCNC: 138 MMOL/L (ref 136–145)
WBC NRBC COR # BLD AUTO: 8.44 10*3/MM3 (ref 3.4–10.8)

## 2024-07-16 PROCEDURE — 97535 SELF CARE MNGMENT TRAINING: CPT

## 2024-07-16 PROCEDURE — 80053 COMPREHEN METABOLIC PANEL: CPT | Performed by: FAMILY MEDICINE

## 2024-07-16 PROCEDURE — 97530 THERAPEUTIC ACTIVITIES: CPT

## 2024-07-16 PROCEDURE — 97110 THERAPEUTIC EXERCISES: CPT

## 2024-07-16 PROCEDURE — 25010000002 CEFTRIAXONE PER 250 MG: Performed by: FAMILY MEDICINE

## 2024-07-16 PROCEDURE — 97116 GAIT TRAINING THERAPY: CPT

## 2024-07-16 PROCEDURE — 92526 ORAL FUNCTION THERAPY: CPT | Performed by: SPEECH-LANGUAGE PATHOLOGIST

## 2024-07-16 PROCEDURE — 25010000002 METHYLPREDNISOLONE PER 125 MG: Performed by: STUDENT IN AN ORGANIZED HEALTH CARE EDUCATION/TRAINING PROGRAM

## 2024-07-16 PROCEDURE — 99233 SBSQ HOSP IP/OBS HIGH 50: CPT | Performed by: STUDENT IN AN ORGANIZED HEALTH CARE EDUCATION/TRAINING PROGRAM

## 2024-07-16 PROCEDURE — 85025 COMPLETE CBC W/AUTO DIFF WBC: CPT | Performed by: FAMILY MEDICINE

## 2024-07-16 RX ORDER — CEFDINIR 300 MG/1
300 CAPSULE ORAL EVERY 12 HOURS SCHEDULED
Status: CANCELLED | OUTPATIENT
Start: 2024-07-17 | End: 2024-07-20

## 2024-07-16 RX ORDER — BENZONATATE 100 MG/1
200 CAPSULE ORAL 3 TIMES DAILY PRN
Status: DISCONTINUED | OUTPATIENT
Start: 2024-07-16 | End: 2024-07-19 | Stop reason: HOSPADM

## 2024-07-16 RX ADMIN — SODIUM CHLORIDE 1000 MG: 900 INJECTION INTRAVENOUS at 10:14

## 2024-07-16 RX ADMIN — ATORVASTATIN CALCIUM 80 MG: 40 TABLET ORAL at 20:08

## 2024-07-16 RX ADMIN — ASPIRIN 325 MG: 325 TABLET, FILM COATED ORAL at 08:51

## 2024-07-16 RX ADMIN — BENZONATATE 200 MG: 100 CAPSULE ORAL at 20:08

## 2024-07-16 RX ADMIN — SODIUM CHLORIDE 1000 MG: 9 INJECTION, SOLUTION INTRAVENOUS at 08:52

## 2024-07-16 RX ADMIN — Medication 10 ML: at 20:08

## 2024-07-16 NOTE — PAYOR COMM NOTE
"7/15 CLINICAL  [id number]    [id number]    Joey Argueta (58 y.o. Male)       Date of Birth   1965    Social Security Number       Address   [address] [place] [address]    Home Phone   [phone]    MRN   [id number]       [facility]    Marital Status                               Admission Date   7/13/24    Admission Type   Emergency    Admitting Provider   Leobardo Cerna MD    Attending Provider   Leobardo Cerna MD    Department, Room/Bed   [facility] 3A, 353/1       Discharge Date       Discharge Disposition       Discharge Destination                                 Attending Provider: Leobardo Cerna MD    Allergies: No Known Allergies    Isolation: None   Infection: None   Code Status: CPR    Ht: 203.2 cm (80\")   Wt: 122 kg (270 lb)    Admission Cmt: None   Principal Problem: Altered mental status [R41.82]                   Active Insurance as of 7/13/2024       Primary Coverage       Payor Plan Insurance Group Employer/Plan Group    Formerly [facility] [employer] Formerly [facility] [id number]       Payor Plan Address Payor Plan Phone Number Payor Plan Fax Number Effective Dates    [address] [phone]  1/1/2019 - None Entered    [place] [id number]         Subscriber Name Subscriber Birth Date Member ID       JOEY ARGUETA 1965 [id number]                     Emergency Contacts        (Rel.) Home Phone Work Phone Mobile Phone    Jennie Argueta (Spouse) [phone] -- [phone]              Current Facility-Administered Medications   Medication Dose Route Frequency Provider Last Rate Last Admin    acetaminophen (TYLENOL) tablet 650 mg  650 mg Oral Q6H PRN Andrew Corrigan MD        Or    acetaminophen (TYLENOL) suppository 650 mg  650 mg Rectal Q6H PRN Andrew Corrigan MD   650 mg at 07/14/24 1403    aspirin tablet 325 mg  325 mg Oral Daily Andrew Corrigan MD   325 mg at 07/16/24 0851    Or    aspirin suppository 300 mg  300 mg " Rectal Daily Andrew Corrigan MD   300 mg at 07/13/24 1701    atorvastatin (LIPITOR) tablet 80 mg  80 mg Oral Nightly Andrew Corrigan MD   80 mg at 07/15/24 2019    cefTRIAXone (ROCEPHIN) 1,000 mg in sodium chloride 0.9 % 100 mL MBP  1,000 mg Intravenous Q24H Leobardo Cerna  mL/hr at 07/16/24 1014 1,000 mg at 07/16/24 1014    melatonin tablet 5 mg  5 mg Oral Nightly PRN Kishore Key MD        methylPREDNISolone sodium succinate (SOLU-Medrol) 1,000 mg in sodium chloride 0.9 % 100 mL IVPB  1,000 mg Intravenous Daily Kishore Key  mL/hr at 07/16/24 0852 1,000 mg at 07/16/24 0852    Followed by    [START ON 7/20/2024] predniSONE (DELTASONE) tablet 60 mg  60 mg Oral Daily With Breakfast Kishore Key MD        Followed by    [START ON 8/3/2024] predniSONE (DELTASONE) tablet 50 mg  50 mg Oral Daily With Breakfast Kishore Kye MD        Followed by    [START ON 8/17/2024] predniSONE (DELTASONE) tablet 40 mg  40 mg Oral Daily With Breakfast Kishore Key MD        Followed by    [START ON 8/31/2024] predniSONE (DELTASONE) tablet 30 mg  30 mg Oral Daily With Breakfast Kishore Key MD        Followed by    [START ON 9/14/2024] predniSONE (DELTASONE) tablet 20 mg  20 mg Oral Daily With Breakfast Kishore Key MD        ondansetron (ZOFRAN) injection 4 mg  4 mg Intravenous Q6H PRN Leobardo Cerna MD        sodium chloride 0.9 % flush 10 mL  10 mL Intravenous Q12H Andrew Corrigan MD   10 mL at 07/15/24 2018    sodium chloride 0.9 % flush 10 mL  10 mL Intravenous PRN Andrew Corrigan MD        sodium chloride 0.9 % infusion 40 mL  40 mL Intravenous PRN Andrew Corrigan MD        sodium chloride 0.9 % infusion  100 mL/hr Intravenous Continuous Andrew Corrigan  mL/hr at 07/15/24 2231 100 mL/hr at 07/15/24 2231    ziprasidone (GEODON) injection 10 mg  10 mg Intramuscular Q6H PRN Andrew Corrigan MD   10 mg at 07/15/24 1226     Orders (last 24  "hrs)        Start     Ordered    24 0800  predniSONE (DELTASONE) tablet 20 mg  Daily With Breakfast        Placed in \"Followed by\" Linked Group    07/15/24 1309    24 0800  predniSONE (DELTASONE) tablet 30 mg  Daily With Breakfast        Placed in \"Followed by\" Linked Group    07/15/24 1309    24 0800  predniSONE (DELTASONE) tablet 40 mg  Daily With Breakfast        Placed in \"Followed by\" Linked Group    07/15/24 1309    24 0800  predniSONE (DELTASONE) tablet 50 mg  Daily With Breakfast        Placed in \"Followed by\" Linked Group    07/15/24 1309    24 0900  predniSONE (DELTASONE) tablet 60 mg  Daily With Breakfast        Placed in \"Followed by\" Linked Group    07/15/24 1309    24 0600  Comprehensive Metabolic Panel  Morning Draw         07/15/24 1320    24 0600  CBC & Differential  Morning Draw         07/15/24 1320    24 0600  CBC Auto Differential  PROCEDURE ONCE         07/15/24 2201    07/15/24 1728  CT Abdomen Pelvis With Contrast  1 Time Imaging,   Status:  Canceled         07/15/24 1728    07/15/24 1615  iopamidol (ISOVUE-300) 61 % injection 100 mL  Once in Imaging         07/15/24 1527    07/15/24 1535  PT Plan of Care Cert / Re-Cert  Once        Comments: Physical Therapy Plan of Care  Initial Certification  Certification Period: 7/15/2024 - 10/13/2024    Patient Name: Ty Webb  : 1965    (R41.82) Altered mental status, unspecified altered mental status type  (primary encounter diagnosis)  (R13.10) Dysphagia, unspecified type  (Z74.09) Impaired mobility [Z74.09]                  Assessment  PT Assessment  Rehab Potential (PT): good, to achieve stated therapy goals  Criteria for Skilled Interventions Met (PT): yes, meets criteria, skilled treatment is necessary         PT Rehab Goals     Row Name 07/15/24 1305             Bed Mobility Goal 1 (PT)    Activity/Assistive Device (Bed Mobility Goal 1, PT) sit to supine;supine   to sit;rolling to " left;rolling to right  -SB      [place] Level/Cues Needed (Bed Mobility Goal 1, PT) contact guard   required  -SB      Time Frame (Bed Mobility Goal 1, PT) long term goal (LTG)  -SB      Progress/Outcomes (Bed Mobility Goal 1, PT) new goal  -SB         Transfer Goal 1 (PT)    Activity/Assistive Device (Transfer Goal 1, PT)   sit-to-stand/stand-to-sit;bed-to-chair/chair-to-bed  -SB      [place] Level/Cues Needed (Transfer Goal 1, PT) minimum assist (75%   or more patient effort)  -SB      Time Frame (Transfer Goal 1, PT) long term goal (LTG)  -SB      Progress/Outcome (Transfer Goal 1, PT) new goal  -SB         Gait Training Goal 1 (PT)    Activity/Assistive Device (Gait Training Goal 1, PT) gait (walking   locomotion);decrease fall risk;diminish gait deviation;improve balance and   speed  -SB      [place] Level (Gait Training Goal 1, PT) minimum assist (75% or more   patient effort)  -SB      Distance (Gait Training Goal 1, PT) 20  -SB      Time Frame (Gait Training Goal 1, PT) long term goal (LTG)  -SB      Progress/Outcome (Gait Training Goal 1, PT) new goal  -SB            User Key  (r) = Recorded By, (t) = Taken By, (c) = Cosigned By    Initials Name Provider Type Discipline    Ashlyn Lugo, PT DPT Physical Therapist PT               PT OP Goals     Row Name 07/15/24 1532          Time Calculation    PT Goal Re-Cert Due Date 07/25/24  -SB           User Key  (r) = Recorded By, (t) = Taken By, (c) = Cosigned By    Initials Name Provider Type    Ashlyn Lugo PT DPT Physical Therapist                  Plan  PT Plan  Therapy Frequency (PT): 2 times/day  Predicted Duration of Therapy Intervention (PT): until d/c or goals met  Planned Therapy Interventions (PT): balance training, strengthening, bed mobility training, gait training, patient/family education, transfer training, motor coordination training  Outcome Evaluation: PT eval completed. Pt alert and oriented to person only, reports no  "symptoms and has expressive aphasia. Family present to assist with history and reports patient was fully independent at home prior to arrival. Today, pt follows approx 25% of one step commands with increased cues and time, but has decreased attention throughout session. Pt recently received Geodon and therefore was becoming lethargic as session progressed. Observed 3/5 strength in BLE but unable to assess formally due to cognition. Pt attempted sup to sit t/f x3 reps but each time patient had onset of significant abdominal pain with palpable tender lump in R upper rectus abdominus area- nsg notified. Pt rolls in bed with mod-max A and is able to reach for bedrail during roll. Pt will benefit from skilled PT to improve fxl mob, safety and independence. Recommend d/c acute rehab.        Ashlyn Carmona PT DPT  7/15/2024            By cosigning this order, either electronically or physically, I certify that the therapy services are furnished while this patient is under my care, the services outline above are required by this patient, and will be reviewed every 90 days.        KONGD.:__________________________________________ Date: ______________    07/15/24 1535    07/15/24 1400  methylPREDNISolone sodium succinate (SOLU-Medrol) 1,000 mg in sodium chloride 0.9 % 100 mL IVPB  Daily        Placed in \"Followed by\" Linked Group    07/15/24 1309    07/15/24 1356  CT Abdomen With Contrast  1 Time Imaging         07/15/24 1356    07/15/24 1311  melatonin tablet 5 mg  Nightly PRN         07/15/24 1311    07/15/24 1258  Diet: Regular/House; Texture: Regular (IDDSI 7); Fluid Consistency: Thin (IDDSI 0)  Diet Effective Now         07/15/24 1257    07/15/24 1158  OT Plan of Care Cert / Re-Cert  Once        Comments: Occupational Therapy Plan of Care  Initial Certification  Certification Period: 7/15/2024 - 10/13/2024    Patient Name: Ty Webb  : 1965    (R41.82) Altered mental status, unspecified altered mental status " type  (primary encounter diagnosis)  (R13.10) Dysphagia, unspecified type                Assessment  OT Assessment  OT Diagnosis: decreased ADLs  Rehab Potential (OT): good, to achieve stated therapy goals  Criteria for Skilled Therapeutic Interventions Met (OT): yes, meets criteria, skilled treatment is necessary         OT Rehab Goals     Row Name 07/15/24 0938             Bathing Goal 1 (OT)    Activity/Device (Bathing Goal 1, OT) upper body bathing;lower body   bathing  -EC      [place] Level/Cues Needed (Bathing Goal 1, OT) minimum assist (75%   or more patient effort)  -EC      Time Frame (Bathing Goal 1, OT) long term goal (LTG)  -EC      Progress/Outcomes (Bathing Goal 1, OT) new goal  -EC         Toileting Goal 1 (OT)    Activity/Device (Toileting Goal 1, OT) adjust/manage clothing;perform   perineal hygiene  -EC      [place] Level/Cues Needed (Toileting Goal 1, OT) minimum assist (75%   or more patient effort)  -EC      Time Frame (Toileting Goal 1, OT) long term goal (LTG)  -EC      Progress/Outcome (Toileting Goal 1, OT) new goal  -EC         Problem Specific Goal 1 (OT)    Problem Specific Goal 1 (OT) Pt will sequence ADL task with less than 2   vcs for increased I with fxnal activities.  -EC      Time Frame (Problem Specific Goal 1, OT) long term goal (LTG)  -EC      Progress/Outcome (Problem Specific Goal 1, OT) new goal  -EC            User Key  (r) = Recorded By, (t) = Taken By, (c) = Cosigned By    Initials Name Provider Type Discipline    EC Mary Lyon, OTR/L Occupational Therapist OT               OT Goals     Row Name 07/15/24 1010          Time Calculation    OT Goal Re-Cert Due Date 07/25/24  -EC           User Key  (r) = Recorded By, (t) = Taken By, (c) = Cosigned By    Initials Name Provider Type    EC Mary Lyon, OTR/L Occupational Therapist                Plan    OT Plan  Therapy Frequency (OT): 5 times/wk  Predicted Duration of Therapy Intervention (OT): 10 days  Outcome  "Evaluation: OT enrico completed. Pt able to state his name but demos significant expressive aphasia. Wife at bedside and states that he was independent with ADLs, fxn mob, driving & work prior to his onset of AMS. Today he demos significantly impaired initiation & sequencing. He can follow some simple commands but often requires tactile cues to initiate bed mobility, UBD, LBD, & fxn mob. While donning socks, he did stop skilled nursing & state \"I'm just confused\" requiring assist to complete the task. Unable to perform formal MMT d/t his command following impairments however his BUE coordination seems to be moderately impaired during brief initiation of finger to nose. Pt stood with modA on first attempt and then sat back down. Pt did not follow cues to march or side step however when therapist pointed to chair & instructed him to sit there, he was able to take a few steps with HHA x2 and sit in the chair. Pt demos balance, cognitive, coordination, & motor planning deficits in which impair his safety & I with ADLs & fxn mob. He would benefit from skilled OT to address these deficits. Recommend acute rehab at this time, discussed this with pt & wife, she is agreeable to this plan.      Mary Lyon, OTR/L  7/15/2024        By cosigning this order, either electronically or physically, I certify that the therapy services are furnished while this patient is under my care, the services outline above are required by this patient, and will be reviewed every 90 days.        M.D.:__________________________________________ Date: ______________    07/15/24 1157    07/15/24 0945  cefTRIAXone (ROCEPHIN) 1,000 mg in sodium chloride 0.9 % 100 mL MBP  Every 24 Hours         07/15/24 0858    07/15/24 0856  ondansetron (ZOFRAN) injection 4 mg  Every 6 Hours PRN         07/15/24 0856    07/14/24 1415  sodium chloride 0.9 % infusion  Continuous         07/14/24 1324    07/14/24 1325  acetaminophen (TYLENOL) tablet 650 mg  Every 6 Hours PRN      " "  Placed in \"Or\" Linked Group    07/14/24 1325    07/14/24 1325  acetaminophen (TYLENOL) suppository 650 mg  Every 6 Hours PRN        Placed in \"Or\" Linked Group    07/14/24 1325    07/13/24 2100  sodium chloride 0.9 % flush 10 mL  Every 12 Hours Scheduled         07/13/24 1515    07/13/24 2100  atorvastatin (LIPITOR) tablet 80 mg  Nightly         07/13/24 1515    07/13/24 1758  ziprasidone (GEODON) injection 10 mg  Every 6 Hours PRN         07/13/24 1758    07/13/24 1615  aspirin tablet 325 mg  Daily        Placed in \"Or\" Linked Group    07/13/24 1515    07/13/24 1615  aspirin suppository 300 mg  Daily        Placed in \"Or\" Linked Group    07/13/24 1515    07/13/24 1600  Intake and Output  Every 4 Hours       07/13/24 1515    07/13/24 1515  sodium chloride 0.9 % flush 10 mL  As Needed         07/13/24 1515    07/13/24 1515  sodium chloride 0.9 % infusion 40 mL  As Needed         07/13/24 1515    Unscheduled  Order CT Head Without Contrast for Neurological Decline  As Needed       07/13/24 1515    Unscheduled  Wound Care  As Needed       07/15/24 0648    --  carvedilol (COREG) 25 MG tablet  2 Times Daily With Meals         07/13/24 1633    --  methylPREDNISolone (MEDROL) 4 MG tablet  Daily         07/13/24 1641    --  atorvastatin (LIPITOR) 10 MG tablet  Daily         07/13/24 1711    --  brompheniramine-pseudoephedrine-DM 30-2-10 MG/5ML syrup  4 Times Daily PRN         07/13/24 1712    --  esomeprazole (nexIUM) 40 MG capsule  Every Morning Before Breakfast         07/13/24 1713                     Operative/Procedure Notes (all)        Bin Johnson MD at 07/13/24 1453  Version 1 of 1       Pre-procedure Diagnoses    1. Encephalitis [G04.90]               Post-procedure Diagnoses    1. Encephalitis [G04.90]               Procedures    1. BEDSIDE LUMBAR PUNCTURE [PRO88 (Custom)]                 Procedure: Lumbar puncture  Indication for procedure: Considering encephalitis    Procedure description: Informed " consent was obtained.  A timeout was performed.  The patient was laid on his right side.  He was draped in a sterile fashion.  The L3-L4 intervertebral disc base was identified using palpating techniques.  ChloraPrep was used as a sterilizing agent.  1% lidocaine was utilized as an anesthetic agent around the local region.  A standard lumbar puncture needle was inserted.  Approximately 16 cc of clear CSF was obtained.  There was a very slow drip and therefore the manometer was not used determine opening or closing pressure.  Standard lumbar puncture needle was then removed.  No blood was seen.  Pressure was held for approximately 1 minute.  Then a Band-Aid was utilized to cover the insertion point.  The patient was instructed to lay flat for approximately 30 minutes.  There were no complications and no blood loss.    Electronically signed by Bin Johnson MD, 07/13/24, 2:56 PM CDT.      Electronically signed by Bin Johnson MD at 07/13/24 1456          Physician Progress Notes (last 48 hours)        Kishore Key MD at 07/15/24 1101            Neurology Progress Note      Chief Complaint:  Encephalopathy  Length of Stay:  2   Subjective     Subjective:  Seen at bedside with wife and sister, who have reported significant overnight improvement in his mental status not being able to be awake and speaking in few sentences, still very altered from his baseline.  No longer with fever    Medications:  Current Facility-Administered Medications   Medication Dose Route Frequency Provider Last Rate Last Admin    acetaminophen (TYLENOL) tablet 650 mg  650 mg Oral Q6H PRN Andrew oCrrigan MD        Or    acetaminophen (TYLENOL) suppository 650 mg  650 mg Rectal Q6H PRN Andrew Corrigan MD   650 mg at 07/14/24 1403    aspirin tablet 325 mg  325 mg Oral Daily Andrew Corrigan MD        Or    aspirin suppository 300 mg  300 mg Rectal Daily Andrew Corrigan MD   300 mg at 07/13/24 1701    atorvastatin  (LIPITOR) tablet 80 mg  80 mg Oral Nightly Andrew Corrigan MD        cefTRIAXone (ROCEPHIN) 1,000 mg in sodium chloride 0.9 % 100 mL MBP  1,000 mg Intravenous Q24H Leobardo Cerna  mL/hr at 07/15/24 0939 1,000 mg at 07/15/24 0939    ondansetron (ZOFRAN) injection 4 mg  4 mg Intravenous Q6H PRN Leobardo Cerna MD        sodium chloride 0.9 % flush 10 mL  10 mL Intravenous Q12H Andrew Corrigan MD   10 mL at 07/14/24 0954    sodium chloride 0.9 % flush 10 mL  10 mL Intravenous PRN Andrew Corrigan MD        sodium chloride 0.9 % infusion 40 mL  40 mL Intravenous PRN Andrew Corrigan MD        sodium chloride 0.9 % infusion  100 mL/hr Intravenous Continuous Andrew Corrigan  mL/hr at 07/15/24 0947 100 mL/hr at 07/15/24 0947    ziprasidone (GEODON) injection 10 mg  10 mg Intramuscular Q6H PRN Andrew Corrigan MD   10 mg at 07/15/24 1226             Objective      Vital Signs  Temp:  [98.1 °F (36.7 °C)-98.8 °F (37.1 °C)] 98.3 °F (36.8 °C)  Heart Rate:  [87-89] 89  Resp:  [18-22] 20  BP: (143-151)/() 151/96    Physical Exam:    No evidence of neck stiffness  No evidence of photophobia.  The lights are on bright room and the patient does not show any concern in regards to this.  No signs that the patient is in pain nor has a headache    Pertinent Neuro Exam:  Awake.  Eyes open.  Tracks examiner around the room with his eyes.  Disregards the majority of his environment.  Follows very simple commands with coaching, unable to follow complex commands.  Minimally verbal however is able to say 2-3 word sentences.  Cranial nerves II to XII intact  Moving all extremities with great strength  No pathologic reflexes  No signs of gross ataxia      Last nurse assessment:  Interval:  (handoff with mary ann VERA)  1a. Level of Consciousness: 0-->Alert, keenly responsive  1b. LOC Questions: 2-->Answers neither question correctly  1c. LOC Commands: 2-->Performs neither task correctly  2. Best Gaze: 0-->Normal  3.  Visual: 0-->No visual loss  4. Facial Palsy: 0-->Normal symmetrical movements  5a. Motor Arm, Left: 2-->Some effort against gravity, limb cannot get to or maintain (if cued) 90 (or 45) degrees, drifts down to bed, but has some effort against gravity  5b. Motor Arm, Right: 2-->Some effort against gravity, limb cannot get to or maintain (if cued) 90 (or 45) degrees, drifts down to bed, but has some effort against gravity  6a. Motor Leg, Left: 2-->Some effort against gravity, leg falls to bed by 5 secs, but has some effort against gravity  6b. Motor Leg, Right: 2-->Some effort against gravity, leg falls to bed by 5 secs, but has some effort against gravity  7. Limb Ataxia: 2-->Present in two limbs  8. Sensory: 0-->Normal, no sensory loss  9. Best Language: 3-->Mute, global aphasia, no usable speech or auditory comprehension  10. Dysarthria: 2-->Severe dysarthria, patients speech is so slurred as to be unintelligible in the absence of or out of proportion to any dysphasia, or is mute/anarthric  11. Extinction and Inattention (formerly Neglect): 0-->No abnormality    Total (NIH Stroke Scale): 19       Results Review:      Labs:  Lumbar puncture results:  White cells 22  Red cells 67  Protein 57.3  Glucose 79  Meningitis/encephalitis panel normal  Lyme Western blot pending  West Nile virus pending  Paraneoplastic panel pending  ACE level pending  Autoimmune encephalitis panel pending  NMDA receptor pending  MS panel pending  Ammonia level normal  Folate level normal  TSH level normal  B12 level normal  B1 level pending    Imaging:  Head CT without contrast reviewed by me showing no acute findings  MRI brain with and without contrast 7/14: With extensive paranasal sinus mucosal thickening, however no acute intracranial abnormality and no contrast-enhancement.  EEG 7/15: With diffuse cerebral dysfunction, but no epileptiform discharges.    Assessment/Plan     Hospital Problem List      Altered mental status    Essential  hypertension    Gastroesophageal reflux disease without esophagitis    Mixed hyperlipidemia    Impression:  Altered mentation  Current testing has ruled out an infectious etiology.  No signs of a bacterial nor viral meningitis/encephalitis  Current working diagnoses is an autoimmune encephalitis      Plan:  IV methylprednisolone 1 g daily for 5 days followed by prednisone 60 mg p.o. for 2 weeks follow-up with slow taper of reducing 10 mg every 2 weeks, until reaching dose of 20 mg daily and then further steroid depending on neurology appointment.    Continue following up on lumbar puncture results  Appreciate internal medicine being the primary attending.  Please continue to trend blood glucose levels if we initiate high-dose steroids as these may elevate.      Medical Decision Making    Number/Complexity of Problems  Moderate  1 undiagnosed new problem with uncertain prognosis -   1 acute illness with systemic symptoms -   High  1 acute or chronic illness that poses a threat to life/body function -   High    MDM Data  Moderate - 1/3 categories  Extensive - 2/3 categories    Category 1: 3 of the following  Review of external notes  Review of results  Ordering of each unique test  Independent historian  Category 2:  Independent interpretation of test (ex: imaging)  Category 3:  Discussion of management with another provider    Extensive     Treatment Plan  Moderate - Prescription Drug management  High  Drug therapy requiring intensive monitoring for toxicity  Decision regarding hospitalization or escalation of care  De-escalate care/DNR decisions  Drug therapy requiring intensive monitoring (high)      Kishore Key MD  07/15/24  12:31 CDT      Electronically signed by Kishore Key MD at 07/15/24 8000       Leobardo Cerna MD at 07/15/24 7483              [facility]  INPATIENT PROGRESS NOTE    Patient Name: Ty Webb  Date of Admission:  7/13/2024  Today's Date: 07/15/24  Length of Stay: 2  Primary Care Physician: Irineo Cunningham, JOSÉ LUIS    Subjective   Chief Complaint: Altered mental status.  HPI   Tmax 100 . T-current 98.3.  Blood pressure slightly high but stable, afebrile.  Patient still remain confused.  Patient does know his date of birth and home address.  Patient is oriented x 0.  Rocephin was started today due to possible sinus infection.  Plan for high-dose steroids by nephrology.    Review of Systems   Constitutional:  Positive for activity change and appetite change. Negative for chills and fever.   HENT:  Negative for hearing loss, nosebleeds, tinnitus and trouble swallowing.    Eyes:  Negative for visual disturbance.   Respiratory:  Negative for cough, chest tightness, shortness of breath and wheezing.    Cardiovascular:  Negative for chest pain, palpitations and leg swelling.   Gastrointestinal:  Negative for abdominal distention, abdominal pain, blood in stool, constipation, diarrhea, nausea and vomiting.   Endocrine: Negative for cold intolerance, heat intolerance, polydipsia, polyphagia and polyuria.   Genitourinary:  Negative for decreased urine volume, difficulty urinating, dysuria, flank pain, frequency and hematuria.   Musculoskeletal:  Positive for arthralgias, gait problem and myalgias. Negative for joint swelling.   Skin:  Negative for rash.   Allergic/Immunologic: Negative for immunocompromised state.   Neurological:  Negative for dizziness, syncope, weakness, light-headedness and headaches.   Hematological:  Negative for adenopathy. Does not bruise/bleed easily.   Psychiatric/Behavioral:  Positive for confusion. Negative for sleep disturbance. The patient is not nervous/anxious.       All pertinent negatives and positives are as above. All other systems have been reviewed and are negative unless otherwise stated.     Objective    Temp:  [98.1 °F (36.7 °C)-100 °F (37.8 °C)] 98.3 °F (36.8 °C)  Heart Rate:  [81-89] 89  Resp:   "[18-22] 20  BP: (120-150)/() 146/61  Physical Exam  Vitals and nursing note reviewed.   HENT:      Head: Normocephalic.   Eyes:      Conjunctiva/sclera: Conjunctivae normal.      Pupils: Pupils are equal, round, and reactive to light.   Cardiovascular:      Rate and Rhythm: Normal rate and regular rhythm.      Heart sounds: Normal heart sounds.   Pulmonary:      Effort: Pulmonary effort is normal. No respiratory distress.      Breath sounds: Normal breath sounds.   Abdominal:      General: Bowel sounds are normal. There is no distension.      Palpations: Abdomen is soft.      Tenderness: There is no abdominal tenderness.   Musculoskeletal:         General: No swelling.      Cervical back: Neck supple.   Skin:     General: Skin is warm and dry.      Capillary Refill: Capillary refill takes 2 to 3 seconds.      Findings: No rash.   Neurological:      Mental Status: He is alert.      Motor: Weakness present.      Coordination: Coordination abnormal.      Gait: Gait abnormal.   Psychiatric:         Mood and Affect: Mood normal.         Behavior: Behavior normal.             Results Review:  I have reviewed the labs, radiology results, and diagnostic studies.    Laboratory Data:   Results from last 7 days   Lab Units 07/15/24  0418 07/13/24  1022   WBC 10*3/mm3 7.43 7.62   HEMOGLOBIN g/dL 16.0 15.7   HEMATOCRIT % 47.3 45.5   PLATELETS 10*3/mm3 160 160        Results from last 7 days   Lab Units 07/15/24  0418 07/13/24  1022   SODIUM mmol/L 137 135*   POTASSIUM mmol/L 4.2 4.2   CHLORIDE mmol/L 100 98   CO2 mmol/L 26.0 26.0   BUN mg/dL 18 14   CREATININE mg/dL 0.97 1.04   CALCIUM mg/dL 8.6 8.9   BILIRUBIN mg/dL  --  0.6   ALK PHOS U/L  --  66   ALT (SGPT) U/L  --  31   AST (SGOT) U/L  --  22   GLUCOSE mg/dL 110* 156*       Culture Data:   No results found for: \"BLOODCX\", \"URINECX\", \"WOUNDCX\", \"MRSACX\", \"RESPCX\", \"STOOLCX\"    Radiology Data:   Imaging Results (Last 24 Hours)       Procedure Component Value Units " Date/Time    MRI Brain With & Without Contrast [[id number]] Collected: 07/14/24 1303     Updated: 07/14/24 1308    Narrative:      MRI BRAIN W WO CONTRAST- 7/14/2024 11:08 AM     HISTORY: encephalitis?; R41.82-Altered mental status, unspecified;  R13.10-Dysphagia, unspecified     COMPARISON: None available  TECHNIQUE: Multisequence, multiplanar MRI of the brain with and without  contrast.        FINDINGS:     There are no areas of restricted diffusion. Posterior fossa and midline  structures are unremarkable. There are no significant T2 signal  abnormalities. There is no mass effect, midline shift, or hydrocephalus.  There is no evidence for hemorrhage. Normal flow related signal voids  are noted in the major intracranial vessels. There is extensive mucosal  thickening of the maxillary sinuses, ethmoid air cells, frontal sinuses,  and sphenoid sinus. Mastoid air cells are clear. There are no areas of  pathologic contrast enhancement. There is motion artifact.          Impression:           1. Extensive paranasal sinus mucosal thickening.  2. No acute intracranial abnormality identified.  3. Motion degraded exam.     This report was signed and finalized on 7/14/2024 1:05 PM by [place].               I have reviewed the patient's current medications.     Assessment/Plan   Assessment  Active Hospital Problems    Diagnosis     **Altered mental status     Essential hypertension     Gastroesophageal reflux disease without esophagitis     Mixed hyperlipidemia        Treatment Plan  Altered mental status.  Oriented x 0..  Acute encephalopathy.  Possible autoimmune encephalitis.  IV hydration.  Neurology consult.  . Geodon as needed.  Status post lumbar puncture 7/13/2024.  No evidence infection at this time.  MRI of the brain-Extensive paranasal sinus mucosal thickening, No acute intracranial abnormality identified.  UDS-negative.  EEG-Diffuse cerebral dysfunction of mild degree but nonspecific-  is most commonly  "seen due to toxic/metabolic cause, No evidence for epilepsy is seen on the study.     Sinusitis/cough.  Rocephin.  Chest x-ray-No active disease in the chest.   Patient is on room air.    Hyperlipidemia/hypertension.  Echocardiogram-ejection fraction 56 to 60%, saline test negative, no obvious valvular abnormality identified in this study.    Reflux.  Zofran as needed.    Hemoglobin A1c 6.5.  Diabetic educator.    Nutrition . N.p.o. for now due to dysphagia.    Deconditioning.  PT and OT consult.    Blood cultures pending.  West Nile is pending.  Varicella negative.  Meningitis panel-negative.  COVID-19-negative.  Flu screen-negative.    Medical Decision Making  Number and Complexity of problems: Altered mental status/sinus/hypertension/hyperlipidemia  Differential Diagnosis: None    Conditions and Status        Condition is unchanged.     [facility] Data  External documents reviewed: Previous note  Cardiac tracing (EKG, telemetry) interpretation: Sinus  Radiology interpretation: MRI/EEG/chest x-ray  Labs reviewed: Laboratory  Any tests that were considered but not ordered: Lab in a.m.     Decision rules/scores evaluated (example [id number], Wells, etc): None     Discussed with: Patient      Care Planning  Shared decision making: Patient  Code status and discussions: Full code    Disposition  Social Determinants of Health that impact treatment or disposition: From home  1 to 4 days    Electronically signed by Leobardo Cerna MD, 07/15/24, 09:00 CDT.    Electronically signed by Leobardo Cerna MD at 07/15/24 1319       Jessie Li, APRN at 07/14/24 1340          Wife and son updated on results of MRI brain with and without. 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 They are going to find out more and hopefully able to report back tomorrow. EEG in AM.     Electronically signed by Jessie Li, APRN at 07/14/24 1345       Andrew Corrigan MD " "at 07/14/24 1146              [facility] Medicine Services  INPATIENT PROGRESS NOTE    Patient Name: Ty Webb  Date of Admission: 7/13/2024  Today's Date: 07/14/24  Length of Stay: 1  Primary Care Physician: Irineo Cunningham, JOSÉ LUIS    Subjective   Chief Complaint: Altered mental status   HPI   Remains nonverbal.        Review of Systems   All pertinent negatives and positives are as above. All other systems have been reviewed and are negative unless otherwise stated.     Objective    Temp:  [97.9 °F (36.6 °C)-100.5 °F (38.1 °C)] 99.5 °F (37.5 °C)  Heart Rate:  [] 87  Resp:  [18-22] 19  BP: (113-155)/(65-93) 113/66  Physical Exam    General: Patient is alert and awake, aphasic, does not follow commands.  HEENT: Normocephalic, atraumatic, pupils are equal reactive to light and accommodate.  Neck: Supple, no JVD, no carotid bruits  CVS: S1, S2, regular rhythm and rate  Lungs: Clear to auscultation bilaterally  Abdomen: Soft, nontender, nondistended bowel sounds are present  Extremities: No cyanosis, no clubbing, no edema pulses are intact  Neurologic: Patient is nonverbal.  Does not follow commands.  Appears to be confused.    Results Review:  I have reviewed the labs, radiology results, and diagnostic studies.    Laboratory Data:   Results from last 7 days   Lab Units 07/13/24  1022   WBC 10*3/mm3 7.62   HEMOGLOBIN g/dL 15.7   HEMATOCRIT % 45.5   PLATELETS 10*3/mm3 160        Results from last 7 days   Lab Units 07/13/24  1022   SODIUM mmol/L 135*   POTASSIUM mmol/L 4.2   CHLORIDE mmol/L 98   CO2 mmol/L 26.0   BUN mg/dL 14   CREATININE mg/dL 1.04   CALCIUM mg/dL 8.9   BILIRUBIN mg/dL 0.6   ALK PHOS U/L 66   ALT (SGPT) U/L 31   AST (SGOT) U/L 22   GLUCOSE mg/dL 156*       Culture Data:   No results found for: \"BLOODCX\", \"URINECX\", \"WOUNDCX\", \"MRSACX\", \"RESPCX\", \"STOOLCX\"    Radiology Data:   Imaging Results (Last 24 Hours)       ** No results found for the last 24 hours. **      "       I have reviewed the patient's current medications.     Assessment/Plan   Assessment  Active Hospital Problems    Diagnosis     **Altered mental status        Treatment Plan  1.  Acute encephalopathy  2.  History of hyperlipidemia  3.  History of hypertension  4.  History of GERD      Patient had lumbar puncture yesterday, results were reviewed, no evidence of infection at this time.  MRI of the brain is still pending, patient is n.p.o., will start him on IV fluids normal saline, discussed with neurologist, patient may be started on IV steroids for suspected autoimmune encephalitis.    Medical Decision Making  Number and Complexity of problems: 4  Differential Diagnosis: none    Conditions and Status        Condition is unchanged.     [facility] Data  External documents reviewed: n/a  Cardiac tracing (EKG, telemetry) interpretation: sinus  Radiology interpretation: reviewed  Labs reviewed: yes  Any tests that were considered but not ordered: none     Decision rules/scores evaluated (example [id number], Wells, etc): n/a     Discussed with: wife, nursing staff , neurologist      Care Planning  Shared decision making: patient , wife  Code status and discussions: full    Disposition  Social Determinants of Health that impact treatment or disposition: none  I expect the patient to be discharged to home in 3 days.     Electronically signed by Andrew Corrigan MD, 07/14/24, 11:46 CDT.    Electronically signed by Andrew Corrigan MD at 07/14/24 1153       Consult Notes (last 48 hours)  Notes from 07/14/24 1108 through 07/16/24 1108   No notes of this type exist for this encounter.

## 2024-07-16 NOTE — THERAPY TREATMENT NOTE
Acute Care - Physical Therapy Treatment Note  [facility]     Patient Name: Ty Webb  : 1965  MRN: [id number]  Today's Date: 2024      Visit Dx:     ICD-10-CM ICD-9-CM   1. Altered mental status, unspecified altered mental status type  R41.82 780.97   2. Dysphagia, unspecified type  R13.10 787.20   3. Impaired mobility [Z74.09]  Z74.09 799.89     Patient Active Problem List   Diagnosis    Essential hypertension    Gastroesophageal reflux disease without esophagitis    Mixed hyperlipidemia    Cough    Influenza B    Perineal pain    Altered mental status     Past Medical History:   Diagnosis Date    GERD (gastroesophageal reflux disease)     Hyperlipidemia     Hypertension      Past Surgical History:   Procedure Laterality Date    HERNIA REPAIR       PT Assessment (Last 12 Hours)       PT Evaluation and Treatment       [facility] Name 24 1435 24 1315       Physical Therapy Time and Intention    Subjective Information no complaints  -KJ --    Document Type therapy note (daily note)  -KJ --    Mode of Treatment physical therapy  -KJ --  -KJ    Patient Effort good  -KJ --      [facility] Name 24 1435          General Information    Existing Precautions/Restrictions fall  -KJ       Row Name 24 1435          Pain    Pretreatment Pain Rating 0/10 - no pain  -KJ     Posttreatment Pain Rating 0/10 - no pain  -KJ       [facility] Name 24 1435          Bed Mobility    Supine-Sit [place] (Bed Mobility) independent  -KJ     Sit-Supine [place] (Bed Mobility) independent  -KJ       [facility] Name 24 1435          Sit-Stand Transfer    Sit-Stand [place] (Transfers) supervision  -KJ       [facility] Name 24 1435          Stand-Sit Transfer    Stand-Sit [place] (Transfers) supervision  -KJ       Row Name 24 1435          Gait/Stairs (Locomotion)    [place] Level (Gait) contact guard  -KJ     Distance in Feet (Gait) 200  -KJ       [facility] Name 24 1435          Motor Skills     Therapeutic Exercise aerobic  -KJ       Row Name 07/16/24 1435          Aerobic Exercise    Comment, Aerobic Exercise (Therapeutic Exercise) side stepping, backwards gait, tandem stance, tandem gait, reaching all planes.  Quick turn with walking  -KJ       Row Name 07/16/24 1435          Positioning and Restraints    Pre-Treatment Position in bed  -KJ     Post Treatment Position bed  -KJ     In Bed sitting EOB;call light within reach  -KJ               User Key  (r) = Recorded By, (t) = Taken By, (c) = Cosigned By      Initials Name Provider Type    Nakita Interiano, PTA Physical Therapist Assistant                    Physical Therapy Education       Title: PT OT SLP Therapies (Done)       Topic: Physical Therapy (Done)       Point: Mobility training (Done)       Learning Progress Summary             Patient Acceptance, E,TB, VU by CR at 7/15/2024 2323    Acceptance, E, NR by SB at 7/15/2024 1323    Comment: pt edu on POC, benefits of act, d/c plans   Family Acceptance, E,TB, VU by CR at 7/15/2024 2323    Acceptance, E, NR by SB at 7/15/2024 1323    Comment: pt edu on POC, benefits of act, d/c plans                         Point: Home exercise program (Done)       Learning Progress Summary             Patient Acceptance, E,TB, VU by CR at 7/15/2024 2323   Family Acceptance, E,TB, VU by CR at 7/15/2024 2323                         Point: Body mechanics (Done)       Learning Progress Summary             Patient Acceptance, E,TB, VU by CR at 7/15/2024 2323   Family Acceptance, E,TB, VU by CR at 7/15/2024 2323                         Point: Precautions (Done)       Learning Progress Summary             Patient Acceptance, E,TB, VU by CR at 7/15/2024 2323    Acceptance, E, NR by SB at 7/15/2024 1323    Comment: pt edu on POC, benefits of act, d/c plans   Family Acceptance, E,TB, VU by CR at 7/15/2024 2323    Acceptance, E, NR by SB at 7/15/2024 1323    Comment: pt edu on POC, benefits of act, d/c plans                                          User Key       Initials Effective Dates Name Provider Type Discipline    SB 07/11/23 -  Ashlyn Carmona, PT DPT Physical Therapist PT    CR 03/03/23 -  Sarthak Cisneros, RN Registered Nurse Nurse                  PT Recommendation and Plan         Outcome Measures       Row Name 07/16/24 1500 07/16/24 1300 07/15/24 1000       How much help from another person do you currently need...    Turning from your back to your side while in flat bed without using bedrails? 4  -KJ -- --    Moving from lying on back to sitting on the side of a flat bed without bedrails? 4  -KJ -- --    Moving to and from a bed to a chair (including a wheelchair)? 4  -KJ -- --    Standing up from a chair using your arms (e.g., wheelchair, bedside chair)? 4  -KJ -- --    Climbing 3-5 steps with a railing? 3  -KJ -- --    To walk in hospital room? 4  -KJ -- --    AM-PAC 6 Clicks Score (PT) 23  -KJ -- --    Highest Level of Mobility Goal 7 --> Walk 25 feet or more  -KJ -- --       How much help from another is currently needed...    Putting on and taking off regular lower body clothing? -- 4  -EC 2  -EC    Bathing (including washing, rinsing, and drying) -- 3  -EC 2  -EC    Toileting (which includes using toilet bed pan or urinal) -- 4  -EC 2  -EC    Putting on and taking off regular upper body clothing -- 4  -EC 2  -EC    Taking care of personal grooming (such as brushing teeth) -- 4  -EC 3  -EC    Eating meals -- 4  -EC 3  -EC    AM-PAC 6 Clicks Score (OT) -- 23  -EC 14  -EC       Functional Assessment    Outcome Measure Options AM-PAC 6 Clicks Basic Mobility (PT)  -KJ AM-PAC 6 Clicks Daily Activity (OT)  -EC AM-PAC 6 Clicks Daily Activity (OT)  -EC              User Key  (r) = Recorded By, (t) = Taken By, (c) = Cosigned By      Initials Name Provider Type    Nakita Interiano, PTA Physical Therapist Assistant    EC Mary Lyon, OTR/L Occupational Therapist                     Time Calculation:    PT Charges       Row  Name 07/16/24 1509             Time Calculation    Start Time 1435  -KJ      Stop Time 1458  -KJ      Time Calculation (min) 23 min  -KJ      PT Received On 07/16/24  -KJ      PT Goal Re-Cert Due Date 07/25/24  -KJ         Time Calculation- PT    Total Timed Code Minutes- PT 23 minute(s)  -KJ                User Key  (r) = Recorded By, (t) = Taken By, (c) = Cosigned By      Initials Name Provider Type    Nakita Interiano, ЕКАТЕРИНА Physical Therapist Assistant                  Therapy Charges for Today       Code Description Service Date Service Provider Modifiers Qty    86017578846 HC GAIT TRAINING EA 15 MIN 7/16/2024 Nakita Dietrich, ЕКАТЕРИНА GP 1    20957116100 HC PT THER PROC EA 15 MIN 7/16/2024 Nakita Dietrich, PTA GP 1            PT G-Codes  Outcome Measure Options: AM-PAC 6 Clicks Basic Mobility (PT)  AM-PAC 6 Clicks Score (PT): 23  AM-PAC 6 Clicks Score (OT): 23    Nakita Dietrich PTA  7/16/2024

## 2024-07-16 NOTE — PLAN OF CARE
Patient remains oriented X1-2 and follows commands. NIH has improved along with speech, and gait. Patient has been up a few times this shift and easily redirected. Patient took a shower this morning and walked to the bathroom with minimal assist. Patient took medication and ate this shift. No PRN Geodon given. VSS. Afebrile and patient safety maintained. Wife at bedside.     Problem: Adult Inpatient Plan of Care  Goal: Plan of Care Review  Outcome: Ongoing, Progressing  Goal: Patient-Specific Goal (Individualized)  Outcome: Ongoing, Progressing  Goal: Absence of Hospital-Acquired Illness or Injury  Outcome: Ongoing, Progressing  Intervention: Identify and Manage Fall Risk  Recent Flowsheet Documentation  Taken 7/16/2024 0306 by Sarthak Cisneros RN  Safety Promotion/Fall Prevention: safety round/check completed  Taken 7/16/2024 0206 by Sarthak Cisneros RN  Safety Promotion/Fall Prevention: safety round/check completed  Taken 7/16/2024 0106 by Sarthak Cisneros RN  Safety Promotion/Fall Prevention: safety round/check completed  Taken 7/16/2024 0006 by Sarthak Cisneros RN  Safety Promotion/Fall Prevention: safety round/check completed  Taken 7/15/2024 2306 by Sarthak Cisneros RN  Safety Promotion/Fall Prevention: safety round/check completed  Taken 7/15/2024 2206 by Sarthak Cisneros RN  Safety Promotion/Fall Prevention: safety round/check completed  Taken 7/15/2024 2106 by Sarthak Cisneros RN  Safety Promotion/Fall Prevention: safety round/check completed  Taken 7/15/2024 2006 by Sarthak Cisneros RN  Safety Promotion/Fall Prevention: safety round/check completed  Taken 7/15/2024 1906 by Sarthak Cisneros RN  Safety Promotion/Fall Prevention: safety round/check completed  Intervention: Prevent Skin Injury  Recent Flowsheet Documentation  Taken 7/16/2024 0206 by Sarthak Cisneros RN  Body Position: sitting up in bed  Taken 7/16/2024 0006 by Sarthak Cisneros RN  Body Position:   position changed  independently   turned   left  Taken 7/15/2024 2206 by Sarthak Cisneros RN  Body Position:   position changed independently   side-lying   right  Taken 7/15/2024 2006 by Sarthak Cisneros RN  Body Position: sitting up in bed  Skin Protection:   adhesive use limited   tubing/devices free from skin contact   skin-to-skin areas padded   skin-to-device areas padded   incontinence pads utilized  Intervention: Prevent and Manage VTE (Venous Thromboembolism) Risk  Recent Flowsheet Documentation  Taken 7/16/2024 0206 by Sarthak Cisneros RN  Activity Management: activity minimized  Taken 7/16/2024 0006 by Sarthak Cisneros RN  Activity Management: activity minimized  Taken 7/15/2024 2206 by Sarthak Cisneros RN  Activity Management: activity minimized  Taken 7/15/2024 2006 by Sarthak Cisneros RN  Activity Management: bedrest  VTE Prevention/Management: (Pt does not want these on) other (see comments)  Range of Motion: ROM (range of motion) performed  Goal: Optimal Comfort and Wellbeing  Outcome: Ongoing, Progressing  Intervention: Provide Person-Centered Care  Recent Flowsheet Documentation  Taken 7/15/2024 2006 by Sarthak Cisneros RN  Trust Relationship/Rapport:   choices provided   care explained  Goal: Readiness for Transition of Care  Outcome: Ongoing, Progressing     Problem: Hypertension Comorbidity  Goal: Blood Pressure in Desired Range  Outcome: Ongoing, Progressing  Goal: Blood Pressure in Desired Range  Outcome: Ongoing, Progressing     Problem: Skin Injury Risk Increased  Goal: Skin Health and Integrity  Outcome: Ongoing, Progressing  Intervention: Optimize Skin Protection  Recent Flowsheet Documentation  Taken 7/16/2024 0206 by Sarthak Cisneros RN  Head of Bed (HOB) Positioning: HOB at 30-45 degrees  Taken 7/16/2024 0006 by Sarthak Cisneros RN  Head of Bed (HOB) Positioning: HOB at 20-30 degrees  Taken 7/15/2024 2206 by Sarthak Cisneros RN  Head of Bed (HOB) Positioning: HOB at 20-30  degrees  Taken 7/15/2024 2006 by Sarthak Cisneros RN  Pressure Reduction Techniques:   weight shift assistance provided   heels elevated off bed  Head of Bed (HOB) Positioning: HOB at 30-45 degrees  Pressure Reduction Devices: pressure-redistributing mattress utilized  Skin Protection:   adhesive use limited   tubing/devices free from skin contact   skin-to-skin areas padded   skin-to-device areas padded   incontinence pads utilized     Problem: Fall Injury Risk  Goal: Absence of Fall and Fall-Related Injury  Outcome: Ongoing, Progressing  Intervention: Promote Injury-Free Environment  Recent Flowsheet Documentation  Taken 7/16/2024 0306 by Sarthak Cisneros RN  Safety Promotion/Fall Prevention: safety round/check completed  Taken 7/16/2024 0206 by Sarthak Cisneros RN  Safety Promotion/Fall Prevention: safety round/check completed  Taken 7/16/2024 0106 by Sarthak Cisneros RN  Safety Promotion/Fall Prevention: safety round/check completed  Taken 7/16/2024 0006 by Sarthak Cisneros RN  Safety Promotion/Fall Prevention: safety round/check completed  Taken 7/15/2024 2306 by Sarthak Cisneros RN  Safety Promotion/Fall Prevention: safety round/check completed  Taken 7/15/2024 2206 by Sarthak Cisneros RN  Safety Promotion/Fall Prevention: safety round/check completed  Taken 7/15/2024 2106 by Sarthak Cisneros RN  Safety Promotion/Fall Prevention: safety round/check completed  Taken 7/15/2024 2006 by Sarthak Cisneros RN  Safety Promotion/Fall Prevention: safety round/check completed  Taken 7/15/2024 1906 by Sarthak Cisneros RN  Safety Promotion/Fall Prevention: safety round/check completed     Problem: Acute Confusion (Hospitalized Older Adult)  Goal: Optimal Cognitive Function  Outcome: Ongoing, Progressing     Problem: Bowel Elimination Impaired (Hospitalized Older Adult)  Goal: Effective Bowel Elimination  Outcome: Ongoing, Progressing     Problem: Depression (Hospitalized Older Adult)  Goal: Depressive  Symptoms Identified and Managed  Outcome: Ongoing, Progressing     Problem: Fluid Imbalance (Hospitalized Older Adult)  Goal: Fluid Balance  Outcome: Ongoing, Progressing     Problem: Functional Ability Impaired (Hospitalized Older Adult)  Goal: Optimal Functional Ability  Outcome: Ongoing, Progressing     Problem: Oral Intake Inadequate (Hospitalized Older Adult)  Goal: Optimal Nutrition Intake  Outcome: Ongoing, Progressing     Problem: Sleep Disturbance (Hospitalized Older Adult)  Goal: Adequate Sleep/Rest  Outcome: Ongoing, Progressing     Problem: Urinary Incontinence (Hospitalized Older Adult)  Goal: Urinary Incontinence Managed  Outcome: Ongoing, Progressing  Intervention: Promote Urinary Continence  Recent Flowsheet Documentation  Taken 7/16/2024 0215 by Sarthak Cisneros, RN  Perineal Care:   absorbent brief/pad changed   perineum cleansed     Problem: Diabetes Comorbidity  Goal: Blood Glucose Level Within Targeted Range  Outcome: Ongoing, Progressing   Goal Outcome Evaluation:

## 2024-07-16 NOTE — PROGRESS NOTES
Neurology Progress Note      Chief Complaint:  Encephalopathy  Length of Stay:  3     Interval     7/16: Significant improvement in neurological exam.  Received D2 of IV Methylpred 1 g.  Blood cultures from 7/14 no growth to date.  Specialized labs pending.    7/15: Improvement in his mental status as fever improved.  D1 of ceftriaxone for severe sinusitis.  D1 of IV methylprednisolone 1 g daily for 5 days.  Blood cultures from 7/14 no growth to date.  Specialized labs pending.    7/13-14: Lumbar puncture performed, initial labs remarkable for mild pleocytosis and mild elevation of protein.  Meningitis/encephalitis panel PCR negative, West Nile PCR negative, VZV PCR negative, blood cultures drawn.  B12 and folate normal.  TSH normal.  UDS negative.  Specialized lab sent: MS propofol and MBP, NMDA and CSF, encephalopathy panel, ACE, paraneoplastic panel, thiamine, NMO IgG.      Subjective     Subjective:  Seen at bedside with wife and sister, who have reported significant overnight improvement in his mental status not being able to be awake and speaking in few sentences, still very altered from his baseline.  No longer with fever    Medications:  Current Facility-Administered Medications   Medication Dose Route Frequency Provider Last Rate Last Admin    acetaminophen (TYLENOL) tablet 650 mg  650 mg Oral Q6H PRN Andrew Corrigan MD        Or    acetaminophen (TYLENOL) suppository 650 mg  650 mg Rectal Q6H PRN Andrew Corrigan MD   650 mg at 07/14/24 1403    aspirin tablet 325 mg  325 mg Oral Daily Andrew Corrigan MD   325 mg at 07/16/24 0851    Or    aspirin suppository 300 mg  300 mg Rectal Daily Andrew Corrigan MD   300 mg at 07/13/24 1701    atorvastatin (LIPITOR) tablet 80 mg  80 mg Oral Nightly Andrew Corrigan MD   80 mg at 07/15/24 2019    cefTRIAXone (ROCEPHIN) 1,000 mg in sodium chloride 0.9 % 100 mL MBP  1,000 mg Intravenous Q24H Leobardo Cerna  mL/hr at 07/15/24 0939 1,000 mg at 07/15/24  0939    melatonin tablet 5 mg  5 mg Oral Nightly PRN Kisohre Key MD        methylPREDNISolone sodium succinate (SOLU-Medrol) 1,000 mg in sodium chloride 0.9 % 100 mL IVPB  1,000 mg Intravenous Daily Kishore Key  mL/hr at 07/16/24 0852 1,000 mg at 07/16/24 0852    Followed by    [START ON 7/20/2024] predniSONE (DELTASONE) tablet 60 mg  60 mg Oral Daily With Breakfast Kishore Key MD        Followed by    [START ON 8/3/2024] predniSONE (DELTASONE) tablet 50 mg  50 mg Oral Daily With Breakfast Kishore Key MD        Followed by    [START ON 8/17/2024] predniSONE (DELTASONE) tablet 40 mg  40 mg Oral Daily With Breakfast Kishore Key MD        Followed by    [START ON 8/31/2024] predniSONE (DELTASONE) tablet 30 mg  30 mg Oral Daily With Breakfast Kishore Key MD        Followed by    [START ON 9/14/2024] predniSONE (DELTASONE) tablet 20 mg  20 mg Oral Daily With Breakfast Kishore Key MD        ondansetron (ZOFRAN) injection 4 mg  4 mg Intravenous Q6H PRN Leobardo Cerna MD        sodium chloride 0.9 % flush 10 mL  10 mL Intravenous Q12H Andrew Corrigan MD   10 mL at 07/15/24 2018    sodium chloride 0.9 % flush 10 mL  10 mL Intravenous PRN Andrew Corrigan MD        sodium chloride 0.9 % infusion 40 mL  40 mL Intravenous PRN Andrew Corrigan MD        sodium chloride 0.9 % infusion  100 mL/hr Intravenous Continuous Andrew Corrigan  mL/hr at 07/15/24 2231 100 mL/hr at 07/15/24 2231    ziprasidone (GEODON) injection 10 mg  10 mg Intramuscular Q6H PRN Andrew Corrigan MD   10 mg at 07/15/24 1226             Objective      Vital Signs  Temp:  [97.7 °F (36.5 °C)-98.9 °F (37.2 °C)] 97.9 °F (36.6 °C)  Heart Rate:  [] 94  Resp:  [18-20] 18  BP: (147-157)/(89-98) 151/89    Physical Exam:    No evidence of neck stiffness  No evidence of photophobia.  The lights are on bright room and the patient does not show any concern in regards to  this.  No signs that the patient is in pain nor has a headache    Pertinent Neuro Exam:  Awake.  With reduced verbal output however able to form sentences that are complex.  Follows simple and complex commands, is able to repeat complex sentences, no word finding difficulty even with low-frequency words.  Able to estimate 7 quarters in $1.75, able to say the days of the week backwards without hesitation, some difficulty naming the months of the year backwards with skipping 2 months.   Cranial nerves II to XII intact  Moving all extremities with great strength  No pathologic reflexes  No signs of gross ataxia      Last nurse assessment:  Interval:  (handoff)  1a. Level of Consciousness: 0-->Alert, keenly responsive  1b. LOC Questions: 0-->Answers both questions correctly  1c. LOC Commands: 0-->Performs both tasks correctly  2. Best Gaze: 0-->Normal  3. Visual: 0-->No visual loss  4. Facial Palsy: 0-->Normal symmetrical movements  5a. Motor Arm, Left: 0-->No drift, limb holds 90 (or 45) degrees for full 10 secs  5b. Motor Arm, Right: 0-->No drift, limb holds 90 (or 45) degrees for full 10 secs  6a. Motor Leg, Left: 0-->No drift, leg holds 30 degree position for full 5 secs  6b. Motor Leg, Right: 0-->No drift, leg holds 30 degree position for full 5 secs  7. Limb Ataxia: 1-->Present in one limb  8. Sensory: 0-->Normal, no sensory loss  9. Best Language: 1-->Mild-to-moderate aphasia, some obvious loss of fluency or facility of comprehension, without significant limitation on ideas expressed or form of expression. Reduction of speech and/or comprehension, however, makes conversation. . . (see row details)  10. Dysarthria: 0-->Normal  11. Extinction and Inattention (formerly Neglect): 0-->No abnormality    Total (NIH Stroke Scale): 2       Results Review:      Labs:  Lumbar puncture results:  White cells 22  Red cells 67  Protein 57.3  Glucose 79  Meningitis/encephalitis panel normal  Lyme Western blot pending  West Nile  virus pending  Paraneoplastic panel pending  ACE level pending  Autoimmune encephalitis panel pending  NMDA receptor pending  MS panel pending  Ammonia level normal  Folate level normal  TSH level normal  B12 level normal  B1 level pending    Imaging:  Head CT without contrast reviewed by me showing no acute findings  MRI brain with and without contrast 7/14: With extensive paranasal sinus mucosal thickening, however no acute intracranial abnormality and no contrast-enhancement.  EEG 7/15: With diffuse cerebral dysfunction, but no epileptiform discharges.    Assessment/Plan     Hospital Problem List      Altered mental status    Essential hypertension    Gastroesophageal reflux disease without esophagitis    Mixed hyperlipidemia    Impression:  Altered mentation  Current testing has ruled out an infectious etiology.  No signs of a bacterial nor viral meningitis/encephalitis  Current working diagnoses is an autoimmune encephalitis      Plan:  IV methylprednisolone 1 g daily for 5 days followed by prednisone 60 mg p.o. for 2 weeks follow-up with slow taper of reducing 10 mg every 2 weeks, until reaching dose of 20 mg daily and then further steroid depending on neurology appointment.    Continue following up on lumbar puncture results  Appreciate internal medicine being the primary attending.  Please continue to trend blood glucose levels if we initiate high-dose steroids as these may elevate.  Inpatient neurology service will continue to follow      Medical Decision Making    Number/Complexity of Problems  Moderate  1 undiagnosed new problem with uncertain prognosis -   1 acute illness with systemic symptoms -   High  1 acute or chronic illness that poses a threat to life/body function -   High    MDM Data  Moderate - 1/3 categories  Extensive - 2/3 categories    Category 1: 3 of the following  Review of external notes  Review of results  Ordering of each unique test  Independent historian  Category 2:  Independent  interpretation of test (ex: imaging)  Category 3:  Discussion of management with another provider    Extensive     Treatment Plan  Moderate - Prescription Drug management  High  Drug therapy requiring intensive monitoring for toxicity  Decision regarding hospitalization or escalation of care  De-escalate care/DNR decisions  Drug therapy requiring intensive monitoring (high)      Kishore Key MD  07/16/24  10:12 CDT

## 2024-07-16 NOTE — PLAN OF CARE
Goal Outcome Evaluation:  Plan of Care Reviewed With: patient, spouse        Progress: improving                       Treatment Assessment (SLP): improved (07/16/24 0859)  Treatment Assessment Comments (SLP): See note. (07/16/24 0859)  Plan for Continued Treatment (SLP): continue treatment per plan of care (07/16/24 0859)      Swallow follow up completed. The patient is overall improved. He is able to answer questions and is conversational this morning. Wife present at the bedside. They report no concerns with toleration of current diet. He completed trials with no overt s/s of aspiration. Functional rotary chew with solids.     He is OK to continue with regular diet and thin liquids. Will follow up 1 more time and sign off it no new concerns.     Will complete cognitive evaluation if not back to baseline.     Evy Montalvo MS CCC-SLP 7/16/2024 10:40 CDT

## 2024-07-16 NOTE — CASE MANAGEMENT/SOCIAL WORK
Continued Stay Note  LISY Chapman     Patient Name: Ty Webb  MRN: [id number]  Today's Date: 7/16/2024    Admit Date: 7/13/2024    Plan: TBD   Discharge Plan       Row Name 07/16/24 1556       Plan    Plan Comments Events noted. Plan is for return to home with family when medically stable. Will continue to follow.                   Discharge Codes    No documentation.                       FLOWER Crane

## 2024-07-16 NOTE — PLAN OF CARE
Goal Outcome Evaluation:  Plan of Care Reviewed With: patient, spouse        Progress: improving  Outcome Evaluation: OT tx completed- pt has made significant improvements since initial eval in his ability to follow commands, converse appropriately, and sequence tasks. He was oriented to all but situation given the confusion over the last few days. Today he came to the EOB with SBA & donned his socks. He amb around the room with CGA  to retrieve items suctioned to wall high & low. No LOB noted. Pt was able to sequence 6 step tasks with only 1 verbal cue. OT goals updated to reflect current level of function and maximize his safety. Frequency reduced to 3x week d/t significant improvements made. Anticipate d/c home w/ assist once medically stable.

## 2024-07-16 NOTE — THERAPY TREATMENT NOTE
Acute Care - Occupational Therapy Treatment Note  [facility]     Patient Name: Ty Webb  : 1965  MRN: [id number]  Today's Date: 2024     Date of Referral to OT: 24       Admit Date: 2024       ICD-10-CM ICD-9-CM   1. Altered mental status, unspecified altered mental status type  R41.82 780.97   2. Dysphagia, unspecified type  R13.10 787.20   3. Impaired mobility [Z74.09]  Z74.09 799.89     Patient Active Problem List   Diagnosis    Essential hypertension    Gastroesophageal reflux disease without esophagitis    Mixed hyperlipidemia    Cough    Influenza B    Perineal pain    Altered mental status     Past Medical History:   Diagnosis Date    GERD (gastroesophageal reflux disease)     Hyperlipidemia     Hypertension      Past Surgical History:   Procedure Laterality Date    HERNIA REPAIR           OT ASSESSMENT FLOWSHEET (Last 12 Hours)       OT Evaluation and Treatment       Row Name 24 1010                   OT Time and Intention    Subjective Information no complaints  -EC        Document Type therapy note (daily note)  -EC        Mode of Treatment occupational therapy  -EC           General Information    Patient Profile Reviewed yes  -EC        Existing Precautions/Restrictions fall  -EC           Pain Assessment    Pretreatment Pain Rating 0/10 - no pain  -EC        Posttreatment Pain Rating 0/10 - no pain  -EC        Pain Intervention(s) Repositioned;Ambulation/increased activity  -EC           Cognition    Orientation Status (Cognition) oriented to;person;place;time  -EC        Follows Commands (Cognition) follows three-step commands;over 90% accuracy  -EC        Cognitive Interventions/Strategies occupation/activity based interventions  sequencing of 6 step task  -EC           Range of Motion Comprehensive    Comment, General Range of Motion L shoulder impaired 40%, all other WFL  -EC           Strength Comprehensive (MMT)    Comment, General Manual Muscle Testing (MMT)  Assessment RUE 5/5, L shoulder 2+/5, L bicep 4-/5, L tricep 3+/5  -EC           Sensory    Additional Documentation Sensory Assessment (Somatosensory) (Group);Vision Assessment/Intervention (Group)  -EC           Vision Assessment/Intervention    Visual Impairment/Limitations WNL  -EC           Sensory Assessment (Somatosensory)    Sensory Assessment (Somatosensory) UE sensation intact  -EC           Activities of Daily Living    BADL Assessment/Intervention lower body dressing  -EC           Lower Body Dressing Assessment/Training    [place] Level (Lower Body Dressing) don;socks;supervision  -EC        Position (Lower Body Dressing) edge of bed sitting  -EC           Bed Mobility    Bed Mobility supine-sit;sit-supine  -EC        Supine-Sit [place] (Bed Mobility) standby assist  -EC        Sit-Supine [place] (Bed Mobility) supervision  -EC        Assistive Device (Bed Mobility) bed rails;head of bed elevated  -EC           Functional Mobility    Functional Mobility- Ind. Level contact guard assist  -EC        Functional Mobility- Comment in room  -EC           Transfer Assessment/Treatment    Transfers sit-stand transfer;stand-sit transfer  -EC           Sit-Stand Transfer    Sit-Stand [place] (Transfers) standby assist  -EC           Stand-Sit Transfer    Stand-Sit [place] (Transfers) supervision;standby assist  -EC           Balance    Balance Assessment sitting static balance;sitting dynamic balance;standing static balance;standing dynamic balance  -EC        Static Sitting Balance independent  -EC        Dynamic Sitting Balance standby assist  -EC        Position, Sitting Balance unsupported;sitting edge of bed  -EC        Static Standing Balance standby assist  -EC        Dynamic Standing Balance contact guard  -EC        Comment, Balance able to reach high & low for items suctioned to wall outside of [place]  -EC           Plan of Care Review    Plan of Care Reviewed With patient;spouse   -EC        Progress improving  -EC        Outcome Evaluation OT tx completed- pt has made significant improvements since initial eval in his ability to follow commands, converse appropriately, and sequence tasks. He was oriented to all but situation given the confusion over the last few days. Today he came to the EOB with SBA & donned his socks. He amb around the room with CGA  to retrieve items suctioned to wall high & low. No LOB noted. Pt was able to sequence 6 step tasks with only 1 verbal cue. OT goals updated to reflect current level of function and maximize his safety. Frequency reduced to 3x week d/t significant improvements made. Anticipate d/c home w/ assist once medically stable.  -EC           Positioning and Restraints    Pre-Treatment Position in bed  -EC        Post Treatment Position bed  -EC        In Bed fowlers;call light within reach;encouraged to call for assist;exit alarm on;with family/caregiver  -EC           Therapy Assessment/Plan (OT)    Therapy Frequency (OT) 3 times/wk  -EC           Bathing Goal 1 (OT)    Activity/Device (Bathing Goal 1, OT) upper body bathing;lower body bathing  -EC        [place] Level/Cues Needed (Bathing Goal 1, OT) independent  -EC        Time Frame (Bathing Goal 1, OT) long term goal (LTG)  -EC        Progress/Outcomes (Bathing Goal 1, OT) goal partially met;goal revised this date  -EC           Toileting Goal 1 (OT)    Activity/Device (Toileting Goal 1, OT) adjust/manage clothing;perform perineal hygiene  -EC        [place] Level/Cues Needed (Toileting Goal 1, OT) independent  -EC        Time Frame (Toileting Goal 1, OT) long term goal (LTG)  -EC        Progress/Outcome (Toileting Goal 1, OT) goal partially met;goal revised this date  -EC           Problem Specific Goal 1 (OT)    Problem Specific Goal 1 (OT) Pt will sequence ADL task with less than 2 vcs for increased I with fxnal activities.  -EC        Time Frame (Problem Specific Goal 1, OT) long  term goal (LTG)  -EC        Progress/Outcome (Problem Specific Goal 1, OT) goal met  -EC                  User Key  (r) = Recorded By, (t) = Taken By, (c) = Cosigned By      Initials Name Effective Dates    EC Mary Lyon, OTR/L 10/13/23 -                      Occupational Therapy Education       Title: PT OT SLP Therapies (Done)       Topic: Occupational Therapy (Done)       Point: ADL training (Done)       Description:   Instruct learner(s) on proper safety adaptation and remediation techniques during self care or transfers.   Instruct in proper use of assistive devices.                  Learning Progress Summary             Patient Acceptance, E, VU by EC at 7/16/2024 1342    Acceptance, E,TB, VU by CR at 7/15/2024 2323    Acceptance, E, VU,NR by EC at 7/15/2024 1007   Family Acceptance, E,TB, VU by CR at 7/15/2024 2323   Significant Other Acceptance, E, VU by EC at 7/16/2024 1342    Acceptance, E, VU,NR by EC at 7/15/2024 1007                         Point: Home exercise program (Done)       Description:   Instruct learner(s) on appropriate technique for monitoring, assisting and/or progressing therapeutic exercises/activities.                  Learning Progress Summary             Patient Acceptance, E, VU by EC at 7/16/2024 1342    Acceptance, E,TB, VU by CR at 7/15/2024 2323   Family Acceptance, E,TB, VU by CR at 7/15/2024 2323   Significant Other Acceptance, E, VU by EC at 7/16/2024 1342                         Point: Precautions (Done)       Description:   Instruct learner(s) on prescribed precautions during self-care and functional transfers.                  Learning Progress Summary             Patient Acceptance, E, VU by EC at 7/16/2024 1342    Acceptance, E,TB, VU by CR at 7/15/2024 2323    Acceptance, E, VU,NR by EC at 7/15/2024 1007   Family Acceptance, E,TB, VU by CR at 7/15/2024 2323   Significant Other Acceptance, E, VU by EC at 7/16/2024 1342    Acceptance, E, VU,NR by EC at 7/15/2024 1007                          Point: Body mechanics (Done)       Description:   Instruct learner(s) on proper positioning and spine alignment during self-care, functional mobility activities and/or exercises.                  Learning Progress Summary             Patient Acceptance, E, VU by EC at 7/16/2024 1342    Acceptance, E,TB, VU by CR at 7/15/2024 2323    Acceptance, E, VU,NR by EC at 7/15/2024 1007   Family Acceptance, E,TB, VU by CR at 7/15/2024 2323   Significant Other Acceptance, E, VU by EC at 7/16/2024 1342    Acceptance, E, VU,NR by EC at 7/15/2024 1007                                         User Key       Initials Effective Dates Name Provider Type Discipline    CR 03/03/23 -  Sarthak Cisneros, RN Registered Nurse Nurse     10/13/23 -  Mary Lyon OTR/L Occupational Therapist OT                      OT Recommendation and Plan  Planned Therapy Interventions (OT): activity tolerance training, adaptive equipment training, BADL retraining, cognitive/visual perception retraining, functional balance retraining, neuromuscular control/coordination retraining, occupation/activity based interventions, patient/caregiver education/training, strengthening exercise, transfer/mobility retraining, ROM/therapeutic exercise  Therapy Frequency (OT): 3 times/wk  Plan of Care Review  Plan of Care Reviewed With: patient, spouse  Progress: improving  Outcome Evaluation: OT tx completed- pt has made significant improvements since initial eval in his ability to follow commands, converse appropriately, and sequence tasks. He was oriented to all but situation given the confusion over the last few days. Today he came to the EOB with SBA & donned his socks. He amb around the room with CGA  to retrieve items suctioned to wall high & low. No LOB noted. Pt was able to sequence 6 step tasks with only 1 verbal cue. OT goals updated to reflect current level of function and maximize his safety. Frequency reduced to 3x week d/t  significant improvements made. Anticipate d/c home w/ assist once medically stable.  Plan of Care Reviewed With: patient, spouse  Outcome Evaluation: OT tx completed- pt has made significant improvements since initial eval in his ability to follow commands, converse appropriately, and sequence tasks. He was oriented to all but situation given the confusion over the last few days. Today he came to the EOB with SBA & donned his socks. He amb around the room with CGA  to retrieve items suctioned to wall high & low. No LOB noted. Pt was able to sequence 6 step tasks with only 1 verbal cue. OT goals updated to reflect current level of function and maximize his safety. Frequency reduced to 3x week d/t significant improvements made. Anticipate d/c home w/ assist once medically stable.     Outcome Measures       Row Name 07/16/24 1300 07/15/24 1000          How much help from another is currently needed...    Putting on and taking off regular lower body clothing? 4  -EC 2  -EC     Bathing (including washing, rinsing, and drying) 3  -EC 2  -EC     Toileting (which includes using toilet bed pan or urinal) 4  -EC 2  -EC     Putting on and taking off regular upper body clothing 4  -EC 2  -EC     Taking care of personal grooming (such as brushing teeth) 4  -EC 3  -EC     Eating meals 4  -EC 3  -EC     AM-PAC 6 Clicks Score (OT) 23  -EC 14  -EC        Functional Assessment    Outcome Measure Options AM-PAC 6 Clicks Daily Activity (OT)  -EC AM-PAC 6 Clicks Daily Activity (OT)  -EC               User Key  (r) = Recorded By, (t) = Taken By, (c) = Cosigned By      Initials Name Provider Type    Mary Wilson, OTR/L Occupational Therapist                    Time Calculation:    Time Calculation- OT       Row Name 07/16/24 1034             Time Calculation- OT    OT Start Time 1010  -EC      OT Stop Time 1034  -EC      OT Time Calculation (min) 24 min  -EC      Total Timed Code Minutes- OT 24 minute(s)  -EC      OT Received On  07/16/24  -EC         Timed Charges    38775 - OT Therapeutic Activity Minutes 12  -EC      43503 - OT Self Care/Mgmt Minutes 12  -EC         Total Minutes    Timed Charges Total Minutes 24  -EC       Total Minutes 24  -EC                User Key  (r) = Recorded By, (t) = Taken By, (c) = Cosigned By      Initials Name Provider Type    EC Mary Lyon, OTR/L Occupational Therapist                  Therapy Charges for Today       Code Description Service Date Service Provider Modifiers Qty    79559118991 HC OT EVAL MOD COMPLEXITY 3 7/15/2024 Mary Lyon, OTR/L GO 1    75441770331 HC OT THERAPEUTIC ACT EA 15 MIN 7/16/2024 Mary Lyon, OTR/L GO 1    27814081654 HC OT SELF CARE/MGMT/TRAIN EA 15 MIN 7/16/2024 Mary Lyon, OTR/L GO 1                 Mary Lyon OTR/L  7/16/2024

## 2024-07-16 NOTE — THERAPY TREATMENT NOTE
Acute Care - Speech Language Pathology   Swallow Treatment Note [facility]     Patient Name: Ty Webb  : 1965  MRN: [id number]  Today's Date: 2024               Admit Date: 2024  Swallow follow up completed. The patient is overall improved. He is able to answer questions and is conversational this morning. Wife present at the bedside. They report no concerns with toleration of current diet. He completed trials with no overt s/s of aspiration. Functional rotary chew with solids.     He is OK to continue with regular diet and thin liquids. Will follow up 1 more time and sign off it no new concerns.     Will complete cognitive evaluation if not back to baseline.   Evy Montalvo MS CCC-SLP 2024 10:41 CDT    Visit Dx:     ICD-10-CM ICD-9-CM   1. Altered mental status, unspecified altered mental status type  R41.82 780.97   2. Dysphagia, unspecified type  R13.10 787.20   3. Impaired mobility [Z74.09]  Z74.09 799.89     Patient Active Problem List   Diagnosis    Essential hypertension    Gastroesophageal reflux disease without esophagitis    Mixed hyperlipidemia    Cough    Influenza B    Perineal pain    Altered mental status     Past Medical History:   Diagnosis Date    GERD (gastroesophageal reflux disease)     Hyperlipidemia     Hypertension      Past Surgical History:   Procedure Laterality Date    HERNIA REPAIR         SLP Recommendation and Plan     SLP Diet Recommendation: regular textures, thin liquids (07/15/24 1218)     SLP Rec. for Method of Medication Administration: meds whole, as tolerated (07/15/24 121)                                   Daily Summary of Progress (SLP): progress toward functional goals as expected (24 08)               Treatment Assessment (SLP): improved (24)  Treatment Assessment Comments (SLP): See note. (24)  Plan for Continued Treatment (SLP): continue treatment per plan of care (24)         Plan of Care Reviewed  With: patient, spouse  Progress: improving      SWALLOW EVALUATION (Last 72 Hours)       SLP Adult Swallow Evaluation       Row Name 07/16/24 0859 07/15/24 1218 07/15/24 0740 07/14/24 1125 07/13/24 1425       Rehab Evaluation    Document Type therapy note (daily note)  -MG therapy note (daily note)  -MG therapy note (daily note)  -MG therapy note (daily note)  -CS evaluation  -CS    Subjective Information no complaints  -MG no complaints  -MG -- no complaints  -CS no complaints  -CS    Patient Observations alert;cooperative;agree to therapy  -MG alert;cooperative;agree to therapy  -MG -- alert;poorly cooperative  -CS alert;poorly cooperative  -CS    Patient/Family/Caregiver Comments/Observations Wife present.  -MG Wife present.  -MG -- Wife present  -CS Son present  -CS    Session Not Performed -- -- unable to treat, medical status change  -MG -- --    Patient Effort good  -MG good  -MG -- poor  -CS poor  -CS    Comment -- -- EEG  -MG -- --    Symptoms Noted During/After Treatment none  -MG none  -MG -- -- --       General Information    Patient Profile Reviewed -- -- -- -- yes  -CS    Pertinent History Of Current Problem -- -- -- -- AMS, CT of head- No acute intracranial abnormality. Lumbar puncture complete.  -CS    Current Method of Nutrition -- -- -- -- NPO  -CS    Precautions/Limitations, Vision -- -- -- -- WFL;for purposes of eval  -CS    Precautions/Limitations, Hearing -- -- -- -- WFL;for purposes of eval  -CS    Prior Level of Function-Communication -- -- -- -- WFL  -CS    Prior Level of Function-Swallowing -- -- -- -- no diet consistency restrictions  -CS    Plans/Goals Discussed with -- -- -- -- patient and family  -CS    Barriers to Rehab -- -- -- -- cognitive status  -CS    Patient's Goals for Discharge -- -- -- -- patient did not state  -CS    Family Goals for Discharge -- -- -- -- family did not state  -CS       Pain    Additional Documentation Pain Scale: FACES Pre/Post-Treatment (Group)  -MG Pain  Scale: FACES Pre/Post-Treatment (Group)  -MG -- Pain Scale: FACES Pre/Post-Treatment (Group)  -CS Pain Scale: FACES Pre/Post-Treatment (Group)  -CS       Pain Scale: FACES Pre/Post-Treatment    Pain: FACES Scale, Pretreatment 0-->no hurt  -MG 0-->no hurt  -MG -- 0-->no hurt  -CS 0-->no hurt  -CS    Posttreatment Pain Rating 0-->no hurt  -MG 0-->no hurt  -MG -- 0-->no hurt  -CS 0-->no hurt  -CS       Oral Motor Structure and Function    Dentition Assessment -- -- -- -- natural, present and adequate  -CS    Secretion Management -- -- -- -- WNL/WFL  -CS    Mucosal Quality -- -- -- -- other (see comments)  CNA  -CS    Volitional Swallow -- -- -- -- unable to elicit  -CS    Volitional Cough -- -- -- -- unable to elicit  -CS       Oral Musculature and Cranial Nerve Assessment    Oral Motor General Assessment -- -- -- -- unable to assess  -CS       General Eating/Swallowing Observations    Respiratory Support Currently in Use -- -- -- -- room air  -CS       Clinical Swallow Eval    Clinical Swallow Evaluation Summary -- -- -- -- See note  -CS       SLP Evaluation Clinical Impression    SLP Swallowing Diagnosis -- -- -- -- severe;oral dysphagia;suspected pharyngeal dysphagia;R/O pharyngeal dysphagia  -CS    Functional Impact -- -- -- -- risk of aspiration/pneumonia;risk of malnutrition  -CS    Rehab Potential/Prognosis, Swallowing -- -- -- -- adequate, monitor progress closely  -CS    Swallow Criteria for Skilled Therapeutic Interventions Met -- -- -- -- demonstrates skilled criteria  -CS       SLP Treatment Clinical Impressions    Treatment Assessment (SLP) improved  -MG improved  -MG -- continued  -CS --    Treatment Assessment Comments (SLP) See note.  -MG See note  -MG -- -- --    Daily Summary of Progress (SLP) progress toward functional goals as expected  -MG progress toward functional goals is gradual  -MG -- progress toward functional goals is gradual  -CS --    Barriers to Overall Progress (SLP) -- Cognitive status   -MG -- Cognitive status  -CS --    Plan for Continued Treatment (SLP) continue treatment per plan of care  -MG goals adjusted to reflect functional improvements demonstrated  -MG -- continue treatment per plan of care  -CS --    Care Plan Review care plan/treatment goals reviewed  -MG care plan/treatment goals reviewed;risks/benefits reviewed;current/potential barriers reviewed;patient/other agree to care plan  -MG -- care plan/treatment goals reviewed  -CS --    Care Plan Review, Other Participant(s) spouse  -MG caregiver  JODY Downing  -MG -- spouse  -CS --       Recommendations    Therapy Frequency (Swallow) -- -- -- -- at least;3 days per week  -CS    Predicted Duration Therapy Intervention (Days) -- -- -- -- until discharge  -CS    SLP Diet Recommendation -- regular textures;thin liquids  -MG -- -- NPO  -CS    Oral Care Recommendations -- -- -- -- Oral Care BID/PRN  -CS    SLP Rec. for Method of Medication Administration -- meds whole;as tolerated  -MG -- -- meds via alternate route  -CS    Monitor for Signs of Aspiration -- -- -- -- yes;notify SLP if any concerns  -CS    Anticipated Discharge Disposition (SLP) -- -- -- -- unknown  -CS       Swallow Goals (SLP)    Swallow LTGs Swallow Long Term Goal (free text)  -MG Swallow Long Term Goal (free text)  -MG -- Swallow Long Term Goal (free text)  -CS Swallow Long Term Goal (free text)  -CS    Swallow STGs diet tolerance goal selection (SLP)  -MG diet tolerance goal selection (SLP)  -MG -- diet tolerance goal selection (SLP)  -CS diet tolerance goal selection (SLP)  -CS    Diet Tolerance Goal Selection (SLP) Patient will tolerate trials of  -MG Patient will tolerate trials of  -MG -- Patient will tolerate trials of  -CS Patient will tolerate trials of  -CS       (LTG) Swallow    (LTG) Swallow Pt will tolerate LRD w/o any overt s/s of aspiration.  -MG Pt will tolerate LRD w/o any overt s/s of aspiration.  -MG -- Pt will tolerate LRD w/o any overt s/s of aspiration.   -CS Pt will tolerate LRD w/o any overt s/s of aspiration.  -CS    [place] (Swallow Long Term Goal) with minimal cues (75-90% accuracy)  -MG with minimal cues (75-90% accuracy)  -MG -- with minimal cues (75-90% accuracy)  -CS with minimal cues (75-90% accuracy)  -CS    Time Frame (Swallow Long Term Goal) by discharge  -MG by discharge  -MG -- by discharge  -CS by discharge  -CS    Barriers (Swallow Long Term Goal) cog status  -MG cog status  -MG -- cog status  -CS cog status  -CS    Progress/Outcomes (Swallow Long Term Goal) continuing progress toward goal  -MG continuing progress toward goal  -MG -- goal ongoing  -CS new goal  -CS       (STG) Patient will tolerate trials of    Consistencies Trialed (Tolerate trials) regular textures;pureed textures;thin liquids;nectar/ mildly thick liquids;honey/ moderately thick liquids  -MG regular textures;pureed textures;thin liquids;nectar/ mildly thick liquids;honey/ moderately thick liquids  -MG -- regular textures;pureed textures;thin liquids;nectar/ mildly thick liquids;honey/ moderately thick liquids  -CS regular textures;pureed textures;thin liquids;nectar/ mildly thick liquids;honey/ moderately thick liquids  -CS    Desired Outcome (Tolerate trials) without signs/symptoms of aspiration;without signs of distress  -MG without signs/symptoms of aspiration;without signs of distress  -MG -- without signs/symptoms of aspiration;without signs of distress  -CS without signs/symptoms of aspiration;without signs of distress  -CS    [place] (Tolerate trials) with minimal cues (75-90% accuracy)  -MG with minimal cues (75-90% accuracy)  -MG -- with minimal cues (75-90% accuracy)  -CS with minimal cues (75-90% accuracy)  -CS    Time Frame (Tolerate trials) by discharge  -MG by discharge  -MG -- by discharge  -CS by discharge  -CS    Progress/Outcomes (Tolerate trials) continuing progress toward goal  -MG continuing progress toward goal  -MG -- goal ongoing  -CS new goal  -CS               User Key  (r) = Recorded By, (t) = Taken By, (c) = Cosigned By      Initials Name Effective Dates    CS Pee Michaud, [facility]-SLP 05/07/24 -     MG Evy Montalvo MS [facility]-SLP 07/11/23 -                     EDUCATION  The patient has been educated in the following areas:   Dysphagia (Swallowing Impairment) Oral Care/Hydration.        SLP GOALS       Row Name 07/16/24 0859 07/15/24 1218 07/14/24 1125       (LTG) Swallow    (LTG) Swallow Pt will tolerate LRD w/o any overt s/s of aspiration.  -MG Pt will tolerate LRD w/o any overt s/s of aspiration.  -MG Pt will tolerate LRD w/o any overt s/s of aspiration.  -CS    [place] (Swallow Long Term Goal) with minimal cues (75-90% accuracy)  -MG with minimal cues (75-90% accuracy)  -MG with minimal cues (75-90% accuracy)  -CS    Time Frame (Swallow Long Term Goal) by discharge  -MG by discharge  -MG by discharge  -CS    Barriers (Swallow Long Term Goal) cog status  -MG cog status  -MG cog status  -CS    Progress/Outcomes (Swallow Long Term Goal) continuing progress toward goal  -MG continuing progress toward goal  -MG goal ongoing  -CS       (STG) Patient will tolerate trials of    Consistencies Trialed (Tolerate trials) regular textures;pureed textures;thin liquids;nectar/ mildly thick liquids;honey/ moderately thick liquids  -MG regular textures;pureed textures;thin liquids;nectar/ mildly thick liquids;honey/ moderately thick liquids  -MG regular textures;pureed textures;thin liquids;nectar/ mildly thick liquids;honey/ moderately thick liquids  -CS    Desired Outcome (Tolerate trials) without signs/symptoms of aspiration;without signs of distress  -MG without signs/symptoms of aspiration;without signs of distress  -MG without signs/symptoms of aspiration;without signs of distress  -CS    [place] (Tolerate trials) with minimal cues (75-90% accuracy)  -MG with minimal cues (75-90% accuracy)  -MG with minimal cues (75-90% accuracy)  -CS    Time Frame  (Tolerate trials) by discharge  -MG by discharge  -MG by discharge  -CS    Progress/Outcomes (Tolerate trials) continuing progress toward goal  -MG continuing progress toward goal  -MG goal ongoing  -CS      Row Name 07/13/24 1425             (LTG) Swallow    (LTG) Swallow Pt will tolerate LRD w/o any overt s/s of aspiration.  -CS      [place] (Swallow Long Term Goal) with minimal cues (75-90% accuracy)  -CS      Time Frame (Swallow Long Term Goal) by discharge  -CS      Barriers (Swallow Long Term Goal) cog status  -CS      Progress/Outcomes (Swallow Long Term Goal) new goal  -CS         (STG) Patient will tolerate trials of    Consistencies Trialed (Tolerate trials) regular textures;pureed textures;thin liquids;nectar/ mildly thick liquids;honey/ moderately thick liquids  -CS      Desired Outcome (Tolerate trials) without signs/symptoms of aspiration;without signs of distress  -CS      [place] (Tolerate trials) with minimal cues (75-90% accuracy)  -CS      Time Frame (Tolerate trials) by discharge  -CS      Progress/Outcomes (Tolerate trials) new goal  -CS                User Key  (r) = Recorded By, (t) = Taken By, (c) = Cosigned By      Initials Name Provider Type    Pee Daigle, [facility]-SLP Speech and Language Pathologist    MG Evy Montalvo MS CCC-SLP Speech and Language Pathologist                         Time Calculation:    Time Calculation- SLP       Row Name 07/16/24 1040             Time Calculation- SLP    SLP Start Time 0859  -MG      SLP Stop Time 0927  -MG      SLP Time Calculation (min) 28 min  -MG      SLP Received On 07/16/24  -MG         Untimed Charges    57541-OX Treatment Swallow Minutes 28  -MG         Total Minutes    Untimed Charges Total Minutes 28  -MG       Total Minutes 28  -MG                User Key  (r) = Recorded By, (t) = Taken By, (c) = Cosigned By      Initials Name Provider Type    MG Evy Montalvo, MS CCC-SLP Speech and Language Pathologist                     Therapy Charges for Today       Code Description Service Date Service Provider Modifiers Qty    72153322922 HC ST TREATMENT SWALLOW 3 7/15/2024 Evy Montalvo, MS CCC-SLP GN 1    99506799862 HC ST TREATMENT SWALLOW 2 7/16/2024 Evy Montalvo, MS CCC-SLP GN 1                 Evy Montalvo MS CCC-SLP  7/16/2024

## 2024-07-16 NOTE — PROGRESS NOTES
[facility] Medicine Services  INPATIENT PROGRESS NOTE    Patient Name: Ty Webb  Date of Admission: 7/13/2024  Today's Date: 07/16/24  Length of Stay: 3  Primary Care Physician: Irineo Cunningham APRN    Subjective   Chief Complaint: Altered mental status.   HPI   Patient is oriented x 3.  Altered mental status improving.  Tmax 98.9.  T-current 97.8.  Blood pressure stable, afebrile.  White blood cells normal.  Hemoglobin is normal.  CT scan abdomen discussed with family.  Patient denies any abdomen pain.  Family wants something for cough.  Patient is walking the hallway.    Review of Systems   Constitutional:  Positive for activity change and appetite change. Negative for chills and fever.   HENT:  Negative for hearing loss, nosebleeds, tinnitus and trouble swallowing.    Eyes:  Negative for visual disturbance.   Respiratory:  Negative for cough, chest tightness, shortness of breath and wheezing.    Cardiovascular:  Negative for chest pain, palpitations and leg swelling.   Gastrointestinal:  Negative for abdominal distention, abdominal pain, blood in stool, constipation, diarrhea, nausea and vomiting.   Endocrine: Negative for cold intolerance, heat intolerance, polydipsia, polyphagia and polyuria.   Genitourinary:  Negative for decreased urine volume, difficulty urinating, dysuria, flank pain, frequency and hematuria.   Musculoskeletal:  Positive for arthralgias, gait problem and myalgias. Negative for joint swelling.   Skin:  Negative for rash.   Allergic/Immunologic: Negative for immunocompromised state.   Neurological:  Negative for dizziness, syncope, weakness, light-headedness and headaches.   Hematological:  Negative for adenopathy. Does not bruise/bleed easily.   Psychiatric/Behavioral:  Positive for confusion. Negative for sleep disturbance. The patient is not nervous/anxious.    All pertinent negatives and positives are as above. All other systems have been reviewed  and are negative unless otherwise stated.     Objective    Temp:  [97.7 °F (36.5 °C)-98.9 °F (37.2 °C)] 97.8 °F (36.6 °C)  Heart Rate:  [] 95  Resp:  [18-20] 18  BP: (144-157)/(88-98) 144/88  Physical Exam  Vitals and nursing note reviewed.   HENT:      Head: Normocephalic.   Eyes:      Conjunctiva/sclera: Conjunctivae normal.      Pupils: Pupils are equal, round, and reactive to light.   Cardiovascular:      Rate and Rhythm: Normal rate and regular rhythm.      Heart sounds: Normal heart sounds.   Pulmonary:      Effort: Pulmonary effort is normal. No respiratory distress.      Breath sounds: Normal breath sounds.   Abdominal:      General: Bowel sounds are normal. There is no distension.      Palpations: Abdomen is soft.      Tenderness: There is no abdominal tenderness.   Musculoskeletal:         General: No swelling.      Cervical back: Neck supple.   Skin:     General: Skin is warm and dry.      Capillary Refill: Capillary refill takes 2 to 3 seconds.      Findings: No rash.   Neurological:      Mental Status: He is alert.      Motor: Weakness present.      Coordination: Coordination abnormal.      Gait: Gait abnormal.   Psychiatric:         Mood and Affect: Mood normal.         Behavior: Behavior normal.       Results Review:  I have reviewed the labs, radiology results, and diagnostic studies.    Laboratory Data:   Results from last 7 days   Lab Units 07/16/24  0436 07/15/24  0418 07/13/24  1022   WBC 10*3/mm3 8.44 7.43 7.62   HEMOGLOBIN g/dL 15.5 16.0 15.7   HEMATOCRIT % 44.9 47.3 45.5   PLATELETS 10*3/mm3 206 160 160        Results from last 7 days   Lab Units 07/16/24  0436 07/15/24  0418 07/13/24  1022   SODIUM mmol/L 138 137 135*   POTASSIUM mmol/L 4.2 4.2 4.2   CHLORIDE mmol/L 102 100 98   CO2 mmol/L 23.0 26.0 26.0   BUN mg/dL 24* 18 14   CREATININE mg/dL 1.08 0.97 1.04   CALCIUM mg/dL 8.8 8.6 8.9   BILIRUBIN mg/dL 0.5  --  0.6   ALK PHOS U/L 60  --  66   ALT (SGPT) U/L 49*  --  31   AST (SGOT)  U/L 25  --  22   GLUCOSE mg/dL 181* 110* 156*       Culture Data:   Blood Culture   Date Value Ref Range Status   07/14/2024 No growth at 2 days  Preliminary   07/14/2024 No growth at 2 days  Preliminary       Radiology Data:   Imaging Results (Last 24 Hours)       Procedure Component Value Units Date/Time    CT Abdomen With Contrast [[id number]] Collected: 07/15/24 1723     Updated: 07/15/24 1731    Narrative:      EXAM/TECHNIQUE: CT abdomen with IV contrast     INDICATION: Abdominal pain, altered mental status     COMPARISON: None available.     DLP: 1177.83 mGy.cm. Automated exposure control was also utilized to  decrease patient radiation dose.     FINDINGS:     Bibasilar atelectasis.     No suspicious liver lesion. Cholelithiasis without evidence of  cholecystitis. No biliary ductal dilatation.     Pancreas and adrenal glands are unremarkable. Spleen is upper limits of  normal in size.     10 mm, 3 mm, and 4 mm left upper pole renal calculi. No hydronephrosis.  No solid renal mass.     Distal esophagus and stomach are unremarkable. Small duodenal  diverticulum. No small bowel distention or evidence of active small  bowel inflammation in the abdomen. Visualized portion of the colon is  unremarkable without evidence of wall thickening or pericolonic fat  stranding.     No ascites. Nonaneurysmal atherosclerotic abdominal aorta. Patent SMA.  No enlarged retroperitoneal or mesenteric abdominal lymph nodes.     No acute abdominal wall soft tissue abnormality. No acute osseous  finding.       Impression:         1.  No acute findings in the abdomen.     2.  Multiple nonobstructing left renal calculi measuring up to 10 mm. No  hydronephrosis.     3.  Cholelithiasis without evidence of cholecystitis.     4.  Bibasilar atelectasis.     This report was signed and finalized on 7/15/2024 5:27 PM by Dr. Marc Brown MD.               I have reviewed the patient's current medications.     Assessment/Plan    Assessment  Active Hospital Problems    Diagnosis     **Altered mental status     Essential hypertension     Gastroesophageal reflux disease without esophagitis     Mixed hyperlipidemia        Treatment Plan  Altered mental status.  Oriented x 1..  Acute encephalopathy.  Possible autoimmune encephalitis.  IV hydration.  Neurology consult.  . Geodon as needed.  Status post lumbar puncture 7/13/2024.  No evidence infection at this time.  High-dose steroids started by neurology.  MRI of the brain-Extensive paranasal sinus mucosal thickening, No acute intracranial abnormality identified.  UDS-negative.  EEG-Diffuse cerebral dysfunction of mild degree but nonspecific-  is most commonly seen due to toxic/metabolic cause, No evidence for epilepsy is seen on the study.     Sinusitis/cough.  Rocephin.  Tessalon Perles as needed.  Chest x-ray-No active disease in the chest.   Patient is on room air.     Hyperlipidemia/hypertension.  Echocardiogram-ejection fraction 56 to 60%, saline test negative, no obvious valvular abnormality identified in this study.     Reflux.  Zofran as needed.    Abdomen pain/multiple nonobstructing renal calculi measuring up to 10 mm/cholelithiasis.  Nontender abdomen.  CT scan of abdomen-  No acute findings in the abdomen,  Multiple nonobstructing left renal calculi measuring up to 10 mm- No  Hydronephrosis, Cholelithiasis without evidence of cholecystitis,  Bibasilar atelectasis.     Hemoglobin A1c 6.5.  Diabetic educator.     Nutrition . N.p.o. for now due to dysphagia.     Deconditioning.  PT and OT consult.     Blood cultures-no growth for 2 days.  West Nile is pending.  Varicella negative.  Meningitis panel-negative.  COVID-19-negative.  Flu screen-negative.     Medical Decision Making  Number and Complexity of problems: Altered mental status/sinus/hypertension/hyperlipidemia  Differential Diagnosis: None     Conditions and Status        Condition is unchanged.     [facility] Data  External  documents reviewed: Previous note  Cardiac tracing (EKG, telemetry) interpretation: Sinus  Radiology interpretation: MRI/EEG/chest x-ray  Labs reviewed: Laboratory  Any tests that were considered but not ordered: Lab in a.m.     Decision rules/scores evaluated (example [id number], [place], etc): None     Discussed with: Patient      Care Planning  Shared decision making: Patient  Code status and discussions: Full code     Disposition  Social Determinants of Health that impact treatment or disposition: From home  1 to 3 days    Electronically signed by Leobardo Cerna MD, 07/16/24, 14:31 CDT.

## 2024-07-17 LAB
AMPAR1 AB CSF QL CBA IFA: NEGATIVE
AMPAR2 IGG CSF QL CBA IFA: NEGATIVE
AMPHIPHYSIN AB CSF QL IF: NEGATIVE
AMPHIPHYSIN AB CSF QL IF: NEGATIVE
ANION GAP SERPL CALCULATED.3IONS-SCNC: 10 MMOL/L (ref 5–15)
BUN SERPL-MCNC: 25 MG/DL (ref 8–23)
BUN/CREAT SERPL: 24.3 (ref 7–25)
CALCIUM SPEC-SCNC: 8.7 MG/DL (ref 8.6–10.5)
CASPR2 AB CSF QL CBA IFA: NEGATIVE
CASPR2 AB CSF QL CBA IFA: NEGATIVE
CHLORIDE SERPL-SCNC: 104 MMOL/L (ref 98–107)
CO2 SERPL-SCNC: 24 MMOL/L (ref 22–29)
CREAT SERPL-MCNC: 1.03 MG/DL (ref 0.76–1.27)
CV2 AB CSF QL IF: NEGATIVE
CV2 AB CSF QL IF: NEGATIVE
DEPRECATED RDW RBC AUTO: 42.5 FL (ref 37–54)
DPPX IGG CSF QL IF: NEGATIVE
EGFRCR SERPLBLD CKD-EPI 2021: 83.2 ML/MIN/1.73
ERYTHROCYTE [DISTWIDTH] IN BLOOD BY AUTOMATED COUNT: 13.4 % (ref 12.3–15.4)
GABABR IGG CSF QL CBA IFA: NEGATIVE
GAD65 AB CSF IA-SCNC: NEGATIVE NMOL/L
GLIAL NUC TYPE 1 AB CSF QL IF: NEGATIVE
GLIAL NUC TYPE 1 AB CSF QL IF: NEGATIVE
GLUCOSE SERPL-MCNC: 159 MG/DL (ref 65–99)
HCT VFR BLD AUTO: 40.7 % (ref 37.5–51)
HGB BLD-MCNC: 13.6 G/DL (ref 13–17.7)
HU1 AB CSF QL IF: NEGATIVE
HU1 AB CSF QL IF: NEGATIVE
HU2 AB CSF QL IF: NEGATIVE
HU2 AB CSF QL IF: NEGATIVE
HU3 AB CSF QL IF: NEGATIVE
HU3 AB CSF QL IF: NEGATIVE
IGLON5 IGG CSF QL CBA IFA: NEGATIVE
IMP & REVIEW OF LAB RESULTS: NEGATIVE
IMP & REVIEW OF LAB RESULTS: NEGATIVE
IP3R 1 IGG CSF QL IF: NEGATIVE
LGI1 AB CSF QL CBA IFA: NEGATIVE
LGI1 AB CSF QL CBA IFA: NEGATIVE
MA+TA AB CSF QL IF: NEGATIVE
MCH RBC QN AUTO: 29.1 PG (ref 26.6–33)
MCHC RBC AUTO-ENTMCNC: 33.4 G/DL (ref 31.5–35.7)
MCV RBC AUTO: 87 FL (ref 79–97)
MGLUR1 IGG CSF QL CBA IFA: NEGATIVE
NMDAR IGG CSF QL IF: NEGATIVE
NMDAR IGG CSF QL IF: NEGATIVE
PCA-2 AB CSF QL IF: NEGATIVE
PCA-2 AB CSF QL IF: NEGATIVE
PCA-TR AB CSF QL CBA IFA: NEGATIVE
PCA-TR AB CSF QL IF: NEGATIVE
PCA-TR AB CSF QL IF: NEGATIVE
PLATELET # BLD AUTO: 209 10*3/MM3 (ref 140–450)
PMV BLD AUTO: 10.6 FL (ref 6–12)
POTASSIUM SERPL-SCNC: 4.6 MMOL/L (ref 3.5–5.2)
PURKINJE CELLS AB CSF QL IF: NEGATIVE
PURKINJE CELLS AB CSF QL IF: NEGATIVE
RBC # BLD AUTO: 4.68 10*6/MM3 (ref 4.14–5.8)
SODIUM SERPL-SCNC: 138 MMOL/L (ref 136–145)
VIT B1 BLD-SCNC: 113.8 NMOL/L (ref 66.5–200)
WBC NRBC COR # BLD AUTO: 14.4 10*3/MM3 (ref 3.4–10.8)
WNV IGG SPEC QL IA: NEGATIVE
WNV IGM CSF QL IA: NEGATIVE
ZIC4 AB SER QL: NEGATIVE

## 2024-07-17 PROCEDURE — 25010000002 METHYLPREDNISOLONE PER 125 MG: Performed by: STUDENT IN AN ORGANIZED HEALTH CARE EDUCATION/TRAINING PROGRAM

## 2024-07-17 PROCEDURE — 92526 ORAL FUNCTION THERAPY: CPT | Performed by: SPEECH-LANGUAGE PATHOLOGIST

## 2024-07-17 PROCEDURE — 85027 COMPLETE CBC AUTOMATED: CPT | Performed by: FAMILY MEDICINE

## 2024-07-17 PROCEDURE — 80048 BASIC METABOLIC PNL TOTAL CA: CPT | Performed by: FAMILY MEDICINE

## 2024-07-17 PROCEDURE — 97530 THERAPEUTIC ACTIVITIES: CPT

## 2024-07-17 PROCEDURE — 25810000003 SODIUM CHLORIDE 0.9 % SOLUTION: Performed by: INTERNAL MEDICINE

## 2024-07-17 PROCEDURE — 99232 SBSQ HOSP IP/OBS MODERATE 35: CPT | Performed by: STUDENT IN AN ORGANIZED HEALTH CARE EDUCATION/TRAINING PROGRAM

## 2024-07-17 PROCEDURE — 25010000002 CEFTRIAXONE PER 250 MG: Performed by: FAMILY MEDICINE

## 2024-07-17 RX ORDER — PANTOPRAZOLE SODIUM 40 MG/1
40 TABLET, DELAYED RELEASE ORAL 2 TIMES DAILY WITH MEALS
Status: DISCONTINUED | OUTPATIENT
Start: 2024-07-17 | End: 2024-07-19 | Stop reason: HOSPADM

## 2024-07-17 RX ORDER — CARVEDILOL 25 MG/1
25 TABLET ORAL 2 TIMES DAILY WITH MEALS
Status: DISCONTINUED | OUTPATIENT
Start: 2024-07-17 | End: 2024-07-17

## 2024-07-17 RX ORDER — CARVEDILOL 6.25 MG/1
6.25 TABLET ORAL 2 TIMES DAILY WITH MEALS
Status: DISCONTINUED | OUTPATIENT
Start: 2024-07-17 | End: 2024-07-19 | Stop reason: HOSPADM

## 2024-07-17 RX ORDER — LISINOPRIL 10 MG/1
10 TABLET ORAL DAILY
Status: DISCONTINUED | OUTPATIENT
Start: 2024-07-17 | End: 2024-07-18

## 2024-07-17 RX ADMIN — LISINOPRIL 10 MG: 10 TABLET ORAL at 11:40

## 2024-07-17 RX ADMIN — SODIUM CHLORIDE 100 ML/HR: 9 INJECTION, SOLUTION INTRAVENOUS at 10:00

## 2024-07-17 RX ADMIN — Medication 5 MG: at 00:23

## 2024-07-17 RX ADMIN — CARVEDILOL 6.25 MG: 6.25 TABLET, FILM COATED ORAL at 11:40

## 2024-07-17 RX ADMIN — Medication 10 ML: at 20:24

## 2024-07-17 RX ADMIN — BENZONATATE 200 MG: 100 CAPSULE ORAL at 17:34

## 2024-07-17 RX ADMIN — PANTOPRAZOLE SODIUM 40 MG: 40 TABLET, DELAYED RELEASE ORAL at 17:46

## 2024-07-17 RX ADMIN — ASPIRIN 325 MG: 325 TABLET, FILM COATED ORAL at 09:56

## 2024-07-17 RX ADMIN — ATORVASTATIN CALCIUM 80 MG: 40 TABLET ORAL at 20:24

## 2024-07-17 RX ADMIN — Medication 10 ML: at 09:56

## 2024-07-17 RX ADMIN — SODIUM CHLORIDE 1000 MG: 9 INJECTION, SOLUTION INTRAVENOUS at 09:56

## 2024-07-17 RX ADMIN — CARVEDILOL 6.25 MG: 6.25 TABLET, FILM COATED ORAL at 17:46

## 2024-07-17 RX ADMIN — SODIUM CHLORIDE 1000 MG: 900 INJECTION INTRAVENOUS at 09:56

## 2024-07-17 RX ADMIN — BENZONATATE 200 MG: 100 CAPSULE ORAL at 04:50

## 2024-07-17 RX ADMIN — Medication 5 MG: at 22:25

## 2024-07-17 NOTE — PLAN OF CARE
Goal Outcome Evaluation:  Plan of Care Reviewed With: patient, spouse        Progress: improving  Outcome Evaluation: OT treatment completed. Pt A&Ox4; no c/o pain. Pt demo independence for functional transfers and SBA-CGA for functional mobility for safety awareness/fall prevention. Pt completed sequencing activity with 4-6 step ADLs x 2 and did not require any verbal cues for problem-solving/sequencing. Pt completed functional mobility in room with SBA during dynamic balance/reaching activity with items on wall in high/low areas. No LOB or SOB noted during activities. OT to continue POC.

## 2024-07-17 NOTE — THERAPY TREATMENT NOTE
Patient Name: Ty Webb  : 1965    MRN: [id number]                              Today's Date: 2024       Admit Date: 2024    Visit Dx:     ICD-10-CM ICD-9-CM   1. Altered mental status, unspecified altered mental status type  R41.82 780.97   2. Dysphagia, unspecified type  R13.10 787.20   3. Impaired mobility [Z74.09]  Z74.09 799.89     Patient Active Problem List   Diagnosis    Essential hypertension    Gastroesophageal reflux disease without esophagitis    Mixed hyperlipidemia    Cough    Influenza B    Perineal pain    Altered mental status     Past Medical History:   Diagnosis Date    GERD (gastroesophageal reflux disease)     Hyperlipidemia     Hypertension      Past Surgical History:   Procedure Laterality Date    HERNIA REPAIR        General Information       Row Name 24 1105          OT Time and Intention    Document Type therapy note (daily note)  -CS (r) HH (t) CS (c)     Mode of Treatment occupational therapy  -CS (r) HH (t) CS (c)       Row Name 24 1105          General Information    Patient Profile Reviewed yes  -CS (r) HH (t) CS (c)     Existing Precautions/Restrictions fall  -CS (r) HH (t) CS (c)       Row Name 24 1105          Cognition    Orientation Status (Cognition) oriented x 4  -CS (r) HH (t) CS (c)       Row Name 24 1105          Safety Issues, Functional Mobility    Impairments Affecting Function (Mobility) balance;endurance/activity tolerance;cognition  -CS (r) HH (t) CS (c)     Cognitive Impairments, Mobility Safety/Performance insight into deficits/self-awareness;awareness, need for assistance;problem-solving/reasoning;sequencing abilities  -CS (r) HH (t) CS (c)               User Key  (r) = Recorded By, (t) = Taken By, (c) = Cosigned By      Initials Name Provider Type    CS Kaleigh Patel, OTR/L, CNT Occupational Therapist    HH Nanyc Luther OT Student OT Student                     Mobility/ADL's       Row Name 24 1105          Bed  Mobility    Bed Mobility supine-sit;sit-supine  -CS (r) HH (t) CS (c)     Supine-Sit [place] (Bed Mobility) independent  -CS (r) HH (t) CS (c)     Sit-Supine [place] (Bed Mobility) independent  -CS (r) HH (t) CS (c)     Assistive Device (Bed Mobility) head of bed elevated  -CS (r) HH (t) CS (c)       Row Name 07/17/24 1105          Transfers    Transfers sit-stand transfer;stand-sit transfer  -CS (r) HH (t) CS (c)       Row Name 07/17/24 1105          Sit-Stand Transfer    Sit-Stand [place] (Transfers) supervision  -CS (r) HH (t) CS (c)       Row Name 07/17/24 1105          Stand-Sit Transfer    Stand-Sit [place] (Transfers) supervision  -CS (r) HH (t) CS (c)       Row Name 07/17/24 1105          Functional Mobility    Functional Mobility- Ind. Level standby assist  -CS (r) HH (t) CS (c)               User Key  (r) = Recorded By, (t) = Taken By, (c) = Cosigned By      Initials Name Provider Type    CS Kaleigh Patel, OTR/L, CNT Occupational Therapist    HH Nancy Luther, OT Student OT Student                   Obj/Interventions       Row Name 07/17/24 1105          Balance    Balance Assessment sitting static balance;sitting dynamic balance;sit to stand dynamic balance;standing static balance;standing dynamic balance  -CS (r) HH (t) CS (c)     Static Sitting Balance independent  -CS (r) HH (t) CS (c)     Dynamic Sitting Balance standby assist  -CS (r) HH (t) CS (c)     Position, Sitting Balance sitting edge of bed  -CS (r) HH (t) CS (c)     Sit to Stand Dynamic Balance supervision  -CS (r) HH (t) CS (c)     Static Standing Balance standby assist  -CS (r) HH (t) CS (c)     Dynamic Standing Balance standby assist;contact guard  -CS (r) HH (t) CS (c)     Balance Interventions sitting;standing;sit to stand;static;dynamic;occupation based/functional task  -CS (r) HH (t) CS (c)               User Key  (r) = Recorded By, (t) = Taken By, (c) = Cosigned By      Initials Name Provider Type    STEFANO Patel,  Kaleigh REYES, OTR/L, VERÓNICA Occupational Therapist    Nancy Clement, OT Student OT Student                   Goals/Plan    No documentation.                  Clinical Impression       Row Name 07/17/24 1105          Pain Assessment    Pretreatment Pain Rating 0/10 - no pain  -CS (r) HH (t) CS (c)     Posttreatment Pain Rating 0/10 - no pain  -CS (r) HH (t) CS (c)       Row Name 07/17/24 1105          Plan of Care Review    Plan of Care Reviewed With patient;spouse  -CS (r) HH (t) CS (c)     Progress improving  -CS (r) HH (t) CS (c)     Outcome Evaluation OT treatment completed. Pt A&Ox4; no c/o pain. Pt demo independence for functional transfers and SBA-CGA for functional mobility for safety awareness/fall prevention. Pt completed sequencing activity with 4-6 step ADLs x 2 and did not require any verbal cues for problem-solving/sequencing. Pt completed functional mobility in room with SBA during dynamic balance/reaching activity with items on wall in high/low areas. No LOB or SOB noted during activities. OT to continue POC.  -CS (r) HH (t) CS (c)       Row Name 07/17/24 1105          Positioning and Restraints    Pre-Treatment Position in bed  -CS (r) HH (t) CS (c)     Post Treatment Position bed  -CS (r) HH (t) CS (c)     In Bed supine;call light within reach;encouraged to call for assist;with family/caregiver  -CS (r) HH (t) CS (c)               User Key  (r) = Recorded By, (t) = Taken By, (c) = Cosigned By      Initials Name Provider Type    Kaleigh Guzman, OTR/L, CNT Occupational Therapist    Nancy Clement, OT Student OT Student                   Outcome Measures       Row Name 07/17/24 1105          How much help from another is currently needed...    Putting on and taking off regular lower body clothing? 4  -CS (r) HH (t) CS (c)     Bathing (including washing, rinsing, and drying) 4  -CS (r) HH (t) CS (c)     Toileting (which includes using toilet bed pan or urinal) 4  -CS (r) HH (t) CS (c)     Putting  on and taking off regular upper body clothing 4  -CS (r) HH (t) CS (c)     Taking care of personal grooming (such as brushing teeth) 4  -CS (r) HH (t) CS (c)     Eating meals 4  -CS (r) HH (t) CS (c)     AM-PAC 6 Clicks Score (OT) 24  -CS (r) HH (t)       Row Name 07/17/24 1108          Functional Assessment    Outcome Measure Options AM-PAC 6 Clicks Daily Activity (OT)  -CS (r) HH (t) CS (c)               User Key  (r) = Recorded By, (t) = Taken By, (c) = Cosigned By      Initials Name Provider Type    CS Kaleigh Patel, OTR/L, CNT Occupational Therapist    Nancy Clement, OT Student OT Student                    Occupational Therapy Education       Title: PT OT SLP Therapies (Done)       Topic: Occupational Therapy (Done)       Point: ADL training (Done)       Description:   Instruct learner(s) on proper safety adaptation and remediation techniques during self care or transfers.   Instruct in proper use of assistive devices.                  Learning Progress Summary             Patient Acceptance, E, VU by  at 7/17/2024 1148    Acceptance, E, VU by EC at 7/16/2024 1342    Acceptance, E,TB, VU by CR at 7/15/2024 2323    Acceptance, E, VU,NR by EC at 7/15/2024 1007   Family Acceptance, E,TB, VU by CR at 7/15/2024 2323   Significant Other Acceptance, E, VU by EC at 7/16/2024 1342    Acceptance, E, VU,NR by EC at 7/15/2024 1007                         Point: Home exercise program (Done)       Description:   Instruct learner(s) on appropriate technique for monitoring, assisting and/or progressing therapeutic exercises/activities.                  Learning Progress Summary             Patient Acceptance, E, VU by  at 7/17/2024 1148    Acceptance, E, VU by EC at 7/16/2024 1342    Acceptance, E,TB, VU by CR at 7/15/2024 2323   Family Acceptance, E,TB, VU by CR at 7/15/2024 2323   Significant Other Acceptance, E, VU by EC at 7/16/2024 1342                         Point: Precautions (Done)       Description:    Instruct learner(s) on prescribed precautions during self-care and functional transfers.                  Learning Progress Summary             Patient Acceptance, E, VU by  at 7/17/2024 1148    Acceptance, E, VU by EC at 7/16/2024 1342    Acceptance, E,TB, VU by CR at 7/15/2024 2323    Acceptance, E, VU,NR by EC at 7/15/2024 1007   Family Acceptance, E,TB, VU by CR at 7/15/2024 2323   Significant Other Acceptance, E, VU by EC at 7/16/2024 1342    Acceptance, E, VU,NR by EC at 7/15/2024 1007                         Point: Body mechanics (Done)       Description:   Instruct learner(s) on proper positioning and spine alignment during self-care, functional mobility activities and/or exercises.                  Learning Progress Summary             Patient Acceptance, E, VU by  at 7/17/2024 1148    Acceptance, E, VU by EC at 7/16/2024 1342    Acceptance, E,TB, VU by CR at 7/15/2024 2323    Acceptance, E, VU,NR by EC at 7/15/2024 1007   Family Acceptance, E,TB, VU by CR at 7/15/2024 2323   Significant Other Acceptance, E, VU by EC at 7/16/2024 1342    Acceptance, E, VU,NR by EC at 7/15/2024 1007                                         User Key       Initials Effective Dates Name Provider Type Discipline     03/03/23 -  Sarthak Cisneros, RN Registered Nurse Nurse     10/13/23 -  Mary Lyon, OTR/L Occupational Therapist OT     04/15/24 -  Nancy Luther OT Student OT Student OT                  OT Recommendation and Plan     Plan of Care Review  Plan of Care Reviewed With: patient, spouse  Progress: improving  Outcome Evaluation: OT treatment completed. Pt A&Ox4; no c/o pain. Pt demo independence for functional transfers and SBA-CGA for functional mobility for safety awareness/fall prevention. Pt completed sequencing activity with 4-6 step ADLs x 2 and did not require any verbal cues for problem-solving/sequencing. Pt completed functional mobility in room with SBA during dynamic balance/reaching activity  with items on wall in high/low areas. No LOB or SOB noted during activities. OT to continue POC.     Time Calculation:         Time Calculation- OT       Row Name 07/17/24 1148             Time Calculation- OT    OT Start Time 1105  -CS (r) HH (t) CS (c)      OT Stop Time 1135  -CS (r) HH (t) CS (c)      OT Time Calculation (min) 30 min  -CS (r) HH (t)      Total Timed Code Minutes- OT 30 minute(s)  -CS (r) HH (t) CS (c)      OT Received On 07/17/24  -CS (r) HH (t) CS (c)         Timed Charges    51108 - OT Therapeutic Activity Minutes 30  -CS (r) HH (t) CS (c)         Total Minutes    Timed Charges Total Minutes 30  -CS (r) HH (t)       Total Minutes 30  -CS (r) HH (t)                User Key  (r) = Recorded By, (t) = Taken By, (c) = Cosigned By      Initials Name Provider Type    CS Kaleigh Patel, OTR/L, CNT Occupational Therapist    Nancy Clement OT Student OT Student                           Nancy Luther OT Student  7/17/2024

## 2024-07-17 NOTE — PLAN OF CARE
Goal Outcome Evaluation:           Progress: improving  Outcome Evaluation: Pt A&Ox4, room air, tele D/C'd today, up with x1 assist to BR/ambulating in hallway. Wife at bedside, attentive to pt, assists with ADL's. Had shower today. Call light within reach. No c/o pain.

## 2024-07-17 NOTE — PLAN OF CARE
Goal Outcome Evaluation:  Plan of Care Reviewed With: patient, spouse        Progress: improving                       Treatment Assessment (SLP): improved (07/17/24 0135)  Treatment Assessment Comments (SLP): See note (07/17/24 0135)  Plan for Continued Treatment (SLP): treatment no longer indicated as all goals met (07/17/24 0135)      Swallow follow up completed. The patient was alert and cooperative. Wife present. Patient reports great appetite and no concerns with toleration of current diet. He completed trials with no difficulty.     Extensive discussion regarding cognitive status. The patient and his wife feel he is back to baseline. He does have a demanding job at [employer]. His wife is a bit worried about his ability to go back quickly. We discussed screening for cognitive concerns once in the home environment. I encouraged follow up with neurology and discussion with MD for OP referral for speech evaluation for cognition if any concerns persist at home. They both verbalized understanding.     SLP signing on at this time.     Evy Montalvo, MS CCC-SLP 7/17/2024 08:07 CDT

## 2024-07-17 NOTE — PROGRESS NOTES
Neurology Progress Note      Chief Complaint:  Encephalopathy  Length of Stay:  4     Interval     7/17: Simlar improvement in neurological exam as yesterday.  Received D3 of IV Methylpred 1 g.  Blood cultures from 7/14 no growth to date.  Specialized labs pending.    7/16: Significant improvement in neurological exam.  Received D2 of IV Methylpred 1 g.  Blood cultures from 7/14 no growth to date.  Specialized labs pending.  EEG from 715 with diffuse cerebral dysfunction of mild degree.    7/15: Improvement in his mental status as fever improved.  D1 of ceftriaxone for severe sinusitis.  D1 of IV methylprednisolone 1 g daily for 5 days.  Blood cultures from 7/14 no growth to date.  Specialized labs pending.    7/13-14: Lumbar puncture performed, initial labs remarkable for mild pleocytosis and mild elevation of protein.  Meningitis/encephalitis panel PCR negative, West Nile PCR negative, VZV PCR negative, blood cultures drawn.  B12 and folate normal.  TSH normal.  UDS negative.  Specialized lab sent: MS propofol and MBP, NMDA and CSF, encephalopathy panel, ACE, paraneoplastic panel, thiamine, NMO IgG.      Subjective     Subjective:  Seen at bedside with wife and parents-in-law, who reported significant neurological recovery from yesterday continues.  Patient is in good spirits no complaints.    Medications:  Current Facility-Administered Medications   Medication Dose Route Frequency Provider Last Rate Last Admin    acetaminophen (TYLENOL) tablet 650 mg  650 mg Oral Q6H PRN Andrew Corrigan MD        Or    acetaminophen (TYLENOL) suppository 650 mg  650 mg Rectal Q6H PRN Andrew Corrigan MD   650 mg at 07/14/24 1403    aspirin tablet 325 mg  325 mg Oral Daily Andrew Corrigan MD   325 mg at 07/17/24 0956    Or    aspirin suppository 300 mg  300 mg Rectal Daily Andrew Corrigan MD   300 mg at 07/13/24 1701    atorvastatin (LIPITOR) tablet 80 mg  80 mg Oral Nightly Andrew Corrigan MD   80 mg at 07/16/24 2008     benzonatate (TESSALON) capsule 200 mg  200 mg Oral TID PRN Leobardo Cerna MD   200 mg at 07/17/24 0450    carvedilol (COREG) tablet 6.25 mg  6.25 mg Oral BID With Meals Leobardo Cerna MD   6.25 mg at 07/17/24 1140    cefTRIAXone (ROCEPHIN) 1,000 mg in sodium chloride 0.9 % 100 mL MBP  1,000 mg Intravenous Q24H Leobardo Cerna  mL/hr at 07/17/24 0956 1,000 mg at 07/17/24 0956    lisinopril (PRINIVIL,ZESTRIL) tablet 10 mg  10 mg Oral Daily Leobardo Cerna MD   10 mg at 07/17/24 1140    melatonin tablet 5 mg  5 mg Oral Nightly PRN Kishore Key MD   5 mg at 07/17/24 0023    methylPREDNISolone sodium succinate (SOLU-Medrol) 1,000 mg in sodium chloride 0.9 % 100 mL IVPB  1,000 mg Intravenous Daily Kishore Key  mL/hr at 07/17/24 0956 1,000 mg at 07/17/24 0956    Followed by    [START ON 7/20/2024] predniSONE (DELTASONE) tablet 60 mg  60 mg Oral Daily With Breakfast Kishore Key MD        Followed by    [START ON 8/3/2024] predniSONE (DELTASONE) tablet 50 mg  50 mg Oral Daily With Breakfast Kishore Key MD        Followed by    [START ON 8/17/2024] predniSONE (DELTASONE) tablet 40 mg  40 mg Oral Daily With Breakfast Kishore Key MD        Followed by    [START ON 8/31/2024] predniSONE (DELTASONE) tablet 30 mg  30 mg Oral Daily With Breakfast Kishore Key MD        Followed by    [START ON 9/14/2024] predniSONE (DELTASONE) tablet 20 mg  20 mg Oral Daily With Breakfast Kishore Key MD        ondansetron (ZOFRAN) injection 4 mg  4 mg Intravenous Q6H PRN Leobardo Cerna MD        sodium chloride 0.9 % flush 10 mL  10 mL Intravenous Q12H Koleilat, Andrew, MD   10 mL at 07/17/24 0956    sodium chloride 0.9 % flush 10 mL  10 mL Intravenous PRN Andrew Corrigan MD        sodium chloride 0.9 % infusion 40 mL  40 mL Intravenous PRN Andrew Corrigan MD        ziprasidone (GEODON) injection 10 mg  10 mg Intramuscular Q6H PRN Andrew Corrigan MD   10 mg  at 07/15/24 1226             Objective      Vital Signs  Temp:  [97.6 °F (36.4 °C)-98.6 °F (37 °C)] 97.9 °F (36.6 °C)  Heart Rate:  [78-97] 78  Resp:  [16-19] 16  BP: (148-158)/(85-98) 148/85    Physical Exam:    No evidence of neck stiffness  No evidence of photophobia.  The lights are on bright room and the patient does not show any concern in regards to this.  No signs that the patient is in pain nor has a headache    Pertinent Neuro Exam:  Awake.  With reduced verbal output however able to form sentences that are complex.  Follows simple and complex commands, is able to repeat complex sentences, no word finding difficulty even with low-frequency words.  Able to estimate 7 quarters in $1.75, able to say the days of the week backwards without hesitation, some difficulty naming the months of the year backwards with skipping 2 months although faster than yesterday.  Mild difficulty with serial sevens.  Unable to perform any Z fingers.  Cranial nerves II to XII intact  Moving all extremities with great strength  No pathologic reflexes  No signs of gross ataxia      Last nurse assessment:  Interval:  (handoff)  1a. Level of Consciousness: 0-->Alert, keenly responsive  1b. LOC Questions: 0-->Answers both questions correctly  1c. LOC Commands: 0-->Performs both tasks correctly  2. Best Gaze: 0-->Normal  3. Visual: 0-->No visual loss  4. Facial Palsy: 0-->Normal symmetrical movements  5a. Motor Arm, Left: 0-->No drift, limb holds 90 (or 45) degrees for full 10 secs  5b. Motor Arm, Right: 0-->No drift, limb holds 90 (or 45) degrees for full 10 secs  6a. Motor Leg, Left: 0-->No drift, leg holds 30 degree position for full 5 secs  6b. Motor Leg, Right: 0-->No drift, leg holds 30 degree position for full 5 secs  7. Limb Ataxia: 1-->Present in one limb  8. Sensory: 0-->Normal, no sensory loss  9. Best Language: 1-->Mild-to-moderate aphasia, some obvious loss of fluency or facility of comprehension, without significant  limitation on ideas expressed or form of expression. Reduction of speech and/or comprehension, however, makes conversation. . . (see row details)  10. Dysarthria: 0-->Normal  11. Extinction and Inattention (formerly Neglect): 0-->No abnormality    Total (NIH Stroke Scale): 2       Results Review:      Labs:  Lumbar puncture results:  White cells 22  Red cells 67  Protein 57.3  Glucose 79  Meningitis/encephalitis panel normal  [place] Western blot pending  West Nile virus pending  Paraneoplastic panel pending  ACE level pending  Autoimmune encephalitis panel pending  NMDA receptor pending  MS panel pending  Ammonia level normal  Folate level normal  TSH level normal  B12 level normal  B1 level pending    Imaging:  Head CT without contrast reviewed by me showing no acute findings  MRI brain with and without contrast 7/14: With extensive paranasal sinus mucosal thickening, however no acute intracranial abnormality and no contrast-enhancement.  EEG 7/15: With diffuse cerebral dysfunction, but no epileptiform discharges.    Assessment/Plan     Hospital Problem List      Altered mental status    Essential hypertension    Gastroesophageal reflux disease without esophagitis    Mixed hyperlipidemia    Impression:  Altered mentation  Current testing has ruled out an infectious etiology.  No signs of a bacterial nor viral meningitis/encephalitis  Current working diagnoses is an autoimmune encephalitis      Plan:  IV methylprednisolone 1 g daily for 5 days followed by prednisone 60 mg p.o. for 2 weeks follow-up with slow taper of reducing 10 mg every 2 weeks, until reaching dose of 20 mg daily and then further steroid depending on neurology appointment.    Continue following up on lumbar puncture results  Appreciate internal medicine being the primary attending.  Please continue to trend blood glucose levels if we initiate high-dose steroids as these may elevate.  Inpatient neurology service will continue to follow      Medical  Decision Making    Number/Complexity of Problems  Moderate  1 undiagnosed new problem with uncertain prognosis -   1 acute illness with systemic symptoms -   High  1 acute or chronic illness that poses a threat to life/body function -   High    MDM Data  Moderate - 1/3 categories  Extensive - 2/3 categories    Category 1: 3 of the following  Review of external notes  Review of results  Ordering of each unique test  Independent historian  Category 2:  Independent interpretation of test (ex: imaging)  Category 3:  Discussion of management with another provider    Extensive     Treatment Plan  Moderate - Prescription Drug management  High  Drug therapy requiring intensive monitoring for toxicity  Decision regarding hospitalization or escalation of care  De-escalate care/DNR decisions  Drug therapy requiring intensive monitoring (high)      Kishore Key MD  07/17/24  15:40 CDT

## 2024-07-17 NOTE — PAYOR COMM NOTE
"Joey Argueta (58 y.o. Male) [id number]   017/17  Clinical update   [facility] [phone]  FAX  [phone]      Date of Birth   1965    Social Security Number       Address   [address] [place] [address] [address] [place] [address]    Home Phone   [phone]    MRN   [id number]       Yazidi   Quaker    Marital Status                               Admission Date   7/13/24    Admission Type   Emergency    Admitting Provider   Leobardo Cerna MD    Attending Provider   Leobardo Cerna MD    Department, Room/Bed   [facility] 3A, 353/1       Discharge Date       Discharge Disposition       Discharge Destination                                 Attending Provider: Leobardo Cerna MD    Allergies: No Known Allergies    Isolation: None   Infection: None   Code Status: CPR    Ht: 203.2 cm (80\")   Wt: 122 kg (270 lb)    Admission Cmt: None   Principal Problem: Altered mental status [R41.82]                   Active Insurance as of 7/13/2024       Primary Coverage       Payor Plan Insurance Group Employer/Plan Group    [employer] PPO [id number]       Payor Plan Address Payor Plan Phone Number Payor Plan Fax Number Effective Dates    [address] [phone]  1/1/2019 - None Entered    [place] [id number]         Subscriber Name Subscriber Birth Date Member ID       JOEY ARGUETA 1965 [id number]                     Emergency Contacts        (Rel.) Home Phone Work Phone Mobile Phone    Jennie Argueta (Spouse) [phone] -- [phone]              Vital Signs (last day)       Date/Time Temp Temp src Pulse Resp BP Patient Position SpO2    07/17/24 0748 97.9 (36.6) Oral 86 19 150/98 Lying 93    07/17/24 0434 97.6 (36.4) Oral 80 16 148/91 Lying 91    07/17/24 0029 97.8 (36.6) Oral 90 18 154/87 Lying 93    07/16/24 1952 98.6 (37) Oral 97 18 158/98 Lying 94    07/16/24 1506 97.9 (36.6) Oral 96 20 149/95 Lying 93    07/16/24 1041 97.8 (36.6) " Oral 95 18 144/88 Lying 92    07/16/24 0800 97.9 (36.6) Oral 94 18 151/89 Lying 93    07/16/24 0334 97.7 (36.5) Oral 93 18 147/94 Lying 93    07/16/24 0155 98.1 (36.7) Oral 103 20 152/98 Lying 96          Current Facility-Administered Medications   Medication Dose Route Frequency Provider Last Rate Last Admin    acetaminophen (TYLENOL) tablet 650 mg  650 mg Oral Q6H PRN Andrew Corrigan MD        Or    acetaminophen (TYLENOL) suppository 650 mg  650 mg Rectal Q6H PRN Andrew Corrigan MD   650 mg at 07/14/24 1403    aspirin tablet 325 mg  325 mg Oral Daily Andrew Corrigan MD   325 mg at 07/16/24 0851    Or    aspirin suppository 300 mg  300 mg Rectal Daily Andrew Corrigan MD   300 mg at 07/13/24 1701    atorvastatin (LIPITOR) tablet 80 mg  80 mg Oral Nightly Andrew Corrigan MD   80 mg at 07/16/24 2008    benzonatate (TESSALON) capsule 200 mg  200 mg Oral TID PRN Leobardo Cerna MD   200 mg at 07/17/24 0450    cefTRIAXone (ROCEPHIN) 1,000 mg in sodium chloride 0.9 % 100 mL MBP  1,000 mg Intravenous Q24H Leobardo Cerna  mL/hr at 07/16/24 1014 1,000 mg at 07/16/24 1014    melatonin tablet 5 mg  5 mg Oral Nightly PRN Kishore Key MD   5 mg at 07/17/24 0023    methylPREDNISolone sodium succinate (SOLU-Medrol) 1,000 mg in sodium chloride 0.9 % 100 mL IVPB  1,000 mg Intravenous Daily Kishore Key  mL/hr at 07/16/24 0852 1,000 mg at 07/16/24 0852    Followed by    [START ON 7/20/2024] predniSONE (DELTASONE) tablet 60 mg  60 mg Oral Daily With Breakfast Kishore Key MD        Followed by    [START ON 8/3/2024] predniSONE (DELTASONE) tablet 50 mg  50 mg Oral Daily With Breakfast Kishoer Key MD        Followed by    [START ON 8/17/2024] predniSONE (DELTASONE) tablet 40 mg  40 mg Oral Daily With Breakfast Kishore Key MD        Followed by    [START ON 8/31/2024] predniSONE (DELTASONE) tablet 30 mg  30 mg Oral Daily With Breakfast Kishore Key MD         Followed by    [START ON 9/14/2024] predniSONE (DELTASONE) tablet 20 mg  20 mg Oral Daily With Breakfast Kishore Key MD        ondansetron (ZOFRAN) injection 4 mg  4 mg Intravenous Q6H PRN Leobardo Cerna MD        sodium chloride 0.9 % flush 10 mL  10 mL Intravenous Q12H Andrew Corrigan MD   10 mL at 07/16/24 2008    sodium chloride 0.9 % flush 10 mL  10 mL Intravenous PRN Andrew Corrigan MD        sodium chloride 0.9 % infusion 40 mL  40 mL Intravenous PRN Andrew Corrigan MD        sodium chloride 0.9 % infusion  100 mL/hr Intravenous Continuous Andrew Corrigan  mL/hr at 07/15/24 2231 100 mL/hr at 07/15/24 2231    ziprasidone (GEODON) injection 10 mg  10 mg Intramuscular Q6H PRN Andrew Corrigan MD   10 mg at 07/15/24 1226        Physician Progress Notes (last 24 hours)        Leobardo Cerna MD at 07/16/24 1431              [facility] Medicine Services  INPATIENT PROGRESS NOTE    Patient Name: Ty Webb  Date of Admission: 7/13/2024  Today's Date: 07/16/24  Length of Stay: 3  Primary Care Physician: Irineo Cunningham APRN    Subjective   Chief Complaint: Altered mental status.   HPI   Patient is oriented x 3.  Altered mental status improving.  Tmax 98.9.  T-current 97.8.  Blood pressure stable, afebrile.  White blood cells normal.  Hemoglobin is normal.  CT scan abdomen discussed with family.  Patient denies any abdomen pain.  Family wants something for cough.  Patient is walking the hallway.    Review of Systems   Constitutional:  Positive for activity change and appetite change. Negative for chills and fever.   HENT:  Negative for hearing loss, nosebleeds, tinnitus and trouble swallowing.    Eyes:  Negative for visual disturbance.   Respiratory:  Negative for cough, chest tightness, shortness of breath and wheezing.    Cardiovascular:  Negative for chest pain, palpitations and leg swelling.   Gastrointestinal:  Negative for abdominal distention,  abdominal pain, blood in stool, constipation, diarrhea, nausea and vomiting.   Endocrine: Negative for cold intolerance, heat intolerance, polydipsia, polyphagia and polyuria.   Genitourinary:  Negative for decreased urine volume, difficulty urinating, dysuria, flank pain, frequency and hematuria.   Musculoskeletal:  Positive for arthralgias, gait problem and myalgias. Negative for joint swelling.   Skin:  Negative for rash.   Allergic/Immunologic: Negative for immunocompromised state.   Neurological:  Negative for dizziness, syncope, weakness, light-headedness and headaches.   Hematological:  Negative for adenopathy. Does not bruise/bleed easily.   Psychiatric/Behavioral:  Positive for confusion. Negative for sleep disturbance. The patient is not nervous/anxious.    All pertinent negatives and positives are as above. All other systems have been reviewed and are negative unless otherwise stated.     Objective    Temp:  [97.7 °F (36.5 °C)-98.9 °F (37.2 °C)] 97.8 °F (36.6 °C)  Heart Rate:  [] 95  Resp:  [18-20] 18  BP: (144-157)/(88-98) 144/88  Physical Exam  Vitals and nursing note reviewed.   HENT:      Head: Normocephalic.   Eyes:      Conjunctiva/sclera: Conjunctivae normal.      Pupils: Pupils are equal, round, and reactive to light.   Cardiovascular:      Rate and Rhythm: Normal rate and regular rhythm.      Heart sounds: Normal heart sounds.   Pulmonary:      Effort: Pulmonary effort is normal. No respiratory distress.      Breath sounds: Normal breath sounds.   Abdominal:      General: Bowel sounds are normal. There is no distension.      Palpations: Abdomen is soft.      Tenderness: There is no abdominal tenderness.   Musculoskeletal:         General: No swelling.      Cervical back: Neck supple.   Skin:     General: Skin is warm and dry.      Capillary Refill: Capillary refill takes 2 to 3 seconds.      Findings: No rash.   Neurological:      Mental Status: He is alert.      Motor: Weakness present.       Coordination: Coordination abnormal.      Gait: Gait abnormal.   Psychiatric:         Mood and Affect: Mood normal.         Behavior: Behavior normal.       Results Review:  I have reviewed the labs, radiology results, and diagnostic studies.    Laboratory Data:   Results from last 7 days   Lab Units 07/16/24  0436 07/15/24  0418 07/13/24  1022   WBC 10*3/mm3 8.44 7.43 7.62   HEMOGLOBIN g/dL 15.5 16.0 15.7   HEMATOCRIT % 44.9 47.3 45.5   PLATELETS 10*3/mm3 206 160 160        Results from last 7 days   Lab Units 07/16/24  0436 07/15/24  0418 07/13/24  1022   SODIUM mmol/L 138 137 135*   POTASSIUM mmol/L 4.2 4.2 4.2   CHLORIDE mmol/L 102 100 98   CO2 mmol/L 23.0 26.0 26.0   BUN mg/dL 24* 18 14   CREATININE mg/dL 1.08 0.97 1.04   CALCIUM mg/dL 8.8 8.6 8.9   BILIRUBIN mg/dL 0.5  --  0.6   ALK PHOS U/L 60  --  66   ALT (SGPT) U/L 49*  --  31   AST (SGOT) U/L 25  --  22   GLUCOSE mg/dL 181* 110* 156*       Culture Data:   Blood Culture   Date Value Ref Range Status   07/14/2024 No growth at 2 days  Preliminary   07/14/2024 No growth at 2 days  Preliminary       Radiology Data:   Imaging Results (Last 24 Hours)       Procedure Component Value Units Date/Time    CT Abdomen With Contrast [[id number]] Collected: 07/15/24 1723     Updated: 07/15/24 1731    Narrative:      EXAM/TECHNIQUE: CT abdomen with IV contrast     INDICATION: Abdominal pain, altered mental status     COMPARISON: None available.     DLP: 1177.83 mGy.cm. Automated exposure control was also utilized to  decrease patient radiation dose.     FINDINGS:     Bibasilar atelectasis.     No suspicious liver lesion. Cholelithiasis without evidence of  cholecystitis. No biliary ductal dilatation.     Pancreas and adrenal glands are unremarkable. Spleen is upper limits of  normal in size.     10 mm, 3 mm, and 4 mm left upper pole renal calculi. No hydronephrosis.  No solid renal mass.     Distal esophagus and stomach are unremarkable. Small duodenal  diverticulum. No  small bowel distention or evidence of active small  bowel inflammation in the abdomen. Visualized portion of the colon is  unremarkable without evidence of wall thickening or pericolonic fat  stranding.     No ascites. Nonaneurysmal atherosclerotic abdominal aorta. Patent SMA.  No enlarged retroperitoneal or mesenteric abdominal lymph nodes.     No acute abdominal wall soft tissue abnormality. No acute osseous  finding.       Impression:         1.  No acute findings in the abdomen.     2.  Multiple nonobstructing left renal calculi measuring up to 10 mm. No  hydronephrosis.     3.  Cholelithiasis without evidence of cholecystitis.     4.  Bibasilar atelectasis.     This report was signed and finalized on 7/15/2024 5:27 PM by Dr. Marc Brown MD.               I have reviewed the patient's current medications.     Assessment/Plan   Assessment  Active Hospital Problems    Diagnosis     **Altered mental status     Essential hypertension     Gastroesophageal reflux disease without esophagitis     Mixed hyperlipidemia        Treatment Plan  Altered mental status.  Oriented x 1..  Acute encephalopathy.  Possible autoimmune encephalitis.  IV hydration.  Neurology consult.  . Geodon as needed.  Status post lumbar puncture 7/13/2024.  No evidence infection at this time.  High-dose steroids started by neurology.  MRI of the brain-Extensive paranasal sinus mucosal thickening, No acute intracranial abnormality identified.  UDS-negative.  EEG-Diffuse cerebral dysfunction of mild degree but nonspecific-  is most commonly seen due to toxic/metabolic cause, No evidence for epilepsy is seen on the study.     Sinusitis/cough.  Rocephin.  Tessalon Perles as needed.  Chest x-ray-No active disease in the chest.   Patient is on room air.     Hyperlipidemia/hypertension.  Echocardiogram-ejection fraction 56 to 60%, saline test negative, no obvious valvular abnormality identified in this study.     Reflux.  Zofran as  needed.    Abdomen pain/multiple nonobstructing renal calculi measuring up to 10 mm/cholelithiasis.  Nontender abdomen.  CT scan of abdomen-  No acute findings in the abdomen,  Multiple nonobstructing left renal calculi measuring up to 10 mm- No  Hydronephrosis, Cholelithiasis without evidence of cholecystitis,  Bibasilar atelectasis.     Hemoglobin A1c 6.5.  Diabetic educator.     Nutrition . N.p.o. for now due to dysphagia.     Deconditioning.  PT and OT consult.     Blood cultures-no growth for 2 days.  West Nile is pending.  Varicella negative.  Meningitis panel-negative.  COVID-19-negative.  Flu screen-negative.     Medical Decision Making  Number and Complexity of problems: Altered mental status/sinus/hypertension/hyperlipidemia  Differential Diagnosis: None     Conditions and Status        Condition is unchanged.     [facility] Data  External documents reviewed: Previous note  Cardiac tracing (EKG, telemetry) interpretation: Sinus  Radiology interpretation: MRI/EEG/chest x-ray  Labs reviewed: Laboratory  Any tests that were considered but not ordered: Lab in a.m.     Decision rules/scores evaluated (example [id number], Wells, etc): None     Discussed with: Patient      Care Planning  Shared decision making: Patient  Code status and discussions: Full code     Disposition  Social Determinants of Health that impact treatment or disposition: From home  1 to 3 days    Electronically signed by Leobardo Cerna MD, 07/16/24, 14:31 CDT.    Electronically signed by Leobardo Cerna MD at 07/16/24 1452       Kishore Key MD at 07/16/24 1008            Neurology Progress Note      Chief Complaint:  Encephalopathy  Length of Stay:  3     Interval     7/16: Significant improvement in neurological exam.  Received D2 of IV Methylpred 1 g.  Blood cultures from 7/14 no growth to date.  Specialized labs pending.    7/15: Improvement in his mental status as fever improved.  D1 of ceftriaxone for severe sinusitis.  D1 of IV  methylprednisolone 1 g daily for 5 days.  Blood cultures from 7/14 no growth to date.  Specialized labs pending.    7/13-14: Lumbar puncture performed, initial labs remarkable for mild pleocytosis and mild elevation of protein.  Meningitis/encephalitis panel PCR negative, West Nile PCR negative, VZV PCR negative, blood cultures drawn.  B12 and folate normal.  TSH normal.  UDS negative.  Specialized lab sent: MS propofol and MBP, NMDA and CSF, encephalopathy panel, ACE, paraneoplastic panel, thiamine, NMO IgG.      Subjective     Subjective:  Seen at bedside with wife and sister, who have reported significant overnight improvement in his mental status not being able to be awake and speaking in few sentences, still very altered from his baseline.  No longer with fever    Medications:  Current Facility-Administered Medications   Medication Dose Route Frequency Provider Last Rate Last Admin    acetaminophen (TYLENOL) tablet 650 mg  650 mg Oral Q6H PRN Andrew Corrigan MD        Or    acetaminophen (TYLENOL) suppository 650 mg  650 mg Rectal Q6H PRN Andrew Corrigan MD   650 mg at 07/14/24 1403    aspirin tablet 325 mg  325 mg Oral Daily Andrew Corrigan MD   325 mg at 07/16/24 0851    Or    aspirin suppository 300 mg  300 mg Rectal Daily Andrew Corrigan MD   300 mg at 07/13/24 1701    atorvastatin (LIPITOR) tablet 80 mg  80 mg Oral Nightly Andrew Corrigan MD   80 mg at 07/15/24 2019    cefTRIAXone (ROCEPHIN) 1,000 mg in sodium chloride 0.9 % 100 mL MBP  1,000 mg Intravenous Q24H Leobardo Cerna  mL/hr at 07/15/24 0939 1,000 mg at 07/15/24 0939    melatonin tablet 5 mg  5 mg Oral Nightly PRN Kishore Key MD        methylPREDNISolone sodium succinate (SOLU-Medrol) 1,000 mg in sodium chloride 0.9 % 100 mL IVPB  1,000 mg Intravenous Daily Kishore Key  mL/hr at 07/16/24 0852 1,000 mg at 07/16/24 0852    Followed by    [START ON 7/20/2024] predniSONE (DELTASONE) tablet 60 mg  60 mg Oral  Daily With Breakfast Kishore Key MD        Followed by    [START ON 8/3/2024] predniSONE (DELTASONE) tablet 50 mg  50 mg Oral Daily With Breakfast Kishore Key MD        Followed by    [START ON 8/17/2024] predniSONE (DELTASONE) tablet 40 mg  40 mg Oral Daily With Breakfast Kishore Key MD        Followed by    [START ON 8/31/2024] predniSONE (DELTASONE) tablet 30 mg  30 mg Oral Daily With Breakfast Kishore Key MD        Followed by    [START ON 9/14/2024] predniSONE (DELTASONE) tablet 20 mg  20 mg Oral Daily With Breakfast Kishore Key MD        ondansetron (ZOFRAN) injection 4 mg  4 mg Intravenous Q6H PRN Leobardo Cerna MD        sodium chloride 0.9 % flush 10 mL  10 mL Intravenous Q12H Andrew Corrigan MD   10 mL at 07/15/24 2018    sodium chloride 0.9 % flush 10 mL  10 mL Intravenous PRN Andrew Corrigan MD        sodium chloride 0.9 % infusion 40 mL  40 mL Intravenous PRN Andrew Corrigan MD        sodium chloride 0.9 % infusion  100 mL/hr Intravenous Continuous Andrew Corrigan  mL/hr at 07/15/24 2231 100 mL/hr at 07/15/24 2231    ziprasidone (GEODON) injection 10 mg  10 mg Intramuscular Q6H PRN Andrew Corrigan MD   10 mg at 07/15/24 1226             Objective      Vital Signs  Temp:  [97.7 °F (36.5 °C)-98.9 °F (37.2 °C)] 97.9 °F (36.6 °C)  Heart Rate:  [] 94  Resp:  [18-20] 18  BP: (147-157)/(89-98) 151/89    Physical Exam:    No evidence of neck stiffness  No evidence of photophobia.  The lights are on bright room and the patient does not show any concern in regards to this.  No signs that the patient is in pain nor has a headache    Pertinent Neuro Exam:  Awake.  With reduced verbal output however able to form sentences that are complex.  Follows simple and complex commands, is able to repeat complex sentences, no word finding difficulty even with low-frequency words.  Able to estimate 7 quarters in $1.75, able to say the days of the week  backwards without hesitation, some difficulty naming the months of the year backwards with skipping 2 months.   Cranial nerves II to XII intact  Moving all extremities with great strength  No pathologic reflexes  No signs of gross ataxia      Last nurse assessment:  Interval:  (handoff)  1a. Level of Consciousness: 0-->Alert, keenly responsive  1b. LOC Questions: 0-->Answers both questions correctly  1c. LOC Commands: 0-->Performs both tasks correctly  2. Best Gaze: 0-->Normal  3. Visual: 0-->No visual loss  4. Facial Palsy: 0-->Normal symmetrical movements  5a. Motor Arm, Left: 0-->No drift, limb holds 90 (or 45) degrees for full 10 secs  5b. Motor Arm, Right: 0-->No drift, limb holds 90 (or 45) degrees for full 10 secs  6a. Motor Leg, Left: 0-->No drift, leg holds 30 degree position for full 5 secs  6b. Motor Leg, Right: 0-->No drift, leg holds 30 degree position for full 5 secs  7. Limb Ataxia: 1-->Present in one limb  8. Sensory: 0-->Normal, no sensory loss  9. Best Language: 1-->Mild-to-moderate aphasia, some obvious loss of fluency or facility of comprehension, without significant limitation on ideas expressed or form of expression. Reduction of speech and/or comprehension, however, makes conversation. . . (see row details)  10. Dysarthria: 0-->Normal  11. Extinction and Inattention (formerly Neglect): 0-->No abnormality    Total (NIH Stroke Scale): 2       Results Review:      Labs:  Lumbar puncture results:  White cells 22  Red cells 67  Protein 57.3  Glucose 79  Meningitis/encephalitis panel normal  Lyme Western blot pending  West Nile virus pending  Paraneoplastic panel pending  ACE level pending  Autoimmune encephalitis panel pending  NMDA receptor pending  MS panel pending  Ammonia level normal  Folate level normal  TSH level normal  B12 level normal  B1 level pending    Imaging:  Head CT without contrast reviewed by me showing no acute findings  MRI brain with and without contrast 7/14: With extensive  paranasal sinus mucosal thickening, however no acute intracranial abnormality and no contrast-enhancement.  EEG 7/15: With diffuse cerebral dysfunction, but no epileptiform discharges.    Assessment/Plan     Hospital Problem List      Altered mental status    Essential hypertension    Gastroesophageal reflux disease without esophagitis    Mixed hyperlipidemia    Impression:  Altered mentation  Current testing has ruled out an infectious etiology.  No signs of a bacterial nor viral meningitis/encephalitis  Current working diagnoses is an autoimmune encephalitis      Plan:  IV methylprednisolone 1 g daily for 5 days followed by prednisone 60 mg p.o. for 2 weeks follow-up with slow taper of reducing 10 mg every 2 weeks, until reaching dose of 20 mg daily and then further steroid depending on neurology appointment.    Continue following up on lumbar puncture results  Appreciate internal medicine being the primary attending.  Please continue to trend blood glucose levels if we initiate high-dose steroids as these may elevate.  Inpatient neurology service will continue to follow      Medical Decision Making    Number/Complexity of Problems  Moderate  1 undiagnosed new problem with uncertain prognosis -   1 acute illness with systemic symptoms -   High  1 acute or chronic illness that poses a threat to life/body function -   High    MDM Data  Moderate - 1/3 categories  Extensive - 2/3 categories    Category 1: 3 of the following  Review of external notes  Review of results  Ordering of each unique test  Independent historian  Category 2:  Independent interpretation of test (ex: imaging)  Category 3:  Discussion of management with another provider    Extensive     Treatment Plan  Moderate - Prescription Drug management  High  Drug therapy requiring intensive monitoring for toxicity  Decision regarding hospitalization or escalation of care  De-escalate care/DNR decisions  Drug therapy requiring intensive monitoring  (high)      Kishore Key MD  07/16/24  10:12 CDT      Electronically signed by Kishore Key MD at 07/16/24 0112

## 2024-07-17 NOTE — PROGRESS NOTES
[facility] Medicine Services  INPATIENT PROGRESS NOTE    Patient Name: Ty Webb  Date of Admission: 7/13/2024  Today's Date: 07/17/24  Length of Stay: 4  Primary Care Physician: Irineo Cunningham APRN    Subjective   Chief Complaint: Altered mental status.  HPI   Documents resolved patient is back to baseline.  Blood pressure slightly high but stable, afebrile.  Patient back on low-dose of Coreg and lisinopril.    Review of Systems   Constitutional:  Positive for activity change and appetite change. Negative for chills and fever.   HENT:  Negative for hearing loss, nosebleeds, tinnitus and trouble swallowing.    Eyes:  Negative for visual disturbance.   Respiratory:  Negative for cough, chest tightness, shortness of breath and wheezing.    Cardiovascular:  Negative for chest pain, palpitations and leg swelling.   Gastrointestinal:  Negative for abdominal distention, abdominal pain, blood in stool, constipation, diarrhea, nausea and vomiting.   Endocrine: Negative for cold intolerance, heat intolerance, polydipsia, polyphagia and polyuria.   Genitourinary:  Negative for decreased urine volume, difficulty urinating, dysuria, flank pain, frequency and hematuria.   Musculoskeletal: Gait and dysmotility resolved.    Negative for joint swelling.   Skin:  Negative for rash.   Allergic/Immunologic: Negative for immunocompromised state.   Neurological:  Negative for dizziness, syncope, weakness, light-headedness and headaches.   Hematological:  Negative for adenopathy. Does not bruise/bleed easily.   Psychiatric/Behavioral:  Positive for confusion. Negative for sleep disturbance. The patient is not nervous/anxious.    All pertinent negatives and positives are as above. All other systems have been reviewed and are negative unless otherwise stated.     Objective    Temp:  [97.6 °F (36.4 °C)-98.6 °F (37 °C)] 97.9 °F (36.6 °C)  Heart Rate:  [80-97] 86  Resp:  [16-20] 19  BP:  (144-158)/(87-98) 150/98  Physical Exam  Vitals and nursing note reviewed.   HENT:      Head: Normocephalic.   Eyes:      Conjunctiva/sclera: Conjunctivae normal.      Pupils: Pupils are equal, round, and reactive to light.   Cardiovascular:      Rate and Rhythm: Normal rate and regular rhythm.      Heart sounds: Normal heart sounds.   Pulmonary:      Effort: Pulmonary effort is normal. No respiratory distress.      Breath sounds: Normal breath sounds.   Abdominal:      General: Bowel sounds are normal. There is no distension.      Palpations: Abdomen is soft.      Tenderness: There is no abdominal tenderness.   Musculoskeletal:         General: No swelling.      Cervical back: Neck supple.   Skin:     General: Skin is warm and dry.      Capillary Refill: Capillary refill takes 2 to 3 seconds.      Findings: No rash.   Neurological:      Mental Status: He is alert.      Motor: Weakness improving.  Present.      Coordination: Coordination resolved.     Gait: Gait resolved.  Psychiatric:         Mood and Affect: Mood normal.         Behavior: Behavior normal.       Results Review:  I have reviewed the labs, radiology results, and diagnostic studies.    Laboratory Data:   Results from last 7 days   Lab Units 07/17/24  0431 07/16/24  0436 07/15/24  0418   WBC 10*3/mm3 14.40* 8.44 7.43   HEMOGLOBIN g/dL 13.6 15.5 16.0   HEMATOCRIT % 40.7 44.9 47.3   PLATELETS 10*3/mm3 209 206 160        Results from last 7 days   Lab Units 07/17/24  0431 07/16/24  0436 07/15/24  0418 07/13/24  1022   SODIUM mmol/L 138 138 137 135*   POTASSIUM mmol/L 4.6 4.2 4.2 4.2   CHLORIDE mmol/L 104 102 100 98   CO2 mmol/L 24.0 23.0 26.0 26.0   BUN mg/dL 25* 24* 18 14   CREATININE mg/dL 1.03 1.08 0.97 1.04   CALCIUM mg/dL 8.7 8.8 8.6 8.9   BILIRUBIN mg/dL  --  0.5  --  0.6   ALK PHOS U/L  --  60  --  66   ALT (SGPT) U/L  --  49*  --  31   AST (SGOT) U/L  --  25  --  22   GLUCOSE mg/dL 159* 181* 110* 156*       Culture Data:   Blood Culture   Date  Value Ref Range Status   07/14/2024 No growth at 2 days  Preliminary   07/14/2024 No growth at 2 days  Preliminary       Radiology Data:   Imaging Results (Last 24 Hours)       ** No results found for the last 24 hours. **            I have reviewed the patient's current medications.     Assessment/Plan   Assessment  Active Hospital Problems    Diagnosis     **Altered mental status     Essential hypertension     Gastroesophageal reflux disease without esophagitis     Mixed hyperlipidemia        Treatment Plan  Altered mental status.  Oriented x 1..  Acute encephalopathy.  Possible autoimmune encephalitis.  IV hydration.  Neurology consult.  . Geodon as needed.  Status post lumbar puncture 7/13/2024.  No evidence infection at this time.  High-dose steroids started by neurology.  MRI of the brain-Extensive paranasal sinus mucosal thickening, No acute intracranial abnormality identified.  UDS-negative.  EEG-Diffuse cerebral dysfunction of mild degree but nonspecific-  is most commonly seen due to toxic/metabolic cause, No evidence for epilepsy is seen on the study.     Sinusitis/cough.  Rocephin.  Tessalon Perles as needed.  Leukocytosis, probably secondary to steroid.  Chest x-ray-No active disease in the chest.   Patient is on room air.     Hyperlipidemia/hypertension.  Put patient back low-dose of Coreg and lisinopril for now.  Continue Lipitor.  Echocardiogram-ejection fraction 56 to 60%, saline test negative, no obvious valvular abnormality identified in this study.  Family request to DC telemetry and IV fluids and gets around better.     Reflux.  Zofran as needed.     Abdomen pain/multiple nonobstructing renal calculi measuring up to 10 mm/cholelithiasis.  Nontender abdomen.  CT scan of abdomen-  No acute findings in the abdomen,  Multiple nonobstructing left renal calculi measuring up to 10 mm- No  Hydronephrosis, Cholelithiasis without evidence of cholecystitis,  Bibasilar atelectasis.     Hemoglobin A1c 6.5.   Diabetic educator.     Nutrition .  Regular/house diet.     Deconditioning.  PT and OT consult.     Blood cultures-no growth for 2 days.  West Nile is pending.  Varicella negative.  Meningitis panel-negative.  COVID-19-negative.  Flu screen-negative.     Medical Decision Making  Number and Complexity of problems: Altered mental status/sinus/hypertension/hyperlipidemia  Differential Diagnosis: None     Conditions and Status        Condition is unchanged.     MDM Data  External documents reviewed: Previous note  Cardiac tracing (EKG, telemetry) interpretation: Sinus  Radiology interpretation: MRI/EEG/chest x-ray  Labs reviewed: Laboratory  Any tests that were considered but not ordered: Lab in a.m.     Decision rules/scores evaluated (example [id number], [place], etc): None     Discussed with: Patient      Care Planning  Shared decision making: Patient  Code status and discussions: Full code     Disposition  Social Determinants of Health that impact treatment or disposition: From home  Pending neurology    Electronically signed by Leobardo Cerna MD, 07/17/24, 10:20 CDT.

## 2024-07-17 NOTE — THERAPY DISCHARGE NOTE
Acute Care - Speech Language Pathology   Swallow Treatment Note/Discharge  [place]     Patient Name: Ty Webb  : 1965  MRN: [id number]  Today's Date: 2024               Admit Date: 2024  Swallow follow up completed. The patient was alert and cooperative. Wife present. Patient reports great appetite and no concerns with toleration of current diet. He completed trials with no difficulty.     Extensive discussion regarding cognitive status. The patient and his wife feel he is back to baseline. He does have a demanding job at [employer]. His wife is a bit worried about his ability to go back quickly. We discussed screening for cognitive concerns once in the home environment. I encouraged follow up with neurology and discussion with MD for OP referral for speech evaluation for cognition if any concerns persist at home. They both verbalized understanding.     SLP signing on at this time.   Evy Montalvo MS CCC-SLP 2024 08:08 CDT    Visit Dx:    ICD-10-CM ICD-9-CM   1. Altered mental status, unspecified altered mental status type  R41.82 780.97   2. Dysphagia, unspecified type  R13.10 787.20   3. Impaired mobility [Z74.09]  Z74.09 799.89     Patient Active Problem List   Diagnosis    Essential hypertension    Gastroesophageal reflux disease without esophagitis    Mixed hyperlipidemia    Cough    Influenza B    Perineal pain    Altered mental status     Past Medical History:   Diagnosis Date    GERD (gastroesophageal reflux disease)     Hyperlipidemia     Hypertension      Past Surgical History:   Procedure Laterality Date    HERNIA REPAIR         SLP Recommendation and Plan                    Anticipated Discharge Disposition (SLP): unknown (24)              Daily Summary of Progress (SLP): progress toward functional goals as expected (24 0135)     Anticipated Discharge Disposition (SLP): unknown (24 08)           Reason for Discharge: all goals and outcomes met, no  further needs identified (07/17/24 0807)     Treatment Assessment (SLP): improved (07/17/24 0135)  Treatment Assessment Comments (SLP): See note (07/17/24 0135)  Plan for Continued Treatment (SLP): treatment no longer indicated as all goals met (07/17/24 0135)    Plan of Care Reviewed With: patient, spouse (07/17/24 0800)  Progress: improving (07/17/24 0800)    SWALLOW EVALUATION (Last 72 Hours)       SLP Adult Swallow Evaluation       Row Name 07/17/24 0135 07/16/24 0859 07/15/24 1218 07/15/24 0740 07/14/24 1125       Rehab Evaluation    Document Type therapy note (daily note)  -MG therapy note (daily note)  -MG therapy note (daily note)  -MG therapy note (daily note)  -MG therapy note (daily note)  -CS    Subjective Information no complaints  -MG no complaints  -MG no complaints  -MG -- no complaints  -CS    Patient Observations alert;cooperative;agree to therapy  -MG alert;cooperative;agree to therapy  -MG alert;cooperative;agree to therapy  -MG -- alert;poorly cooperative  -CS    Patient/Family/Caregiver Comments/Observations Wife present.  -MG Wife present.  -MG Wife present.  -MG -- Wife present  -CS    Session Not Performed -- -- -- unable to treat, medical status change  -MG --    Patient Effort good  -MG good  -MG good  -MG -- poor  -CS    Comment -- -- -- EEG  -MG --    Symptoms Noted During/After Treatment none  -MG none  -MG none  -MG -- --       Pain    Additional Documentation Pain Scale: FACES Pre/Post-Treatment (Group)  -MG Pain Scale: FACES Pre/Post-Treatment (Group)  -MG Pain Scale: FACES Pre/Post-Treatment (Group)  -MG -- Pain Scale: FACES Pre/Post-Treatment (Group)  -CS       Pain Scale: FACES Pre/Post-Treatment    Pain: FACES Scale, Pretreatment 0-->no hurt  -MG 0-->no hurt  -MG 0-->no hurt  -MG -- 0-->no hurt  -CS    Posttreatment Pain Rating 0-->no hurt  -MG 0-->no hurt  -MG 0-->no hurt  -MG -- 0-->no hurt  -CS       SLP Treatment Clinical Impressions    Treatment Assessment (SLP) improved   -MG improved  -MG improved  -MG -- continued  -CS    Treatment Assessment Comments (SLP) See note  -MG See note.  -MG See note  -MG -- --    Daily Summary of Progress (SLP) progress toward functional goals as expected  -MG progress toward functional goals as expected  -MG progress toward functional goals is gradual  -MG -- progress toward functional goals is gradual  -CS    Barriers to Overall Progress (SLP) -- -- Cognitive status  -MG -- Cognitive status  -CS    Plan for Continued Treatment (SLP) treatment no longer indicated as all goals met  -MG continue treatment per plan of care  -MG goals adjusted to reflect functional improvements demonstrated  -MG -- continue treatment per plan of care  -CS    Care Plan Review care plan/treatment goals reviewed  -MG care plan/treatment goals reviewed  -MG care plan/treatment goals reviewed;risks/benefits reviewed;current/potential barriers reviewed;patient/other agree to care plan  -MG -- care plan/treatment goals reviewed  -CS    Care Plan Review, Other Participant(s) spouse  -MG spouse  -MG caregiver  JODY Downing  -MG -- spouse  -CS       Recommendations    SLP Diet Recommendation -- -- regular textures;thin liquids  -MG -- --    SLP Rec. for Method of Medication Administration -- -- meds whole;as tolerated  -MG -- --       Swallow Goals (SLP)    Swallow LTGs Swallow Long Term Goal (free text)  -MG Swallow Long Term Goal (free text)  -MG Swallow Long Term Goal (free text)  -MG -- Swallow Long Term Goal (free text)  -CS    Swallow STGs diet tolerance goal selection (SLP)  -MG diet tolerance goal selection (SLP)  -MG diet tolerance goal selection (SLP)  -MG -- diet tolerance goal selection (SLP)  -CS    Diet Tolerance Goal Selection (SLP) Patient will tolerate trials of  -MG Patient will tolerate trials of  -MG Patient will tolerate trials of  -MG -- Patient will tolerate trials of  -CS       (LTG) Swallow    (LTG) Swallow Pt will tolerate LRD w/o any overt s/s of aspiration.   -MG Pt will tolerate LRD w/o any overt s/s of aspiration.  -MG Pt will tolerate LRD w/o any overt s/s of aspiration.  -MG -- Pt will tolerate LRD w/o any overt s/s of aspiration.  -CS    [place] (Swallow Long Term Goal) with minimal cues (75-90% accuracy)  -MG with minimal cues (75-90% accuracy)  -MG with minimal cues (75-90% accuracy)  -MG -- with minimal cues (75-90% accuracy)  -CS    Time Frame (Swallow Long Term Goal) by discharge  -MG by discharge  -MG by discharge  -MG -- by discharge  -CS    Barriers (Swallow Long Term Goal) cog status  -MG cog status  -MG cog status  -MG -- cog status  -CS    Progress/Outcomes (Swallow Long Term Goal) goal met  -MG continuing progress toward goal  -MG continuing progress toward goal  -MG -- goal ongoing  -CS       (STG) Patient will tolerate trials of    Consistencies Trialed (Tolerate trials) regular textures;pureed textures;thin liquids;nectar/ mildly thick liquids;honey/ moderately thick liquids  -MG regular textures;pureed textures;thin liquids;nectar/ mildly thick liquids;honey/ moderately thick liquids  -MG regular textures;pureed textures;thin liquids;nectar/ mildly thick liquids;honey/ moderately thick liquids  -MG -- regular textures;pureed textures;thin liquids;nectar/ mildly thick liquids;honey/ moderately thick liquids  -CS    Desired Outcome (Tolerate trials) without signs/symptoms of aspiration;without signs of distress  -MG without signs/symptoms of aspiration;without signs of distress  -MG without signs/symptoms of aspiration;without signs of distress  -MG -- without signs/symptoms of aspiration;without signs of distress  -CS    [place] (Tolerate trials) with minimal cues (75-90% accuracy)  -MG with minimal cues (75-90% accuracy)  -MG with minimal cues (75-90% accuracy)  -MG -- with minimal cues (75-90% accuracy)  -CS    Time Frame (Tolerate trials) by discharge  -MG by discharge  -MG by discharge  -MG -- by discharge  -CS    Progress/Outcomes  (Tolerate trials) goal met  -MG continuing progress toward goal  -MG continuing progress toward goal  -MG -- goal ongoing  -CS              User Key  (r) = Recorded By, (t) = Taken By, (c) = Cosigned By      Initials Name Effective Dates    CS Pee Michaud, [facility]-SLP 05/07/24 -     MG Evy Montalvo, MS [facility]-SLP 07/11/23 -                     EDUCATION  The patient has been educated in the following areas:   Dysphagia (Swallowing Impairment) Oral Care/Hydration.         SLP GOALS       Row Name 07/17/24 0135 07/16/24 0859 07/15/24 1218       (LTG) Swallow    (LTG) Swallow Pt will tolerate LRD w/o any overt s/s of aspiration.  -MG Pt will tolerate LRD w/o any overt s/s of aspiration.  -MG Pt will tolerate LRD w/o any overt s/s of aspiration.  -MG    [place] (Swallow Long Term Goal) with minimal cues (75-90% accuracy)  -MG with minimal cues (75-90% accuracy)  -MG with minimal cues (75-90% accuracy)  -MG    Time Frame (Swallow Long Term Goal) by discharge  -MG by discharge  -MG by discharge  -MG    Barriers (Swallow Long Term Goal) cog status  -MG cog status  -MG cog status  -MG    Progress/Outcomes (Swallow Long Term Goal) goal met  -MG continuing progress toward goal  -MG continuing progress toward goal  -MG       (STG) Patient will tolerate trials of    Consistencies Trialed (Tolerate trials) regular textures;pureed textures;thin liquids;nectar/ mildly thick liquids;honey/ moderately thick liquids  -MG regular textures;pureed textures;thin liquids;nectar/ mildly thick liquids;honey/ moderately thick liquids  -MG regular textures;pureed textures;thin liquids;nectar/ mildly thick liquids;honey/ moderately thick liquids  -MG    Desired Outcome (Tolerate trials) without signs/symptoms of aspiration;without signs of distress  -MG without signs/symptoms of aspiration;without signs of distress  -MG without signs/symptoms of aspiration;without signs of distress  -MG    [place] (Tolerate trials) with minimal cues  (75-90% accuracy)  -MG with minimal cues (75-90% accuracy)  -MG with minimal cues (75-90% accuracy)  -MG    Time Frame (Tolerate trials) by discharge  -MG by discharge  -MG by discharge  -MG    Progress/Outcomes (Tolerate trials) goal met  -MG continuing progress toward goal  -MG continuing progress toward goal  -MG      Row Name 07/14/24 1125             (LTG) Swallow    (LTG) Swallow Pt will tolerate LRD w/o any overt s/s of aspiration.  -CS      [place] (Swallow Long Term Goal) with minimal cues (75-90% accuracy)  -CS      Time Frame (Swallow Long Term Goal) by discharge  -CS      Barriers (Swallow Long Term Goal) cog status  -CS      Progress/Outcomes (Swallow Long Term Goal) goal ongoing  -CS         (STG) Patient will tolerate trials of    Consistencies Trialed (Tolerate trials) regular textures;pureed textures;thin liquids;nectar/ mildly thick liquids;honey/ moderately thick liquids  -CS      Desired Outcome (Tolerate trials) without signs/symptoms of aspiration;without signs of distress  -CS      [place] (Tolerate trials) with minimal cues (75-90% accuracy)  -CS      Time Frame (Tolerate trials) by discharge  -CS      Progress/Outcomes (Tolerate trials) goal ongoing  -CS                User Key  (r) = Recorded By, (t) = Taken By, (c) = Cosigned By      Initials Name Provider Type    CS Pee Michaud, [facility]-SLP Speech and Language Pathologist    MG Evy Montalvo, MS CCC-SLP Speech and Language Pathologist                           Time Calculation:    Time Calculation- SLP       Row Name 07/17/24 0807             Time Calculation- SLP    SLP Start Time 0735  -MG      SLP Stop Time 0807  -MG      SLP Time Calculation (min) 32 min  -MG      SLP Received On 07/17/24  -MG         Untimed Charges    14906-FQ Treatment Swallow Minutes 32  -MG         Total Minutes    Untimed Charges Total Minutes 32  -MG       Total Minutes 32  -MG                User Key  (r) = Recorded By, (t) = Taken By, (c) =  Cosigned By      Initials Name Provider Type    Evy Ron MS CCC-SLP Speech and Language Pathologist                    Therapy Charges for Today       Code Description Service Date Service Provider Modifiers Qty    72114169021 HC ST TREATMENT SWALLOW 2 7/16/2024 Evy Montalvo MS CCC-SLP GN 1    [id number] HC ST TREATMENT SWALLOW 2 7/17/2024 Evy Montalvo MS CCC-SLP GN 1                 SLP Discharge Summary  Anticipated Discharge Disposition (SLP): unknown  Reason for Discharge: all goals and outcomes met, no further needs identified  Progress Toward Achieving Short/long Term Goals: all goals met within established timelines  Discharge Destination: other (see comments) (Remains in acute carer)    Evy Montalvo MS CCC-SLP  7/17/2024

## 2024-07-17 NOTE — PLAN OF CARE
Goal Outcome Evaluation:  Plan of Care Reviewed With: patient, spouse        Progress: improving  Outcome Evaluation: RDN assessment completed. Pt admitted with altered mental status. HgbA1c 6.5%.RDN provided pt with diabetes education. Pt reports he drinks [place].D[place] daily. Recommended diet soda and/or increase water intake. Encouraged complex carbohydrates such as whole grains,starchy vegetables,no starchy vegetables,lean sorces of meats and low fat dairy. Provided What can I eat from ADA and [place] [employer] meal planning guide. Spouse at bedside reports she is also diabetic. Discussed risk factors with elevated blood glucose and HbgA1c. Pt reports his appetite is good. Cont to follow for plan of care.

## 2024-07-17 NOTE — PLAN OF CARE
Problem: Adult Inpatient Plan of Care  Goal: Plan of Care Review  Outcome: Ongoing, Progressing  Flowsheets (Taken 7/17/2024 0430)  Progress: no change  Plan of Care Reviewed With: patient  Outcome Evaluation: Pt is A&Ox4. PRn cough meds given with some relief noted. Voiding per urinal. No neuro changes noted. VSS. Safety maintained.

## 2024-07-18 LAB
ACE CSF-CCNC: <1.5 U/L (ref 0–3.1)
ANION GAP SERPL CALCULATED.3IONS-SCNC: 8 MMOL/L (ref 5–15)
AQP4 H2O CHANNEL IGG SERPL IA-ACNC: <1.5 U/ML (ref 0–3)
BUN SERPL-MCNC: 23 MG/DL (ref 8–23)
BUN/CREAT SERPL: 22.3 (ref 7–25)
CALCIUM SPEC-SCNC: 8.6 MG/DL (ref 8.6–10.5)
CHLORIDE SERPL-SCNC: 104 MMOL/L (ref 98–107)
CO2 SERPL-SCNC: 26 MMOL/L (ref 22–29)
CREAT SERPL-MCNC: 1.03 MG/DL (ref 0.76–1.27)
DEPRECATED RDW RBC AUTO: 42.3 FL (ref 37–54)
EGFRCR SERPLBLD CKD-EPI 2021: 83.2 ML/MIN/1.73
ERYTHROCYTE [DISTWIDTH] IN BLOOD BY AUTOMATED COUNT: 13.2 % (ref 12.3–15.4)
GLUCOSE SERPL-MCNC: 155 MG/DL (ref 65–99)
HCT VFR BLD AUTO: 41 % (ref 37.5–51)
HGB BLD-MCNC: 13.7 G/DL (ref 13–17.7)
MCH RBC QN AUTO: 29.3 PG (ref 26.6–33)
MCHC RBC AUTO-ENTMCNC: 33.4 G/DL (ref 31.5–35.7)
MCV RBC AUTO: 87.6 FL (ref 79–97)
PLATELET # BLD AUTO: 199 10*3/MM3 (ref 140–450)
PMV BLD AUTO: 10.6 FL (ref 6–12)
POTASSIUM SERPL-SCNC: 4.3 MMOL/L (ref 3.5–5.2)
RBC # BLD AUTO: 4.68 10*6/MM3 (ref 4.14–5.8)
SODIUM SERPL-SCNC: 138 MMOL/L (ref 136–145)
WBC NRBC COR # BLD AUTO: 10.98 10*3/MM3 (ref 3.4–10.8)

## 2024-07-18 PROCEDURE — 80048 BASIC METABOLIC PNL TOTAL CA: CPT | Performed by: FAMILY MEDICINE

## 2024-07-18 PROCEDURE — 99232 SBSQ HOSP IP/OBS MODERATE 35: CPT | Performed by: STUDENT IN AN ORGANIZED HEALTH CARE EDUCATION/TRAINING PROGRAM

## 2024-07-18 PROCEDURE — 85027 COMPLETE CBC AUTOMATED: CPT | Performed by: FAMILY MEDICINE

## 2024-07-18 PROCEDURE — 25010000002 CEFTRIAXONE PER 250 MG: Performed by: FAMILY MEDICINE

## 2024-07-18 PROCEDURE — 25010000002 METHYLPREDNISOLONE PER 125 MG: Performed by: STUDENT IN AN ORGANIZED HEALTH CARE EDUCATION/TRAINING PROGRAM

## 2024-07-18 RX ORDER — LISINOPRIL 20 MG/1
20 TABLET ORAL DAILY
Status: DISCONTINUED | OUTPATIENT
Start: 2024-07-19 | End: 2024-07-18

## 2024-07-18 RX ORDER — LOSARTAN POTASSIUM 50 MG/1
50 TABLET ORAL
Status: DISCONTINUED | OUTPATIENT
Start: 2024-07-19 | End: 2024-07-19 | Stop reason: HOSPADM

## 2024-07-18 RX ADMIN — BENZONATATE 200 MG: 100 CAPSULE ORAL at 20:37

## 2024-07-18 RX ADMIN — CARVEDILOL 6.25 MG: 6.25 TABLET, FILM COATED ORAL at 18:24

## 2024-07-18 RX ADMIN — Medication 10 ML: at 10:30

## 2024-07-18 RX ADMIN — ASPIRIN 325 MG: 325 TABLET, FILM COATED ORAL at 10:18

## 2024-07-18 RX ADMIN — PANTOPRAZOLE SODIUM 40 MG: 40 TABLET, DELAYED RELEASE ORAL at 10:18

## 2024-07-18 RX ADMIN — Medication 10 ML: at 20:37

## 2024-07-18 RX ADMIN — SODIUM CHLORIDE 1000 MG: 900 INJECTION INTRAVENOUS at 10:30

## 2024-07-18 RX ADMIN — LISINOPRIL 10 MG: 10 TABLET ORAL at 10:32

## 2024-07-18 RX ADMIN — SODIUM CHLORIDE 1000 MG: 9 INJECTION, SOLUTION INTRAVENOUS at 10:00

## 2024-07-18 RX ADMIN — CARVEDILOL 6.25 MG: 6.25 TABLET, FILM COATED ORAL at 10:18

## 2024-07-18 RX ADMIN — PANTOPRAZOLE SODIUM 40 MG: 40 TABLET, DELAYED RELEASE ORAL at 18:24

## 2024-07-18 RX ADMIN — ATORVASTATIN CALCIUM 80 MG: 40 TABLET ORAL at 20:37

## 2024-07-18 RX ADMIN — BENZONATATE 200 MG: 100 CAPSULE ORAL at 03:54

## 2024-07-18 NOTE — PLAN OF CARE
Goal Outcome Evaluation:  Plan of Care Reviewed With: patient, spouse        Progress: improving  Outcome Evaluation: Upon therapist arrival to room, pt was standing up in the BR performing grooming tasks & UBD independently. Spoke with pt & spouse regarding role of OT & current level of function. Pt seems to have reached his baseline fxn & met all goals per pt & spouse report. OT to sign off at this time, pt is safe to d/c home once medically stable.      Anticipated Discharge Disposition (OT): home with assist

## 2024-07-18 NOTE — PLAN OF CARE
Goal Outcome Evaluation:                   Pt is A&ox4. VSS, Alert and oriented x4 but forgetful at times. Up with standby assist.  No neuro changes.All safety precautions maintained and call light in reach. Will continue to monitor.

## 2024-07-18 NOTE — PROGRESS NOTES
[facility]  INPATIENT PROGRESS NOTE    Patient Name: Ty Webb  Date of Admission: 7/13/2024  Today's Date: 07/18/24  Length of Stay: 5  Primary Care Physician: Irineo Cunningham APRN    Subjective   Chief Complaint: Altered mental status.  HPI   Altered mental status resolved.  Blood pressure slightly high, afebrile.  Lisinopril to Cozaar due to cough.    Review of Systems   Constitutional:  Positive for activity change and appetite change. Negative for chills and fever.   HENT:  Negative for hearing loss, nosebleeds, tinnitus and trouble swallowing.    Eyes:  Negative for visual disturbance.   Respiratory:  Negative for cough, chest tightness, shortness of breath and wheezing.    Cardiovascular:  Negative for chest pain, palpitations and leg swelling.   Gastrointestinal:  Negative for abdominal distention, abdominal pain, blood in stool, constipation, diarrhea, nausea and vomiting.   Endocrine: Negative for cold intolerance, heat intolerance, polydipsia, polyphagia and polyuria.   Genitourinary:  Negative for decreased urine volume, difficulty urinating, dysuria, flank pain, frequency and hematuria.   Musculoskeletal: Gait and dysmotility resolved.    Negative for joint swelling.   Skin:  Negative for rash.   Allergic/Immunologic: Negative for immunocompromised state.   Neurological:  Negative for dizziness, syncope, weakness, light-headedness and headaches.   Hematological:  Negative for adenopathy. Does not bruise/bleed easily.   Psychiatric/Behavioral:  Positive for confusion. Negative for sleep disturbance. The patient is not nervous/anxious.    All pertinent negatives and positives are as above. All other systems have been reviewed and are negative unless otherwise stated.     Objective    Temp:  [97.6 °F (36.4 °C)-98.1 °F (36.7 °C)] 97.9 °F (36.6 °C)  Heart Rate:  [63-84] 71  Resp:  [16-18] 18  BP: (132-154)/(71-91) 154/88  Physical Exam  Vitals and  nursing note reviewed.   HENT:      Head: Normocephalic and atraumatic.   Eyes:      Conjunctiva/sclera: Conjunctivae normal.      Pupils: Pupils are equal, round, and reactive to light.   Cardiovascular:      Rate and Rhythm: Normal rate and regular rhythm.      Heart sounds: Normal heart sounds.   Pulmonary:      Effort: Pulmonary effort is normal. No respiratory distress.      Breath sounds: Normal breath sounds.   Abdominal:      General: Bowel sounds are normal. There is no distension.      Palpations: Abdomen is soft.      Tenderness: There is no abdominal tenderness.   Musculoskeletal:         General: No swelling.      Cervical back: Neck supple.   Skin:     General: Skin is warm and dry.      Capillary Refill: Capillary refill takes 2 to 3 seconds.      Findings: No rash.   Neurological:      General: No focal deficit present.      Mental Status: He is alert and oriented to person, place, and time.   Psychiatric:         Mood and Affect: Mood normal.         Behavior: Behavior normal.         Thought Content: Thought content normal.         Judgment: Judgment normal.             Results Review:  I have reviewed the labs, radiology results, and diagnostic studies.    Laboratory Data:   Results from last 7 days   Lab Units 07/18/24  0431 07/17/24  0431 07/16/24  0436   WBC 10*3/mm3 10.98* 14.40* 8.44   HEMOGLOBIN g/dL 13.7 13.6 15.5   HEMATOCRIT % 41.0 40.7 44.9   PLATELETS 10*3/mm3 199 209 206        Results from last 7 days   Lab Units 07/18/24  0431 07/17/24  0431 07/16/24  0436 07/15/24  0418 07/13/24  1022   SODIUM mmol/L 138 138 138   < > 135*   POTASSIUM mmol/L 4.3 4.6 4.2   < > 4.2   CHLORIDE mmol/L 104 104 102   < > 98   CO2 mmol/L 26.0 24.0 23.0   < > 26.0   BUN mg/dL 23 25* 24*   < > 14   CREATININE mg/dL 1.03 1.03 1.08   < > 1.04   CALCIUM mg/dL 8.6 8.7 8.8   < > 8.9   BILIRUBIN mg/dL  --   --  0.5  --  0.6   ALK PHOS U/L  --   --  60  --  66   ALT (SGPT) U/L  --   --  49*  --  31   AST (SGOT) U/L   --   --  25  --  22   GLUCOSE mg/dL 155* 159* 181*   < > 156*    < > = values in this interval not displayed.       Culture Data:   Blood Culture   Date Value Ref Range Status   07/14/2024 No growth at 3 days  Preliminary   07/14/2024 No growth at 3 days  Preliminary       Radiology Data:   Imaging Results (Last 24 Hours)       ** No results found for the last 24 hours. **            I have reviewed the patient's current medications.     Assessment/Plan   Assessment  Active Hospital Problems    Diagnosis     **Altered mental status     Essential hypertension     Gastroesophageal reflux disease without esophagitis     Mixed hyperlipidemia        Treatment Plan  Altered mental status/Acute encephalopathy.  Altered mental status resolved.  Patient is oriented x 3.  Possible autoimmune encephalitis.  IV hydration.  Neurology consult.  . Geodon as needed.  Status post lumbar puncture 7/13/2024.  No evidence infection at this time.  High-dose steroids started by neurology.  MRI of the brain-Extensive paranasal sinus mucosal thickening, No acute intracranial abnormality identified.  UDS-negative.  EEG-Diffuse cerebral dysfunction of mild degree but nonspecific-  is most commonly seen due to toxic/metabolic cause, No evidence for epilepsy is seen on the study.  Will discharge tomorrow after last dose of IV steroid.  Patient need to follow-up with neurology 1 to 2 weeks outpatient.     Sinusitis/cough.  Rocephin.  Tessalon Perles as needed.  Leukocytosis improving.  Chest x-ray-No active disease in the chest.   Patient is on room air.     Hyperlipidemia/hypertension.  Continue Coreg.  Change lisinopril to Cozaar due to cough.  Continue Lipitor.  Echocardiogram-ejection fraction 56 to 60%, saline test negative, no obvious valvular abnormality identified in this study.  Family request to DC telemetry and IV fluids to gets around better.     Reflux.  Zofran as needed.  Protonix.     Abdomen pain/multiple nonobstructing renal  calculi measuring up to 10 mm/cholelithiasis.  Nontender abdomen.  CT scan of abdomen-  No acute findings in the abdomen,  Multiple nonobstructing left renal calculi measuring up to 10 mm- No  Hydronephrosis, Cholelithiasis without evidence of cholecystitis,  Bibasilar atelectasis.     Hemoglobin A1c 6.5.  Diabetic educator.     Nutrition .  Regular/house diet.     Deconditioning.  PT and OT consult.     Blood cultures-no growth for 3 days.  West Nile-negative.  Varicella negative.  Meningitis panel-negative.  COVID-19-negative.  Flu screen-negative.     Medical Decision Making  Number and Complexity of problems: Altered mental status/sinus/hypertension/hyperlipidemia  Differential Diagnosis: None     Conditions and Status        Condition is unchanged.     MDM Data  External documents reviewed: Previous note  Cardiac tracing (EKG, telemetry) interpretation: Sinus  Radiology interpretation: MRI/EEG/chest x-ray  Labs reviewed: Laboratory  Any tests that were considered but not ordered: Lab in a.m.     Decision rules/scores evaluated (example [id number], Wells, etc): None     Discussed with: Patient      Care Planning  Shared decision making: Patient  Code status and discussions: Full code     Disposition  Social Determinants of Health that impact treatment or disposition: From home  Probably discharge tomorrow.    Electronically signed by Leobardo Cerna MD, 07/18/24, 11:24 CDT.

## 2024-07-18 NOTE — PLAN OF CARE
Problem: Adult Inpatient Plan of Care  Goal: Plan of Care Review  Outcome: Ongoing, Progressing  Flowsheets (Taken 7/18/2024 0400)  Progress: no change  Plan of Care Reviewed With: patient  Outcome Evaluation: Pt is A&ox4. forgetful at times. Up with standby assist. PRN cough meds given with relief noted. No neuro changes. VSS. Safety maintained.

## 2024-07-18 NOTE — PAYOR COMM NOTE
"7/17 CLINICAL  [id number]    [id number]    Joey Argueta (58 y.o. Male)       Date of Birth   1965    Social Security Number       Address   [address] [place] [address]    Home Phone   [phone]    MRN   [id number]       [facility]    Marital Status                               Admission Date   7/13/24    Admission Type   Emergency    Admitting Provider   Leobardo Cerna MD    Attending Provider   Leobardo Cerna MD    Department, Room/Bed   [facility] 3A, 353/1       Discharge Date       Discharge Disposition       Discharge Destination                                 Attending Provider: Leobardo Cerna MD    Allergies: No Known Allergies    Isolation: None   Infection: None   Code Status: CPR    Ht: 203.2 cm (80\")   Wt: 122 kg (270 lb)    Admission Cmt: None   Principal Problem: Altered mental status [R41.82]                   Active Insurance as of 7/13/2024       Primary Coverage       Payor Plan Insurance Group Employer/Plan Group    [facility] [employer] [facility] PPO [id number]       Payor Plan Address Payor Plan Phone Number Payor Plan Fax Number Effective Dates    [address] [phone]  1/1/2019 - None Entered    [place] [address]         Subscriber Name Subscriber Birth Date Member ID       JOEY ARGUETA 1965 [id number]                     Emergency Contacts        (Rel.) Home Phone Work Phone Mobile Phone    Jennie Argueta (Spouse) [phone] -- [phone]              Current Facility-Administered Medications   Medication Dose Route Frequency Provider Last Rate Last Admin    acetaminophen (TYLENOL) tablet 650 mg  650 mg Oral Q6H PRN Andrew Corrigan MD        Or    acetaminophen (TYLENOL) suppository 650 mg  650 mg Rectal Q6H PRN Andrew Corrigan MD   650 mg at 07/14/24 1403    aspirin tablet 325 mg  325 mg Oral Daily Andrew Corrigan MD   325 mg at 07/18/24 1018    Or    aspirin suppository 300 mg  300 mg " Rectal Daily Andrew Corrigan MD   300 mg at 07/13/24 1701    atorvastatin (LIPITOR) tablet 80 mg  80 mg Oral Nightly Andrew Corrigan MD   80 mg at 07/17/24 2024    benzonatate (TESSALON) capsule 200 mg  200 mg Oral TID PRN Leobardo Cerna MD   200 mg at 07/18/24 0354    carvedilol (COREG) tablet 6.25 mg  6.25 mg Oral BID With Meals Leobardo Cerna MD   6.25 mg at 07/18/24 1018    cefTRIAXone (ROCEPHIN) 1,000 mg in sodium chloride 0.9 % 100 mL MBP  1,000 mg Intravenous Q24H Leobardo Cerna  mL/hr at 07/18/24 1030 1,000 mg at 07/18/24 1030    [START ON 7/19/2024] losartan (COZAAR) tablet 50 mg  50 mg Oral Q24H Leobardo Cerna MD        melatonin tablet 5 mg  5 mg Oral Nightly PRN Kishore Key MD   5 mg at 07/17/24 2225    methylPREDNISolone sodium succinate (SOLU-Medrol) 1,000 mg in sodium chloride 0.9 % 100 mL IVPB  1,000 mg Intravenous Daily Kishore Key  mL/hr at 07/18/24 1000 1,000 mg at 07/18/24 1000    Followed by    [START ON 7/20/2024] predniSONE (DELTASONE) tablet 60 mg  60 mg Oral Daily With Breakfast Kishore Key MD        Followed by    [START ON 8/3/2024] predniSONE (DELTASONE) tablet 50 mg  50 mg Oral Daily With Breakfast Kishore Key MD        Followed by    [START ON 8/17/2024] predniSONE (DELTASONE) tablet 40 mg  40 mg Oral Daily With Breakfast Kishore Key MD        Followed by    [START ON 8/31/2024] predniSONE (DELTASONE) tablet 30 mg  30 mg Oral Daily With Breakfast Kishore Key MD        Followed by    [START ON 9/14/2024] predniSONE (DELTASONE) tablet 20 mg  20 mg Oral Daily With Breakfast Kishore Key MD        ondansetron (ZOFRAN) injection 4 mg  4 mg Intravenous Q6H PRN Leobardo Cerna MD        pantoprazole (PROTONIX) EC tablet 40 mg  40 mg Oral BID With Meals Leobardo Cerna MD   40 mg at 07/18/24 1018    sodium chloride 0.9 % flush 10 mL  10 mL Intravenous Q12H Andrew Corrigan MD   10 mL at 07/18/24 1030     "sodium chloride 0.9 % flush 10 mL  10 mL Intravenous PRN Andrew Corrigan MD        sodium chloride 0.9 % infusion 40 mL  40 mL Intravenous PRN Andrew Corrigan MD        ziprasidone (GEODON) injection 10 mg  10 mg Intramuscular Q6H PRN Andrew Corrigan MD   10 mg at 07/15/24 1226     Orders (last 24 hrs)        Start     Ordered    09/14/24 0800  predniSONE (DELTASONE) tablet 20 mg  Daily With Breakfast        Placed in \"Followed by\" Linked Group    07/15/24 1309    08/31/24 0800  predniSONE (DELTASONE) tablet 30 mg  Daily With Breakfast        Placed in \"Followed by\" Linked Group    07/15/24 1309    08/17/24 0800  predniSONE (DELTASONE) tablet 40 mg  Daily With Breakfast        Placed in \"Followed by\" Linked Group    07/15/24 1309    08/03/24 0800  predniSONE (DELTASONE) tablet 50 mg  Daily With Breakfast        Placed in \"Followed by\" Linked Group    07/15/24 1309    07/20/24 0900  predniSONE (DELTASONE) tablet 60 mg  Daily With Breakfast        Placed in \"Followed by\" Linked Group    07/15/24 1309    07/19/24 0900  lisinopril (PRINIVIL,ZESTRIL) tablet 20 mg  Daily,   Status:  Discontinued         07/18/24 1128    07/19/24 0900  losartan (COZAAR) tablet 50 mg  Every 24 Hours Scheduled         07/18/24 1129    07/18/24 0600  Basic Metabolic Panel  Daily       07/17/24 1022    07/18/24 0600  CBC (No Diff)  Daily       07/17/24 1022    07/17/24 1830  pantoprazole (PROTONIX) EC tablet 40 mg  2 Times Daily With Meals         07/17/24 1741    07/17/24 1611  Anti-N-methyl-D-Aspartate Receptor (NMDAR), Spinal Fluid - Blood, CSF [place]  Once         07/17/24 1610    07/17/24 1115  lisinopril (PRINIVIL,ZESTRIL) tablet 10 mg  Daily,   Status:  Discontinued         07/17/24 1028    07/17/24 1115  carvedilol (COREG) tablet 6.25 mg  2 Times Daily With Meals         07/17/24 1028    07/17/24 0000  esomeprazole (nexIUM) 40 MG capsule  2 Times Daily With Meals         07/13/24 1713    07/16/24 1448  benzonatate (TESSALON) " "capsule 200 mg  3 Times Daily PRN         07/16/24 1448    07/15/24 1400  methylPREDNISolone sodium succinate (SOLU-Medrol) 1,000 mg in sodium chloride 0.9 % 100 mL IVPB  Daily        Placed in \"Followed by\" Linked Group    07/15/24 1309    07/15/24 1311  melatonin tablet 5 mg  Nightly PRN         07/15/24 1311    07/15/24 0945  cefTRIAXone (ROCEPHIN) 1,000 mg in sodium chloride 0.9 % 100 mL MBP  Every 24 Hours         07/15/24 0858    07/15/24 0856  ondansetron (ZOFRAN) injection 4 mg  Every 6 Hours PRN         07/15/24 0856    07/14/24 1325  acetaminophen (TYLENOL) tablet 650 mg  Every 6 Hours PRN        Placed in \"Or\" Linked Group    07/14/24 1325    07/14/24 1325  acetaminophen (TYLENOL) suppository 650 mg  Every 6 Hours PRN        Placed in \"Or\" Linked Group    07/14/24 1325    07/13/24 2100  sodium chloride 0.9 % flush 10 mL  Every 12 Hours Scheduled         07/13/24 1515    07/13/24 2100  atorvastatin (LIPITOR) tablet 80 mg  Nightly         07/13/24 1515    07/13/24 1758  ziprasidone (GEODON) injection 10 mg  Every 6 Hours PRN         07/13/24 1758    07/13/24 1615  aspirin tablet 325 mg  Daily        Placed in \"Or\" Linked Group    07/13/24 1515    07/13/24 1615  aspirin suppository 300 mg  Daily        Placed in \"Or\" Linked Group    07/13/24 1515    07/13/24 1600  Intake and Output  Every 4 Hours       07/13/24 1515    07/13/24 1515  sodium chloride 0.9 % flush 10 mL  As Needed         07/13/24 1515    07/13/24 1515  sodium chloride 0.9 % infusion 40 mL  As Needed         07/13/24 1515    Unscheduled  Order CT Head Without Contrast for Neurological Decline  As Needed       07/13/24 1515    Unscheduled  Wound Care  As Needed       07/15/24 0648    --  carvedilol (COREG) 25 MG tablet  2 Times Daily With Meals         07/13/24 1633    --  methylPREDNISolone (MEDROL) 4 MG tablet  Daily         07/13/24 1641    --  atorvastatin (LIPITOR) 10 MG tablet  Daily         07/13/24 1711    --  " brompheniramine-pseudoephedrine-DM 30-2-10 MG/5ML syrup  4 Times Daily PRN         07/13/24 1712    Pending  cefdinir (OMNICEF) capsule 300 mg  Every 12 Hours Scheduled         Pending                  Operative/Procedure Notes (last 4 days)  Notes from 07/14/24 1223 through 07/18/24 1223   No notes of this type exist for this encounter.          Physician Progress Notes (last 48 hours)        Leobardo Cerna MD at 07/18/24 1124              [facility] Medicine Services  INPATIENT PROGRESS NOTE    Patient Name: Ty Webb  Date of Admission: 7/13/2024  Today's Date: 07/18/24  Length of Stay: 5  Primary Care Physician: Irineo Cunningham APRN    Subjective   Chief Complaint: Altered mental status.  HPI   Altered mental status resolved.  Blood pressure slightly high, afebrile.  Lisinopril to Cozaar due to cough.    Review of Systems   Constitutional:  Positive for activity change and appetite change. Negative for chills and fever.   HENT:  Negative for hearing loss, nosebleeds, tinnitus and trouble swallowing.    Eyes:  Negative for visual disturbance.   Respiratory:  Negative for cough, chest tightness, shortness of breath and wheezing.    Cardiovascular:  Negative for chest pain, palpitations and leg swelling.   Gastrointestinal:  Negative for abdominal distention, abdominal pain, blood in stool, constipation, diarrhea, nausea and vomiting.   Endocrine: Negative for cold intolerance, heat intolerance, polydipsia, polyphagia and polyuria.   Genitourinary:  Negative for decreased urine volume, difficulty urinating, dysuria, flank pain, frequency and hematuria.   Musculoskeletal: Gait and dysmotility resolved.    Negative for joint swelling.   Skin:  Negative for rash.   Allergic/Immunologic: Negative for immunocompromised state.   Neurological:  Negative for dizziness, syncope, weakness, light-headedness and headaches.   Hematological:  Negative for adenopathy. Does not bruise/bleed  easily.   Psychiatric/Behavioral:  Positive for confusion. Negative for sleep disturbance. The patient is not nervous/anxious.    All pertinent negatives and positives are as above. All other systems have been reviewed and are negative unless otherwise stated.     Objective    Temp:  [97.6 °F (36.4 °C)-98.1 °F (36.7 °C)] 97.9 °F (36.6 °C)  Heart Rate:  [63-84] 71  Resp:  [16-18] 18  BP: (132-154)/(71-91) 154/88  Physical Exam  Vitals and nursing note reviewed.   HENT:      Head: Normocephalic and atraumatic.   Eyes:      Conjunctiva/sclera: Conjunctivae normal.      Pupils: Pupils are equal, round, and reactive to light.   Cardiovascular:      Rate and Rhythm: Normal rate and regular rhythm.      Heart sounds: Normal heart sounds.   Pulmonary:      Effort: Pulmonary effort is normal. No respiratory distress.      Breath sounds: Normal breath sounds.   Abdominal:      General: Bowel sounds are normal. There is no distension.      Palpations: Abdomen is soft.      Tenderness: There is no abdominal tenderness.   Musculoskeletal:         General: No swelling.      Cervical back: Neck supple.   Skin:     General: Skin is warm and dry.      Capillary Refill: Capillary refill takes 2 to 3 seconds.      Findings: No rash.   Neurological:      General: No focal deficit present.      Mental Status: He is alert and oriented to person, place, and time.   Psychiatric:         Mood and Affect: Mood normal.         Behavior: Behavior normal.         Thought Content: Thought content normal.         Judgment: Judgment normal.             Results Review:  I have reviewed the labs, radiology results, and diagnostic studies.    Laboratory Data:   Results from last 7 days   Lab Units 07/18/24  0431 07/17/24  0431 07/16/24  0436   WBC 10*3/mm3 10.98* 14.40* 8.44   HEMOGLOBIN g/dL 13.7 13.6 15.5   HEMATOCRIT % 41.0 40.7 44.9   PLATELETS 10*3/mm3 199 209 206        Results from last 7 days   Lab Units 07/18/24  0431 07/17/24  0431  07/16/24  0436 07/15/24  0418 07/13/24  1022   SODIUM mmol/L 138 138 138   < > 135*   POTASSIUM mmol/L 4.3 4.6 4.2   < > 4.2   CHLORIDE mmol/L 104 104 102   < > 98   CO2 mmol/L 26.0 24.0 23.0   < > 26.0   BUN mg/dL 23 25* 24*   < > 14   CREATININE mg/dL 1.03 1.03 1.08   < > 1.04   CALCIUM mg/dL 8.6 8.7 8.8   < > 8.9   BILIRUBIN mg/dL  --   --  0.5  --  0.6   ALK PHOS U/L  --   --  60  --  66   ALT (SGPT) U/L  --   --  49*  --  31   AST (SGOT) U/L  --   --  25  --  22   GLUCOSE mg/dL 155* 159* 181*   < > 156*    < > = values in this interval not displayed.       Culture Data:   Blood Culture   Date Value Ref Range Status   07/14/2024 No growth at 3 days  Preliminary   07/14/2024 No growth at 3 days  Preliminary       Radiology Data:   Imaging Results (Last 24 Hours)       ** No results found for the last 24 hours. **            I have reviewed the patient's current medications.     Assessment/Plan   Assessment  Active Hospital Problems    Diagnosis     **Altered mental status     Essential hypertension     Gastroesophageal reflux disease without esophagitis     Mixed hyperlipidemia        Treatment Plan  Altered mental status/Acute encephalopathy.  Altered mental status resolved.  Patient is oriented x 3.  Possible autoimmune encephalitis.  IV hydration.  Neurology consult.  . Geodon as needed.  Status post lumbar puncture 7/13/2024.  No evidence infection at this time.  High-dose steroids started by neurology.  MRI of the brain-Extensive paranasal sinus mucosal thickening, No acute intracranial abnormality identified.  UDS-negative.  EEG-Diffuse cerebral dysfunction of mild degree but nonspecific-  is most commonly seen due to toxic/metabolic cause, No evidence for epilepsy is seen on the study.  Will discharge tomorrow after last dose of IV steroid.  Patient need to follow-up with neurology 1 to 2 weeks outpatient.     Sinusitis/cough.  Rocephin.  Tessalon Perles as needed.  Leukocytosis improving.  Chest x-ray-No  active disease in the chest.   Patient is on room air.     Hyperlipidemia/hypertension.  Continue Coreg.  Change lisinopril to Cozaar due to cough.  Continue Lipitor.  Echocardiogram-ejection fraction 56 to 60%, saline test negative, no obvious valvular abnormality identified in this study.  Family request to DC telemetry and IV fluids to gets around better.     Reflux.  Zofran as needed.  Protonix.     Abdomen pain/multiple nonobstructing renal calculi measuring up to 10 mm/cholelithiasis.  Nontender abdomen.  CT scan of abdomen-  No acute findings in the abdomen,  Multiple nonobstructing left renal calculi measuring up to 10 mm- No  Hydronephrosis, Cholelithiasis without evidence of cholecystitis,  Bibasilar atelectasis.     Hemoglobin A1c 6.5.  Diabetic educator.     Nutrition .  Regular/house diet.     Deconditioning.  PT and OT consult.     Blood cultures-no growth for 3 days.  West Nile-negative.  Varicella negative.  Meningitis panel-negative.  COVID-19-negative.  Flu screen-negative.     Medical Decision Making  Number and Complexity of problems: Altered mental status/sinus/hypertension/hyperlipidemia  Differential Diagnosis: None     Conditions and Status        Condition is unchanged.     [facility] Data  External documents reviewed: Previous note  Cardiac tracing (EKG, telemetry) interpretation: Sinus  Radiology interpretation: MRI/EEG/chest x-ray  Labs reviewed: Laboratory  Any tests that were considered but not ordered: Lab in a.m.     Decision rules/scores evaluated (example [id number], Wells, etc): None     Discussed with: Patient      Care Planning  Shared decision making: Patient  Code status and discussions: Full code     Disposition  Social Determinants of Health that impact treatment or disposition: From home  Probably discharge tomorrow.    Electronically signed by Leobardo Ceran MD, 07/18/24, 11:24 CDT.    Electronically signed by Leobardo Cerna MD at 07/18/24 4478       Kishore Key MD  at 07/17/24 1539            Neurology Progress Note      Chief Complaint:  Encephalopathy  Length of Stay:  4     Interval     7/17: Simlar improvement in neurological exam as yesterday.  Received D3 of IV Methylpred 1 g.  Blood cultures from 7/14 no growth to date.  Specialized labs pending.    7/16: Significant improvement in neurological exam.  Received D2 of IV Methylpred 1 g.  Blood cultures from 7/14 no growth to date.  Specialized labs pending.  EEG from 715 with diffuse cerebral dysfunction of mild degree.    7/15: Improvement in his mental status as fever improved.  D1 of ceftriaxone for severe sinusitis.  D1 of IV methylprednisolone 1 g daily for 5 days.  Blood cultures from 7/14 no growth to date.  Specialized labs pending.    7/13-14: Lumbar puncture performed, initial labs remarkable for mild pleocytosis and mild elevation of protein.  Meningitis/encephalitis panel PCR negative, West Nile PCR negative, VZV PCR negative, blood cultures drawn.  B12 and folate normal.  TSH normal.  UDS negative.  Specialized lab sent: MS propofol and MBP, NMDA and CSF, encephalopathy panel, ACE, paraneoplastic panel, thiamine, NMO IgG.      Subjective     Subjective:  Seen at bedside with wife and parents-in-law, who reported significant neurological recovery from yesterday continues.  Patient is in good spirits no complaints.    Medications:  Current Facility-Administered Medications   Medication Dose Route Frequency Provider Last Rate Last Admin    acetaminophen (TYLENOL) tablet 650 mg  650 mg Oral Q6H PRN Andrew Corrigan MD        Or    acetaminophen (TYLENOL) suppository 650 mg  650 mg Rectal Q6H PRN Andrew Corrigan MD   650 mg at 07/14/24 1403    aspirin tablet 325 mg  325 mg Oral Daily Andrew Corrigan MD   325 mg at 07/17/24 0956    Or    aspirin suppository 300 mg  300 mg Rectal Daily Andrew Corrigan MD   300 mg at 07/13/24 1701    atorvastatin (LIPITOR) tablet 80 mg  80 mg Oral Nightly Andrew Corrigan MD    80 mg at 07/16/24 2008    benzonatate (TESSALON) capsule 200 mg  200 mg Oral TID PRN Leobardo Cerna MD   200 mg at 07/17/24 0450    carvedilol (COREG) tablet 6.25 mg  6.25 mg Oral BID With Meals Leobardo Cerna MD   6.25 mg at 07/17/24 1140    cefTRIAXone (ROCEPHIN) 1,000 mg in sodium chloride 0.9 % 100 mL MBP  1,000 mg Intravenous Q24H Leobardo Cerna  mL/hr at 07/17/24 0956 1,000 mg at 07/17/24 0956    lisinopril (PRINIVIL,ZESTRIL) tablet 10 mg  10 mg Oral Daily Leobardo Cerna MD   10 mg at 07/17/24 1140    melatonin tablet 5 mg  5 mg Oral Nightly PRN Kishore Key MD   5 mg at 07/17/24 0023    methylPREDNISolone sodium succinate (SOLU-Medrol) 1,000 mg in sodium chloride 0.9 % 100 mL IVPB  1,000 mg Intravenous Daily Kishore Key  mL/hr at 07/17/24 0956 1,000 mg at 07/17/24 0956    Followed by    [START ON 7/20/2024] predniSONE (DELTASONE) tablet 60 mg  60 mg Oral Daily With Breakfast Kishore Key MD        Followed by    [START ON 8/3/2024] predniSONE (DELTASONE) tablet 50 mg  50 mg Oral Daily With Breakfast Kishore Key MD        Followed by    [START ON 8/17/2024] predniSONE (DELTASONE) tablet 40 mg  40 mg Oral Daily With Breakfast Kishore Key MD        Followed by    [START ON 8/31/2024] predniSONE (DELTASONE) tablet 30 mg  30 mg Oral Daily With Breakfast Kishore Key MD        Followed by    [START ON 9/14/2024] predniSONE (DELTASONE) tablet 20 mg  20 mg Oral Daily With Breakfast Kishore Key MD        ondansetron (ZOFRAN) injection 4 mg  4 mg Intravenous Q6H PRN Leobardo Cerna MD        sodium chloride 0.9 % flush 10 mL  10 mL Intravenous Q12H Andrew Corrigan MD   10 mL at 07/17/24 0956    sodium chloride 0.9 % flush 10 mL  10 mL Intravenous PRN Andrew Corrigan MD        sodium chloride 0.9 % infusion 40 mL  40 mL Intravenous PRN Andrew Corrigan MD        ziprasidone (GEODON) injection 10 mg  10 mg Intramuscular Q6H PRN  Andrew Corrigan MD   10 mg at 07/15/24 1226             Objective      Vital Signs  Temp:  [97.6 °F (36.4 °C)-98.6 °F (37 °C)] 97.9 °F (36.6 °C)  Heart Rate:  [78-97] 78  Resp:  [16-19] 16  BP: (148-158)/(85-98) 148/85    Physical Exam:    No evidence of neck stiffness  No evidence of photophobia.  The lights are on bright room and the patient does not show any concern in regards to this.  No signs that the patient is in pain nor has a headache    Pertinent Neuro Exam:  Awake.  With reduced verbal output however able to form sentences that are complex.  Follows simple and complex commands, is able to repeat complex sentences, no word finding difficulty even with low-frequency words.  Able to estimate 7 quarters in $1.75, able to say the days of the week backwards without hesitation, some difficulty naming the months of the year backwards with skipping 2 months although faster than yesterday.  Mild difficulty with serial sevens.  Unable to perform any Z fingers.  Cranial nerves II to XII intact  Moving all extremities with great strength  No pathologic reflexes  No signs of gross ataxia      Last nurse assessment:  Interval:  (handoff)  1a. Level of Consciousness: 0-->Alert, keenly responsive  1b. LOC Questions: 0-->Answers both questions correctly  1c. LOC Commands: 0-->Performs both tasks correctly  2. Best Gaze: 0-->Normal  3. Visual: 0-->No visual loss  4. Facial Palsy: 0-->Normal symmetrical movements  5a. Motor Arm, Left: 0-->No drift, limb holds 90 (or 45) degrees for full 10 secs  5b. Motor Arm, Right: 0-->No drift, limb holds 90 (or 45) degrees for full 10 secs  6a. Motor Leg, Left: 0-->No drift, leg holds 30 degree position for full 5 secs  6b. Motor Leg, Right: 0-->No drift, leg holds 30 degree position for full 5 secs  7. Limb Ataxia: 1-->Present in one limb  8. Sensory: 0-->Normal, no sensory loss  9. Best Language: 1-->Mild-to-moderate aphasia, some obvious loss of fluency or facility of  comprehension, without significant limitation on ideas expressed or form of expression. Reduction of speech and/or comprehension, however, makes conversation. . . (see row details)  10. Dysarthria: 0-->Normal  11. Extinction and Inattention (formerly Neglect): 0-->No abnormality    Total (NIH Stroke Scale): 2       Results Review:      Labs:  Lumbar puncture results:  White cells 22  Red cells 67  Protein 57.3  Glucose 79  Meningitis/encephalitis panel normal  Lyme Western blot pending  West Nile virus pending  Paraneoplastic panel pending  ACE level pending  Autoimmune encephalitis panel pending  NMDA receptor pending  MS panel pending  Ammonia level normal  Folate level normal  TSH level normal  B12 level normal  B1 level pending    Imaging:  Head CT without contrast reviewed by me showing no acute findings  MRI brain with and without contrast 7/14: With extensive paranasal sinus mucosal thickening, however no acute intracranial abnormality and no contrast-enhancement.  EEG 7/15: With diffuse cerebral dysfunction, but no epileptiform discharges.    Assessment/Plan     Hospital Problem List      Altered mental status    Essential hypertension    Gastroesophageal reflux disease without esophagitis    Mixed hyperlipidemia    Impression:  Altered mentation  Current testing has ruled out an infectious etiology.  No signs of a bacterial nor viral meningitis/encephalitis  Current working diagnoses is an autoimmune encephalitis      Plan:  IV methylprednisolone 1 g daily for 5 days followed by prednisone 60 mg p.o. for 2 weeks follow-up with slow taper of reducing 10 mg every 2 weeks, until reaching dose of 20 mg daily and then further steroid depending on neurology appointment.    Continue following up on lumbar puncture results  Appreciate internal medicine being the primary attending.  Please continue to trend blood glucose levels if we initiate high-dose steroids as these may elevate.  Inpatient neurology service will  continue to follow      Medical Decision Making    Number/Complexity of Problems  Moderate  1 undiagnosed new problem with uncertain prognosis -   1 acute illness with systemic symptoms -   High  1 acute or chronic illness that poses a threat to life/body function -   High    MDM Data  Moderate - 1/3 categories  Extensive - 2/3 categories    Category 1: 3 of the following  Review of external notes  Review of results  Ordering of each unique test  Independent historian  Category 2:  Independent interpretation of test (ex: imaging)  Category 3:  Discussion of management with another provider    Extensive     Treatment Plan  Moderate - Prescription Drug management  High  Drug therapy requiring intensive monitoring for toxicity  Decision regarding hospitalization or escalation of care  De-escalate care/DNR decisions  Drug therapy requiring intensive monitoring (high)      Kishore Key MD  07/17/24  15:40 CDT      Electronically signed by Kishore Key MD at 07/17/24 1543       Leobardo Cerna MD at 07/17/24 1020              [facility] Medicine Services  INPATIENT PROGRESS NOTE    Patient Name: Ty Webb  Date of Admission: 7/13/2024  Today's Date: 07/17/24  Length of Stay: 4  Primary Care Physician: Irineo Cunningham APRN    Subjective   Chief Complaint: Altered mental status.  HPI   Documents resolved patient is back to baseline.  Blood pressure slightly high but stable, afebrile.  Patient back on low-dose of Coreg and lisinopril.    Review of Systems   Constitutional:  Positive for activity change and appetite change. Negative for chills and fever.   HENT:  Negative for hearing loss, nosebleeds, tinnitus and trouble swallowing.    Eyes:  Negative for visual disturbance.   Respiratory:  Negative for cough, chest tightness, shortness of breath and wheezing.    Cardiovascular:  Negative for chest pain, palpitations and leg swelling.   Gastrointestinal:  Negative for  abdominal distention, abdominal pain, blood in stool, constipation, diarrhea, nausea and vomiting.   Endocrine: Negative for cold intolerance, heat intolerance, polydipsia, polyphagia and polyuria.   Genitourinary:  Negative for decreased urine volume, difficulty urinating, dysuria, flank pain, frequency and hematuria.   Musculoskeletal: Gait and dysmotility resolved.    Negative for joint swelling.   Skin:  Negative for rash.   Allergic/Immunologic: Negative for immunocompromised state.   Neurological:  Negative for dizziness, syncope, weakness, light-headedness and headaches.   Hematological:  Negative for adenopathy. Does not bruise/bleed easily.   Psychiatric/Behavioral:  Positive for confusion. Negative for sleep disturbance. The patient is not nervous/anxious.    All pertinent negatives and positives are as above. All other systems have been reviewed and are negative unless otherwise stated.     Objective    Temp:  [97.6 °F (36.4 °C)-98.6 °F (37 °C)] 97.9 °F (36.6 °C)  Heart Rate:  [80-97] 86  Resp:  [16-20] 19  BP: (144-158)/(87-98) 150/98  Physical Exam  Vitals and nursing note reviewed.   HENT:      Head: Normocephalic.   Eyes:      Conjunctiva/sclera: Conjunctivae normal.      Pupils: Pupils are equal, round, and reactive to light.   Cardiovascular:      Rate and Rhythm: Normal rate and regular rhythm.      Heart sounds: Normal heart sounds.   Pulmonary:      Effort: Pulmonary effort is normal. No respiratory distress.      Breath sounds: Normal breath sounds.   Abdominal:      General: Bowel sounds are normal. There is no distension.      Palpations: Abdomen is soft.      Tenderness: There is no abdominal tenderness.   Musculoskeletal:         General: No swelling.      Cervical back: Neck supple.   Skin:     General: Skin is warm and dry.      Capillary Refill: Capillary refill takes 2 to 3 seconds.      Findings: No rash.   Neurological:      Mental Status: He is alert.      Motor: Weakness improving.   Present.      Coordination: Coordination resolved.     Gait: Gait resolved.  Psychiatric:         Mood and Affect: Mood normal.         Behavior: Behavior normal.       Results Review:  I have reviewed the labs, radiology results, and diagnostic studies.    Laboratory Data:   Results from last 7 days   Lab Units 07/17/24  0431 07/16/24  0436 07/15/24  0418   WBC 10*3/mm3 14.40* 8.44 7.43   HEMOGLOBIN g/dL 13.6 15.5 16.0   HEMATOCRIT % 40.7 44.9 47.3   PLATELETS 10*3/mm3 209 206 160        Results from last 7 days   Lab Units 07/17/24  0431 07/16/24  0436 07/15/24  0418 07/13/24  1022   SODIUM mmol/L 138 138 137 135*   POTASSIUM mmol/L 4.6 4.2 4.2 4.2   CHLORIDE mmol/L 104 102 100 98   CO2 mmol/L 24.0 23.0 26.0 26.0   BUN mg/dL 25* 24* 18 14   CREATININE mg/dL 1.03 1.08 0.97 1.04   CALCIUM mg/dL 8.7 8.8 8.6 8.9   BILIRUBIN mg/dL  --  0.5  --  0.6   ALK PHOS U/L  --  60  --  66   ALT (SGPT) U/L  --  49*  --  31   AST (SGOT) U/L  --  25  --  22   GLUCOSE mg/dL 159* 181* 110* 156*       Culture Data:   Blood Culture   Date Value Ref Range Status   07/14/2024 No growth at 2 days  Preliminary   07/14/2024 No growth at 2 days  Preliminary       Radiology Data:   Imaging Results (Last 24 Hours)       ** No results found for the last 24 hours. **            I have reviewed the patient's current medications.     Assessment/Plan   Assessment  Active Hospital Problems    Diagnosis     **Altered mental status     Essential hypertension     Gastroesophageal reflux disease without esophagitis     Mixed hyperlipidemia        Treatment Plan  Altered mental status.  Oriented x 1..  Acute encephalopathy.  Possible autoimmune encephalitis.  IV hydration.  Neurology consult.  . Geodon as needed.  Status post lumbar puncture 7/13/2024.  No evidence infection at this time.  High-dose steroids started by neurology.  MRI of the brain-Extensive paranasal sinus mucosal thickening, No acute intracranial abnormality  identified.  UDS-negative.  EEG-Diffuse cerebral dysfunction of mild degree but nonspecific-  is most commonly seen due to toxic/metabolic cause, No evidence for epilepsy is seen on the study.     Sinusitis/cough.  Rocephin.  Tessalon Perles as needed.  Leukocytosis, probably secondary to steroid.  Chest x-ray-No active disease in the chest.   Patient is on room air.     Hyperlipidemia/hypertension.  Put patient back low-dose of Coreg and lisinopril for now.  Continue Lipitor.  Echocardiogram-ejection fraction 56 to 60%, saline test negative, no obvious valvular abnormality identified in this study.  Family request to DC telemetry and IV fluids and gets around better.     Reflux.  Zofran as needed.     Abdomen pain/multiple nonobstructing renal calculi measuring up to 10 mm/cholelithiasis.  Nontender abdomen.  CT scan of abdomen-  No acute findings in the abdomen,  Multiple nonobstructing left renal calculi measuring up to 10 mm- No  Hydronephrosis, Cholelithiasis without evidence of cholecystitis,  Bibasilar atelectasis.     Hemoglobin A1c 6.5.  Diabetic educator.     Nutrition .  Regular/house diet.     Deconditioning.  PT and OT consult.     Blood cultures-no growth for 2 days.  West Nile is pending.  Varicella negative.  Meningitis panel-negative.  COVID-19-negative.  Flu screen-negative.     Medical Decision Making  Number and Complexity of problems: Altered mental status/sinus/hypertension/hyperlipidemia  Differential Diagnosis: None     Conditions and Status        Condition is unchanged.     [facility] Data  External documents reviewed: Previous note  Cardiac tracing (EKG, telemetry) interpretation: Sinus  Radiology interpretation: MRI/EEG/chest x-ray  Labs reviewed: Laboratory  Any tests that were considered but not ordered: Lab in a.m.     Decision rules/scores evaluated (example [id number], Wells, etc): None     Discussed with: Patient      Care Planning  Shared decision making: Patient  Code status and  discussions: Full code     Disposition  Social Determinants of Health that impact treatment or disposition: From home  Pending neurology    Electronically signed by Leobardo Cerna MD, 07/17/24, 10:20 CDT.    Electronically signed by Leobardo Cerna MD at 07/17/24 1032       Leobardo Cerna MD at 07/16/24 1431              [facility] Medicine Services  INPATIENT PROGRESS NOTE    Patient Name: Ty Webb  Date of Admission: 7/13/2024  Today's Date: 07/16/24  Length of Stay: 3  Primary Care Physician: Irineo Cunningham APRN    Subjective   Chief Complaint: Altered mental status.   HPI   Patient is oriented x 3.  Altered mental status improving.  Tmax 98.9.  T-current 97.8.  Blood pressure stable, afebrile.  White blood cells normal.  Hemoglobin is normal.  CT scan abdomen discussed with family.  Patient denies any abdomen pain.  Family wants something for cough.  Patient is walking the hallway.    Review of Systems   Constitutional:  Positive for activity change and appetite change. Negative for chills and fever.   HENT:  Negative for hearing loss, nosebleeds, tinnitus and trouble swallowing.    Eyes:  Negative for visual disturbance.   Respiratory:  Negative for cough, chest tightness, shortness of breath and wheezing.    Cardiovascular:  Negative for chest pain, palpitations and leg swelling.   Gastrointestinal:  Negative for abdominal distention, abdominal pain, blood in stool, constipation, diarrhea, nausea and vomiting.   Endocrine: Negative for cold intolerance, heat intolerance, polydipsia, polyphagia and polyuria.   Genitourinary:  Negative for decreased urine volume, difficulty urinating, dysuria, flank pain, frequency and hematuria.   Musculoskeletal:  Positive for arthralgias, gait problem and myalgias. Negative for joint swelling.   Skin:  Negative for rash.   Allergic/Immunologic: Negative for immunocompromised state.   Neurological:  Negative for dizziness, syncope,  weakness, light-headedness and headaches.   Hematological:  Negative for adenopathy. Does not bruise/bleed easily.   Psychiatric/Behavioral:  Positive for confusion. Negative for sleep disturbance. The patient is not nervous/anxious.    All pertinent negatives and positives are as above. All other systems have been reviewed and are negative unless otherwise stated.     Objective    Temp:  [97.7 °F (36.5 °C)-98.9 °F (37.2 °C)] 97.8 °F (36.6 °C)  Heart Rate:  [] 95  Resp:  [18-20] 18  BP: (144-157)/(88-98) 144/88  Physical Exam  Vitals and nursing note reviewed.   HENT:      Head: Normocephalic.   Eyes:      Conjunctiva/sclera: Conjunctivae normal.      Pupils: Pupils are equal, round, and reactive to light.   Cardiovascular:      Rate and Rhythm: Normal rate and regular rhythm.      Heart sounds: Normal heart sounds.   Pulmonary:      Effort: Pulmonary effort is normal. No respiratory distress.      Breath sounds: Normal breath sounds.   Abdominal:      General: Bowel sounds are normal. There is no distension.      Palpations: Abdomen is soft.      Tenderness: There is no abdominal tenderness.   Musculoskeletal:         General: No swelling.      Cervical back: Neck supple.   Skin:     General: Skin is warm and dry.      Capillary Refill: Capillary refill takes 2 to 3 seconds.      Findings: No rash.   Neurological:      Mental Status: He is alert.      Motor: Weakness present.      Coordination: Coordination abnormal.      Gait: Gait abnormal.   Psychiatric:         Mood and Affect: Mood normal.         Behavior: Behavior normal.       Results Review:  I have reviewed the labs, radiology results, and diagnostic studies.    Laboratory Data:   Results from last 7 days   Lab Units 07/16/24  0436 07/15/24  0418 07/13/24  1022   WBC 10*3/mm3 8.44 7.43 7.62   HEMOGLOBIN g/dL 15.5 16.0 15.7   HEMATOCRIT % 44.9 47.3 45.5   PLATELETS 10*3/mm3 206 160 160        Results from last 7 days   Lab Units 07/16/24  0436  07/15/24  0418 07/13/24  1022   SODIUM mmol/L 138 137 135*   POTASSIUM mmol/L 4.2 4.2 4.2   CHLORIDE mmol/L 102 100 98   CO2 mmol/L 23.0 26.0 26.0   BUN mg/dL 24* 18 14   CREATININE mg/dL 1.08 0.97 1.04   CALCIUM mg/dL 8.8 8.6 8.9   BILIRUBIN mg/dL 0.5  --  0.6   ALK PHOS U/L 60  --  66   ALT (SGPT) U/L 49*  --  31   AST (SGOT) U/L 25  --  22   GLUCOSE mg/dL 181* 110* 156*       Culture Data:   Blood Culture   Date Value Ref Range Status   07/14/2024 No growth at 2 days  Preliminary   07/14/2024 No growth at 2 days  Preliminary       Radiology Data:   Imaging Results (Last 24 Hours)       Procedure Component Value Units Date/Time    CT Abdomen With Contrast [[id number]] Collected: 07/15/24 1723     Updated: 07/15/24 1731    Narrative:      EXAM/TECHNIQUE: CT abdomen with IV contrast     INDICATION: Abdominal pain, altered mental status     COMPARISON: None available.     DLP: 1177.83 mGy.cm. Automated exposure control was also utilized to  decrease patient radiation dose.     FINDINGS:     Bibasilar atelectasis.     No suspicious liver lesion. Cholelithiasis without evidence of  cholecystitis. No biliary ductal dilatation.     Pancreas and adrenal glands are unremarkable. Spleen is upper limits of  normal in size.     10 mm, 3 mm, and 4 mm left upper pole renal calculi. No hydronephrosis.  No solid renal mass.     Distal esophagus and stomach are unremarkable. Small duodenal  diverticulum. No small bowel distention or evidence of active small  bowel inflammation in the abdomen. Visualized portion of the colon is  unremarkable without evidence of wall thickening or pericolonic fat  stranding.     No ascites. Nonaneurysmal atherosclerotic abdominal aorta. Patent SMA.  No enlarged retroperitoneal or mesenteric abdominal lymph nodes.     No acute abdominal wall soft tissue abnormality. No acute osseous  finding.       Impression:         1.  No acute findings in the abdomen.     2.  Multiple nonobstructing left renal  calculi measuring up to 10 mm. No  hydronephrosis.     3.  Cholelithiasis without evidence of cholecystitis.     4.  Bibasilar atelectasis.     This report was signed and finalized on 7/15/2024 5:27 PM by Dr. Marc Brown MD.               I have reviewed the patient's current medications.     Assessment/Plan   Assessment  Active Hospital Problems    Diagnosis     **Altered mental status     Essential hypertension     Gastroesophageal reflux disease without esophagitis     Mixed hyperlipidemia        Treatment Plan  Altered mental status.  Oriented x 1..  Acute encephalopathy.  Possible autoimmune encephalitis.  IV hydration.  Neurology consult.  . Geodon as needed.  Status post lumbar puncture 7/13/2024.  No evidence infection at this time.  High-dose steroids started by neurology.  MRI of the brain-Extensive paranasal sinus mucosal thickening, No acute intracranial abnormality identified.  UDS-negative.  EEG-Diffuse cerebral dysfunction of mild degree but nonspecific-  is most commonly seen due to toxic/metabolic cause, No evidence for epilepsy is seen on the study.     Sinusitis/cough.  Rocephin.  Tessalon Perles as needed.  Chest x-ray-No active disease in the chest.   Patient is on room air.     Hyperlipidemia/hypertension.  Echocardiogram-ejection fraction 56 to 60%, saline test negative, no obvious valvular abnormality identified in this study.     Reflux.  Zofran as needed.    Abdomen pain/multiple nonobstructing renal calculi measuring up to 10 mm/cholelithiasis.  Nontender abdomen.  CT scan of abdomen-  No acute findings in the abdomen,  Multiple nonobstructing left renal calculi measuring up to 10 mm- No  Hydronephrosis, Cholelithiasis without evidence of cholecystitis,  Bibasilar atelectasis.     Hemoglobin A1c 6.5.  Diabetic educator.     Nutrition . N.p.o. for now due to dysphagia.     Deconditioning.  PT and OT consult.     Blood cultures-no growth for 2 days.  West Nile is pending.  Varicella  negative.  Meningitis panel-negative.  COVID-19-negative.  Flu screen-negative.     Medical Decision Making  Number and Complexity of problems: Altered mental status/sinus/hypertension/hyperlipidemia  Differential Diagnosis: None     Conditions and Status        Condition is unchanged.     [facility] Data  External documents reviewed: Previous note  Cardiac tracing (EKG, telemetry) interpretation: Sinus  Radiology interpretation: MRI/EEG/chest x-ray  Labs reviewed: Laboratory  Any tests that were considered but not ordered: Lab in a.m.     Decision rules/scores evaluated (example [id number], Wells, etc): None     Discussed with: Patient      Care Planning  Shared decision making: Patient  Code status and discussions: Full code     Disposition  Social Determinants of Health that impact treatment or disposition: From home  1 to 3 days    Electronically signed by Leobardo Cerna MD, 07/16/24, 14:31 CDT.    Electronically signed by Leobardo Cerna MD at 07/16/24 1452       Consult Notes (last 48 hours)  Notes from 07/16/24 1223 through 07/18/24 1223   No notes of this type exist for this encounter.

## 2024-07-18 NOTE — PROGRESS NOTES
Neurology Progress Note      Chief Complaint:  Encephalopathy  Length of Stay:  5     Interval     7/18:  Mild improvement from yesterday neurological exam almost close to his baseline.  Received D4 of IV Methylpred 1 g.  Blood cultures from 7/14 no growth to date.  Specialized labs pending.    7/17: Similar improvement in neurological exam as yesterday.  Received D3 of IV Methylpred 1 g.  Blood cultures from 7/14 no growth to date.  Specialized labs pending.    7/16: Significant improvement in neurological exam.  Received D2 of IV Methylpred 1 g.  Blood cultures from 7/14 no growth to date.  Specialized labs pending.  EEG from 715 with diffuse cerebral dysfunction of mild degree.    7/15: Improvement in his mental status as fever improved.  D1 of ceftriaxone for severe sinusitis.  D1 of IV methylprednisolone 1 g daily for 5 days.  Blood cultures from 7/14 no growth to date.  Specialized labs pending.    7/13-14: Lumbar puncture performed, initial labs remarkable for mild pleocytosis and mild elevation of protein.  Meningitis/encephalitis panel PCR negative, West Nile PCR negative, VZV PCR negative, blood cultures drawn.  B12 and folate normal.  TSH normal.  UDS negative.  Specialized lab sent: MS propofol and MBP, NMDA and CSF, encephalopathy panel, ACE, paraneoplastic panel, thiamine, NMO IgG.      Subjective     Subjective:  In very good spirits today, no complaints.    Medications:  Current Facility-Administered Medications   Medication Dose Route Frequency Provider Last Rate Last Admin    acetaminophen (TYLENOL) tablet 650 mg  650 mg Oral Q6H PRN Andrew Corrigan MD        Or    acetaminophen (TYLENOL) suppository 650 mg  650 mg Rectal Q6H PRN Andrew Corrigan MD   650 mg at 07/14/24 1403    aspirin tablet 325 mg  325 mg Oral Daily Andrew Corrigan MD   325 mg at 07/17/24 0956    Or    aspirin suppository 300 mg  300 mg Rectal Daily Andrew Corrigan MD   300 mg at 07/13/24 1701    atorvastatin (LIPITOR)  tablet 80 mg  80 mg Oral Nightly Andrew Corrigan MD   80 mg at 07/17/24 2024    benzonatate (TESSALON) capsule 200 mg  200 mg Oral TID PRN Leobardo Cerna MD   200 mg at 07/18/24 0354    carvedilol (COREG) tablet 6.25 mg  6.25 mg Oral BID With Meals Leobardo Cerna MD   6.25 mg at 07/17/24 1746    cefTRIAXone (ROCEPHIN) 1,000 mg in sodium chloride 0.9 % 100 mL MBP  1,000 mg Intravenous Q24H Leobardo Cerna  mL/hr at 07/17/24 0956 1,000 mg at 07/17/24 0956    lisinopril (PRINIVIL,ZESTRIL) tablet 10 mg  10 mg Oral Daily Leobardo Cerna MD   10 mg at 07/17/24 1140    melatonin tablet 5 mg  5 mg Oral Nightly PRN Kishore Key MD   5 mg at 07/17/24 2225    methylPREDNISolone sodium succinate (SOLU-Medrol) 1,000 mg in sodium chloride 0.9 % 100 mL IVPB  1,000 mg Intravenous Daily Kishore Key  mL/hr at 07/17/24 0956 1,000 mg at 07/17/24 0956    Followed by    [START ON 7/20/2024] predniSONE (DELTASONE) tablet 60 mg  60 mg Oral Daily With Breakfast Kishore Key MD        Followed by    [START ON 8/3/2024] predniSONE (DELTASONE) tablet 50 mg  50 mg Oral Daily With Breakfast Kishore Key MD        Followed by    [START ON 8/17/2024] predniSONE (DELTASONE) tablet 40 mg  40 mg Oral Daily With Breakfast Kishore Key MD        Followed by    [START ON 8/31/2024] predniSONE (DELTASONE) tablet 30 mg  30 mg Oral Daily With Breakfast Kishore Key MD        Followed by    [START ON 9/14/2024] predniSONE (DELTASONE) tablet 20 mg  20 mg Oral Daily With Breakfast Kishore Key MD        ondansetron (ZOFRAN) injection 4 mg  4 mg Intravenous Q6H PRN Leobardo Cerna MD        pantoprazole (PROTONIX) EC tablet 40 mg  40 mg Oral BID With Meals Leobardo Cerna MD   40 mg at 07/17/24 1746    sodium chloride 0.9 % flush 10 mL  10 mL Intravenous Q12H Andrew Corrigan MD   10 mL at 07/17/24 2024    sodium chloride 0.9 % flush 10 mL  10 mL Intravenous PRN Andrew Corrigan,  MD        sodium chloride 0.9 % infusion 40 mL  40 mL Intravenous PRN Andrew Corrigan MD        ziprasidone (GEODON) injection 10 mg  10 mg Intramuscular Q6H PRN Andrew Corrigan MD   10 mg at 07/15/24 1226             Objective      Vital Signs  Temp:  [97.6 °F (36.4 °C)-98.1 °F (36.7 °C)] 97.9 °F (36.6 °C)  Heart Rate:  [63-84] 69  Resp:  [16-18] 18  BP: (132-150)/(71-91) 135/91    Physical Exam:    No evidence of neck stiffness  No evidence of photophobia.  The lights are on bright room and the patient does not show any concern in regards to this.  No signs that the patient is in pain nor has a headache    Pertinent Neuro Exam:  Awake.  With reduced verbal output however able to form sentences that are complex.  Follows simple and complex commands, is able to repeat complex sentences, no word finding difficulty even with low-frequency words.  Able to estimate 7 quarters in $1.75, able to say the days of the week backwards without hesitation, some difficulty naming the months of the year backwards with skipping 2 months although faster than yesterday.  Mild difficulty with serial sevens.  Unable to perform any Z fingers.  Cranial nerves II to XII intact  Moving all extremities with great strength  No pathologic reflexes  No signs of gross ataxia      Last nurse assessment:  Interval:  (handoff)  1a. Level of Consciousness: 0-->Alert, keenly responsive  1b. LOC Questions: 0-->Answers both questions correctly  1c. LOC Commands: 0-->Performs both tasks correctly  2. Best Gaze: 0-->Normal  3. Visual: 0-->No visual loss  4. Facial Palsy: 0-->Normal symmetrical movements  5a. Motor Arm, Left: 0-->No drift, limb holds 90 (or 45) degrees for full 10 secs  5b. Motor Arm, Right: 0-->No drift, limb holds 90 (or 45) degrees for full 10 secs  6a. Motor Leg, Left: 0-->No drift, leg holds 30 degree position for full 5 secs  6b. Motor Leg, Right: 0-->No drift, leg holds 30 degree position for full 5 secs  7. Limb Ataxia:  1-->Present in one limb  8. Sensory: 0-->Normal, no sensory loss  9. Best Language: 1-->Mild-to-moderate aphasia, some obvious loss of fluency or facility of comprehension, without significant limitation on ideas expressed or form of expression. Reduction of speech and/or comprehension, however, makes conversation. . . (see row details)  10. Dysarthria: 0-->Normal  11. Extinction and Inattention (formerly Neglect): 0-->No abnormality    Total (NIH Stroke Scale): 2       Results Review:      Labs:  Lumbar puncture results:  White cells 22  Red cells 67  Protein 57.3  Glucose 79  Meningitis/encephalitis panel normal  [place] Western blot pending  West Nile virus pending  Paraneoplastic panel pending  ACE level pending  Autoimmune encephalitis panel pending  NMDA receptor pending  MS panel pending  Ammonia level normal  Folate level normal  TSH level normal  B12 level normal  B1 level pending    Imaging:  Head CT without contrast reviewed by me showing no acute findings  MRI brain with and without contrast 7/14: With extensive paranasal sinus mucosal thickening, however no acute intracranial abnormality and no contrast-enhancement.  EEG 7/15: With diffuse cerebral dysfunction, but no epileptiform discharges.    Assessment/Plan     Hospital Problem List      Altered mental status    Essential hypertension    Gastroesophageal reflux disease without esophagitis    Mixed hyperlipidemia    Impression:  Altered mentation  Current testing has ruled out an infectious etiology.  No signs of a bacterial nor viral meningitis/encephalitis  Current working diagnoses is an autoimmune encephalitis      Plan:  IV methylprednisolone 1 g daily for 5 days followed by prednisone 60 mg p.o. for 2 weeks follow-up with slow taper of reducing 10 mg every 2 weeks, until reaching dose of 20 mg daily and then further steroid depending on neurology appointment.    For outpatient will require increase in his gastric protection, vitamin D,  prophylactic Bactrim for PJP.  Continue following up on lumbar puncture results  Appreciate internal medicine being the primary attending.  Please continue to trend blood glucose levels if we initiate high-dose steroids as these may elevate.  Inpatient neurology service will continue to follow      Medical Decision Making    Number/Complexity of Problems  Moderate  1 undiagnosed new problem with uncertain prognosis -   1 acute illness with systemic symptoms -   High  1 acute or chronic illness that poses a threat to life/body function -   High    MDM Data  Moderate - 1/3 categories  Extensive - 2/3 categories    Category 1: 3 of the following  Review of external notes  Review of results  Ordering of each unique test  Independent historian  Category 2:  Independent interpretation of test (ex: imaging)  Category 3:  Discussion of management with another provider    Extensive     Treatment Plan  Moderate - Prescription Drug management  High  Drug therapy requiring intensive monitoring for toxicity  Decision regarding hospitalization or escalation of care  De-escalate care/DNR decisions  Drug therapy requiring intensive monitoring (high)      Kishore Key MD  07/18/24  09:26 CDT

## 2024-07-18 NOTE — PAYOR COMM NOTE
"Joey Argueta (58 y.o. Male)   [id number]   [phone]  [phone]       Date of Birth   1965    Social Security Number       Address   [address] [place] [address]    Home Phone   [phone]    MRN   [id number]       [place]   Religious    Marital Status                               Admission Date   7/13/24    Admission Type   Emergency    Admitting Provider   Leobardo Cerna MD    Attending Provider   Leobardo Cerna MD    Department, Room/Bed   [facility] 3A, 353/1       Discharge Date       Discharge Disposition       Discharge Destination                                 Attending Provider: Leobardo Cerna MD    Allergies: No Known Allergies    Isolation: None   Infection: None   Code Status: CPR    Ht: 203.2 cm (80\")   Wt: 122 kg (270 lb)    Admission Cmt: None   Principal Problem: Altered mental status [R41.82]                   Active Insurance as of 7/13/2024       Primary Coverage       Payor Plan Insurance Group Employer/Plan Group    [employer] BLUE SHI[employer] PPO [id number]       Payor Plan Address Payor Plan Phone Number Payor Plan Fax Number Effective Dates    [address] [phone]  1/1/2019 - None Entered    [place] [address]         Subscriber Name Subscriber Birth Date Member ID       JOEY ARGUETA 1965 [id number]                     Emergency Contacts        (Rel.) Home Phone Work Phone Mobile Phone    Jennie Argueta (Spouse) [phone] -- [phone]              Vital Signs (last 3 days)       Date/Time Temp Temp src Pulse Resp BP Patient Position SpO2    07/18/24 1058 97.9 (36.6) Oral 71 18 154/88 Lying 97    07/18/24 0745 97.9 (36.6) Oral 69 18 135/91 Lying 94    07/18/24 0354 97.7 (36.5) Oral 63 16 138/83 Lying 92    07/18/24 0016 97.6 (36.4) Oral 67 18 132/71 Lying 93    07/17/24 2001 98.1 (36.7) Oral 79 18 150/81 Lying 92    07/17/24 1614 97.6 (36.4) Oral " 84 18 143/88 Lying 91    07/17/24 1050 97.9 (36.6) Oral 78 16 148/85 Lying 92    07/17/24 0748 97.9 (36.6) Oral 86 19 150/98 Lying 93    07/17/24 0434 97.6 (36.4) Oral 80 16 148/91 Lying 91    07/17/24 0029 97.8 (36.6) Oral 90 18 154/87 Lying 93    07/16/24 1952 98.6 (37) Oral 97 18 158/98 Lying 94    07/16/24 1506 97.9 (36.6) Oral 96 20 149/95 Lying 93    07/16/24 1041 97.8 (36.6) Oral 95 18 144/88 Lying 92    07/16/24 0800 97.9 (36.6) Oral 94 18 151/89 Lying 93    07/16/24 0334 97.7 (36.5) Oral 93 18 147/94 Lying 93    07/16/24 0155 98.1 (36.7) Oral 103 20 152/98 Lying 96    07/15/24 2350 98.9 (37.2) Axillary 103 19 150/92 Lying 95    07/15/24 1952 98.2 (36.8) Axillary 92 18 157/92 Lying 94    07/15/24 1530 --  -- -- -- -- -- --    Temp: patient off unit at 07/15/24 1530    07/15/24 1139 -- Axillary 89 20 151/96 Lying 95    07/15/24 0814 98.3 (36.8) Axillary 89 20 146/61 Lying 91    07/15/24 0307 98.1 (36.7) Axillary 89 18 147/93 Lying 94    07/15/24 0025 98.2 (36.8) Axillary 89 18 150/98 Lying 92          Medication Administration Report for Ty Webb as of 7/16/24 through 7/18/24     Legend:    Given Hold Not Given Due Canceled Entry Other Actions    Time Time (Time) Time Time-Action         Discontinued     Completed     Future     MAR Hold     Linked             Medications 07/16/24 07/17/24 07/18/24     Discontinued Medications   sodium chloride 0.9 % infusion  Rate: 100 mL/hr Dose: 100 mL/hr  Freq: Continuous Route: IV  Start: 07/14/24 1415 End: 07/17/24 1028       1000-New Bag     1141-Stopped

## 2024-07-18 NOTE — THERAPY DISCHARGE NOTE
Acute Care - Occupational Therapy Discharge Summary  [facility]     Patient Name: Ty Webb  : 1965  MRN: [id number]    Today's Date: 2024       Date of Referral to OT: 24         Admit Date: 2024        OT Recommendation and Plan    Visit Dx:    ICD-10-CM ICD-9-CM   1. Altered mental status, unspecified altered mental status type  R41.82 780.97   2. Dysphagia, unspecified type  R13.10 787.20   3. Impaired mobility [Z74.09]  Z74.09 799.89                OT Rehab Goals       Row Name 24 1200             Bathing Goal 1 (OT)    Activity/Device (Bathing Goal 1, OT) upper body bathing;lower body bathing  -EC      [place] Level/Cues Needed (Bathing Goal 1, OT) independent  -EC      Time Frame (Bathing Goal 1, OT) long term goal (LTG)  -EC      Progress/Outcomes (Bathing Goal 1, OT) goal met  per pt and family report  -EC         Toileting Goal 1 (OT)    Activity/Device (Toileting Goal 1, OT) adjust/manage clothing;perform perineal hygiene  -EC      [place] Level/Cues Needed (Toileting Goal 1, OT) independent  -EC      Time Frame (Toileting Goal 1, OT) long term goal (LTG)  -EC      Progress/Outcome (Toileting Goal 1, OT) goal met  per pt and spouse report  -EC         Problem Specific Goal 1 (OT)    Problem Specific Goal 1 (OT) Pt will sequence ADL task with less than 2 vcs for increased I with fxnal activities.  -EC      Time Frame (Problem Specific Goal 1, OT) long term goal (LTG)  -EC      Progress/Outcome (Problem Specific Goal 1, OT) goal met  -EC                User Key  (r) = Recorded By, (t) = Taken By, (c) = Cosigned By      Initials Name Provider Type Discipline    EC Mary Lyon, OTR/L Occupational Therapist OT                     Outcome Measures       Row Name 24 1500 24 1300          How much help from another person do you currently need...    Turning from your back to your side while in flat bed without using bedrails? 4  -KJ --     Moving from  lying on back to sitting on the side of a flat bed without bedrails? 4  -KJ --     Moving to and from a bed to a chair (including a wheelchair)? 4  -KJ --     Standing up from a chair using your arms (e.g., wheelchair, bedside chair)? 4  -KJ --     Climbing 3-5 steps with a railing? 3  -KJ --     To walk in hospital room? 4  -KJ --     AM-PAC 6 Clicks Score (PT) 23  -KJ --     Highest Level of Mobility Goal 7 --> Walk 25 feet or more  -KJ --        How much help from another is currently needed...    Putting on and taking off regular lower body clothing? -- 4  -EC     Bathing (including washing, rinsing, and drying) -- 3  -EC     Toileting (which includes using toilet bed pan or urinal) -- 4  -EC     Putting on and taking off regular upper body clothing -- 4  -EC     Taking care of personal grooming (such as brushing teeth) -- 4  -EC     Eating meals -- 4  -EC     AM-PAC 6 Clicks Score (OT) -- 23  -EC        Functional Assessment    Outcome Measure Options AM-PAC 6 Clicks Basic Mobility (PT)  -KJ AM-PAC 6 Clicks Daily Activity (OT)  -EC               User Key  (r) = Recorded By, (t) = Taken By, (c) = Cosigned By      Initials Name Provider Type    Nakita Interiano, PTA Physical Therapist Assistant    Mary Wilson OTR/L Occupational Therapist                    Timed Therapy Charges  Total Units: 2      Suggested Charges  Total Units: 2      Procedure Name Documented Minutes Units Code    HC OT THERAPEUTIC ACT EA 15 MIN 30 2   50847 (CPT®)                 Documented Minutes  Total Minutes: 30      Therapy Provided Minutes    87119 - OT Therapeutic Activity Minutes 30                        OT Discharge Summary  Anticipated Discharge Disposition (OT): home with assist  Reason for Discharge: At baseline function  Outcomes Achieved: Refer to plan of care for updates on goals achieved  Discharge Destination: Home with assist      CEE Jackson  7/18/2024

## 2024-07-19 ENCOUNTER — TELEPHONE (OUTPATIENT)
Dept: FAMILY MEDICINE CLINIC | Facility: CLINIC | Age: 59
End: 2024-07-19
Payer: COMMERCIAL

## 2024-07-19 ENCOUNTER — READMISSION MANAGEMENT (OUTPATIENT)
Dept: CALL CENTER | Facility: HOSPITAL | Age: 59
End: 2024-07-19
Payer: COMMERCIAL

## 2024-07-19 VITALS
RESPIRATION RATE: 18 BRPM | WEIGHT: 270 LBS | HEIGHT: 78 IN | TEMPERATURE: 97.6 F | BODY MASS INDEX: 31.24 KG/M2 | OXYGEN SATURATION: 92 % | DIASTOLIC BLOOD PRESSURE: 87 MMHG | HEART RATE: 61 BPM | SYSTOLIC BLOOD PRESSURE: 144 MMHG

## 2024-07-19 LAB
ALB CSF/SERPL: 10 {RATIO} (ref 0–8)
ALBUMIN CSF-MCNC: 45 MG/DL (ref 15–55)
ALBUMIN SERPL-MCNC: 4.3 G/DL (ref 3.8–4.9)
ANION GAP SERPL CALCULATED.3IONS-SCNC: 11 MMOL/L (ref 5–15)
B BURGDOR IGG PATRN CSF IB-IMP: NEGATIVE
B BURGDOR IGM PATRN CSF IB-IMP: NEGATIVE
B BURGDOR18KD IGG CSF QL IB: NORMAL
B BURGDOR23KD IGG CSF QL IB: NORMAL
B BURGDOR23KD IGM CSF QL IB: NORMAL
B BURGDOR28KD IGG CSF QL IB: NORMAL
B BURGDOR30KD IGG CSF QL IB: NORMAL
B BURGDOR39KD IGG CSF QL IB: NORMAL
B BURGDOR39KD IGM CSF QL IB: NORMAL
B BURGDOR41KD IGG CSF QL IB: NORMAL
B BURGDOR41KD IGM CSF QL IB: NORMAL
B BURGDOR45KD IGG CSF QL IB: NORMAL
B BURGDOR58KD IGG CSF QL IB: NORMAL
B BURGDOR66KD IGG CSF QL IB: NORMAL
B BURGDOR93KD IGG CSF QL IB: NORMAL
BACTERIA SPEC AEROBE CULT: NORMAL
BACTERIA SPEC AEROBE CULT: NORMAL
BUN SERPL-MCNC: 24 MG/DL (ref 8–23)
BUN/CREAT SERPL: 21.8 (ref 7–25)
CALCIUM SPEC-SCNC: 8.7 MG/DL (ref 8.6–10.5)
CHLORIDE SERPL-SCNC: 101 MMOL/L (ref 98–107)
CO2 SERPL-SCNC: 26 MMOL/L (ref 22–29)
CREAT SERPL-MCNC: 1.1 MG/DL (ref 0.76–1.27)
DEPRECATED RDW RBC AUTO: 40.9 FL (ref 37–54)
EGFRCR SERPLBLD CKD-EPI 2021: 76.9 ML/MIN/1.73
ERYTHROCYTE [DISTWIDTH] IN BLOOD BY AUTOMATED COUNT: 13 % (ref 12.3–15.4)
GLUCOSE SERPL-MCNC: 232 MG/DL (ref 65–99)
HCT VFR BLD AUTO: 41.3 % (ref 37.5–51)
HGB BLD-MCNC: 14.1 G/DL (ref 13–17.7)
IGG CSF-MCNC: 3.7 MG/DL (ref 0–10.3)
IGG SERPL-MCNC: 880 MG/DL (ref 603–1613)
IGG SYNTH RATE SER+CSF CALC-MRATE: -5 MG/DAY
IGG/ALB CLEAR SER+CSF-RTO: 0.4 (ref 0–0.7)
IGG/ALB CSF: 0.08 {RATIO} (ref 0–0.25)
MBP CSF-MCNC: 3.5 NG/ML (ref 0–4.7)
MCH RBC QN AUTO: 29.6 PG (ref 26.6–33)
MCHC RBC AUTO-ENTMCNC: 34.1 G/DL (ref 31.5–35.7)
MCV RBC AUTO: 86.6 FL (ref 79–97)
OLIGOCLONAL BANDS.IT SER+CSF QL: ABNORMAL
PLATELET # BLD AUTO: 181 10*3/MM3 (ref 140–450)
PMV BLD AUTO: 10.7 FL (ref 6–12)
POTASSIUM SERPL-SCNC: 4.1 MMOL/L (ref 3.5–5.2)
RBC # BLD AUTO: 4.77 10*6/MM3 (ref 4.14–5.8)
SODIUM SERPL-SCNC: 138 MMOL/L (ref 136–145)
WBC NRBC COR # BLD AUTO: 9.66 10*3/MM3 (ref 3.4–10.8)

## 2024-07-19 PROCEDURE — 85027 COMPLETE CBC AUTOMATED: CPT | Performed by: FAMILY MEDICINE

## 2024-07-19 PROCEDURE — 80048 BASIC METABOLIC PNL TOTAL CA: CPT | Performed by: FAMILY MEDICINE

## 2024-07-19 NOTE — OUTREACH NOTE
Prep Survey      Flowsheet Row Responses   [facility] patient discharged from? [facility]   Is LACE score < 7 ? No   Eligibility [facility]   Date of Admission 07/13/24   Date of Discharge 07/19/24   Discharge Disposition Home or Self Care   Discharge diagnosis Altered mental status   Does the patient have one of the following disease processes/diagnoses(primary or secondary)? Other   Prep survey completed? Yes            Nakita FLORES - Registered Nurse

## 2024-07-19 NOTE — PLAN OF CARE
Problem: Adult Inpatient Plan of Care  Goal: Plan of Care Review  Outcome: Ongoing, Progressing  Flowsheets (Taken 7/19/2024 0325)  Progress: no change  Plan of Care Reviewed With: patient  Outcome Evaluation: Pt is A&Ox4. No complaints of any pain. voiding. VSS. safety maintained.

## 2024-07-19 NOTE — TELEPHONE ENCOUNTER
Attempted to call pt to schedule a hospital f/U. Pt was not reached. Left vm to call office back to schedule. -hub to relay

## 2024-07-20 ENCOUNTER — TRANSITIONAL CARE MANAGEMENT TELEPHONE ENCOUNTER (OUTPATIENT)
Dept: CALL CENTER | Facility: HOSPITAL | Age: 59
End: 2024-07-20
Payer: COMMERCIAL

## 2024-07-20 NOTE — PAYOR COMM NOTE
"DC HOME 7-19-24    Joey Argueta (58 y.o. Male)       Date of Birth   1965    Social Security Number       Address   [address] [place] [address]    Home Phone   [phone]    MRN   [id number]       [facility]    Marital Status                               Admission Date   7/13/24    Admission Type   Emergency    Admitting Provider   Leobardo Cerna MD    Attending Provider       Department, Room/Bed   [facility] 3A, 353/1       Discharge Date   7/19/2024    Discharge Disposition   Home or Self Care    Discharge Destination                                 Attending Provider: (none)   Allergies: No Known Allergies    Isolation: None   Infection: None   Code Status: Prior    Ht: 203.2 cm (80\")   Wt: 122 kg (270 lb)    Admission Cmt: None   Principal Problem: Altered mental status [R41.82]                   Active Insurance as of 7/13/2024       Primary Coverage       Payor Plan Insurance Group Employer/Plan Group    [employer] BLUE [employer] PPO [id number]       Payor Plan Address Payor Plan Phone Number Payor Plan Fax Number Effective Dates    [address] [phone]  1/1/2019 - None Entered    [facility] [id number]         Subscriber Name Subscriber Birth Date Member ID       JOEY ARGUETA 1965 [id number]                     Emergency Contacts        (Rel.) Home Phone Work Phone Mobile Phone    Jennie Argueta (Spouse) [phone] -- [phone]              Discharge Summary    No notes of this type exist for this encounter.       "

## 2024-07-20 NOTE — OUTREACH NOTE
Call Center TCM Note      Flowsheet Row Responses   [facility] patient discharged from? [place]   Does the patient have one of the following disease processes/diagnoses(primary or secondary)? Other   TCM attempt successful? Yes   Call start time 1116   Call end time 1132   Is patient permission given to speak with other caregiver? Yes   Person spoke with today (if not patient) and relationship Rayette-spouse.   Meds reviewed with patient/caregiver? Yes   Is the patient having any side effects they believe may be caused by any medication additions or changes? No   Does the patient have all medications ordered at discharge? Yes   Is the patient taking all medications as directed (includes completed medication regime)? Yes   Comments PCP hospital f/u appt 07/26/24. States will make 2 week appt with neurology.   Does the patient have an appointment with their PCP within 7-14 days of discharge? Yes   Has home health visited the patient within 72 hours of discharge? N/A   Home health comments Outpatient PT.   Psychosocial issues? No   Did the patient receive a copy of their discharge instructions? Yes   Nursing interventions Reviewed instructions with patient   What is the patient's perception of their health status since discharge? Improving   Is the patient/caregiver able to teach back signs and symptoms related to disease process for when to call PCP? Yes   Is the patient/caregiver able to teach back signs and symptoms related to disease process for when to call 911? Yes   Is the patient/caregiver able to teach back the hierarchy of who to call/visit for symptoms/problems? PCP, Specialist, Home health nurse, Urgent Care, ED, 911 Yes   If the patient is a current smoker, are they able to teach back resources for cessation? Not a smoker   Additional teach back comments Encouraged to monitor BP daily and keep record for appts. Reviewed BEFAST.   TCM call completed? Yes   Wrap up additional comments Patient's spouse  [facility] patient is improving. [facility] patient had some difficulty sleeping last night. [facility] will discuss with PCP if continues with insomnia. Denies any other needs or concerns today. TCM complete.   Call end time 1132   Would this patient benefit from a Referral to [facility] Social Work? No   Is the patient interested in additional calls from an ambulatory ? No            Zoey Montiel RN    7/20/2024, 11:34 CDT

## 2024-07-21 NOTE — THERAPY DISCHARGE NOTE
Acute Care - Physical Therapy Discharge Summary  [facility]       Patient Name: Ty Webb  : 1965  MRN: [id number]    Today's Date: 2024                 Admit Date: 2024      PT Recommendation and Plan    Visit Dx:    ICD-10-CM ICD-9-CM   1. Altered mental status, unspecified altered mental status type  R41.82 780.97   2. Dysphagia, unspecified type  R13.10 787.20   3. Impaired mobility [Z74.09]  Z74.09 799.89                PT Rehab Goals       Row Name 24 1400             Bed Mobility Goal 1 (PT)    Activity/Assistive Device (Bed Mobility Goal 1, PT) sit to supine;supine to sit;rolling to left;rolling to right  -AB      [place] Level/Cues Needed (Bed Mobility Goal 1, PT) contact guard required  -AB      Time Frame (Bed Mobility Goal 1, PT) long term goal (LTG)  -AB      Progress/Outcomes (Bed Mobility Goal 1, PT) goal met  -AB         Transfer Goal 1 (PT)    Activity/Assistive Device (Transfer Goal 1, PT) sit-to-stand/stand-to-sit;bed-to-chair/chair-to-bed  -AB      [place] Level/Cues Needed (Transfer Goal 1, PT) minimum assist (75% or more patient effort)  -AB      Time Frame (Transfer Goal 1, PT) long term goal (LTG)  -AB      Progress/Outcome (Transfer Goal 1, PT) goal met  -AB         Gait Training Goal 1 (PT)    Activity/Assistive Device (Gait Training Goal 1, PT) gait (walking locomotion);decrease fall risk;diminish gait deviation;improve balance and speed  -AB      [place] Level (Gait Training Goal 1, PT) minimum assist (75% or more patient effort)  -AB      Distance (Gait Training Goal 1, PT) 20  -AB      Time Frame (Gait Training Goal 1, PT) long term goal (LTG)  -AB      Progress/Outcome (Gait Training Goal 1, PT) goal met  -AB                User Key  (r) = Recorded By, (t) = Taken By, (c) = Cosigned By      Initials Name Provider Type Discipline    AB Eve Steward, PTA Physical Therapist Assistant PT                        PT Discharge Summary  Anticipated  Discharge Disposition (PT): inpatient rehabilitation facility  Reason for Discharge: Discharge from facility  Outcomes Achieved: Refer to plan of care for updates on goals achieved  Discharge Destination: Home with assist      Eve Steward, PTA   7/21/2024

## 2024-07-23 LAB
QT INTERVAL: 390 MS
QTC INTERVAL: 458 MS

## 2024-07-30 ENCOUNTER — OFFICE VISIT (OUTPATIENT)
Dept: FAMILY MEDICINE CLINIC | Facility: CLINIC | Age: 59
End: 2024-07-30
Payer: COMMERCIAL

## 2024-07-30 VITALS
DIASTOLIC BLOOD PRESSURE: 80 MMHG | TEMPERATURE: 96.8 F | BODY MASS INDEX: 29.27 KG/M2 | OXYGEN SATURATION: 98 % | WEIGHT: 253 LBS | RESPIRATION RATE: 18 BRPM | HEIGHT: 78 IN | HEART RATE: 89 BPM | SYSTOLIC BLOOD PRESSURE: 104 MMHG

## 2024-07-30 DIAGNOSIS — R41.82 ALTERED MENTAL STATUS, UNSPECIFIED ALTERED MENTAL STATUS TYPE: ICD-10-CM

## 2024-07-30 DIAGNOSIS — Z12.11 SCREENING FOR COLON CANCER: ICD-10-CM

## 2024-07-30 DIAGNOSIS — K80.20 CALCULUS OF GALLBLADDER WITHOUT CHOLECYSTITIS WITHOUT OBSTRUCTION: ICD-10-CM

## 2024-07-30 DIAGNOSIS — K21.9 GASTROESOPHAGEAL REFLUX DISEASE WITHOUT ESOPHAGITIS: ICD-10-CM

## 2024-07-30 DIAGNOSIS — N20.0 RENAL CALCULI: ICD-10-CM

## 2024-07-30 DIAGNOSIS — I10 ESSENTIAL HYPERTENSION: Primary | ICD-10-CM

## 2024-07-30 PROCEDURE — 99214 OFFICE O/P EST MOD 30 MIN: CPT | Performed by: NURSE PRACTITIONER

## 2024-07-30 RX ORDER — ASPIRIN 325 MG
1 TABLET, DELAYED RELEASE (ENTERIC COATED) ORAL DAILY
COMMUNITY
Start: 2024-07-19

## 2024-07-30 RX ORDER — LOSARTAN POTASSIUM 50 MG/1
1 TABLET ORAL DAILY
COMMUNITY
Start: 2024-07-19

## 2024-07-30 RX ORDER — CETIRIZINE HYDROCHLORIDE 10 MG/1
1 TABLET ORAL DAILY
COMMUNITY

## 2024-07-30 RX ORDER — PREDNISONE 20 MG/1
TABLET ORAL
COMMUNITY
Start: 2024-07-19

## 2024-07-30 NOTE — PROGRESS NOTES
"Subjective   Chief Complaint:  Reevaluation after hospitalization    History of Present Illness:  This 58 y.o. male was seen in the office today.  He was admitted to [facility] 7/13/2024 and subsequently discharged 7/21/2024.  He had a mental status change, respiratory infection at home which failed to get better with home conservative supportive care.  He reports he did not know anything for the first few days of his hospital stay until he got medication.  His meningitis workup was evidently negative.  He reports he was told he could have had sepsis.  The discharge summary is not in yet to be able to review.  We do note a slightly low blood pressure today, history of high blood pressure, on meds-reports feels good with current regimen.  He was found to have cholelithiasis-asymptomatic, also renal calculi nonobstructing-reports no pain or has caused any problems.    Past Medical, Surgical, Social, and Family History:  No Known Allergies   Past Medical History:   Diagnosis Date    GERD (gastroesophageal reflux disease)     Hyperlipidemia     Hypertension       Past Surgical History:   Procedure Laterality Date    HERNIA REPAIR        Social History     Socioeconomic History    Marital status:    Tobacco Use    Smoking status: Never    Smokeless tobacco: Never   Vaping Use    Vaping status: Never Used   Substance and Sexual Activity    Alcohol use: No    Drug use: Never      Family History   Problem Relation Age of Onset    Breast cancer Mother     Bone cancer Mother     Breast cancer Sister        Objective   Vital Signs  /80 (BP Location: Left arm, Patient Position: Sitting, Cuff Size: Large Adult)   Pulse 89   Temp 96.8 °F (36 °C) (Infrared)   Resp 18   Ht 203.2 cm (80\")   Wt 115 kg (253 lb)   SpO2 98%   BMI 27.79 kg/m²    Physical Exam  Constitutional:       General: He is not in acute distress.  Neck:      Vascular: No carotid bruit.   Cardiovascular:      Rate and Rhythm: Normal " rate and regular rhythm.      Pulses: Normal pulses.           Dorsalis pedis pulses are 2+ on the right side and 2+ on the left side.        Posterior tibial pulses are 2+ on the right side and 2+ on the left side.      Heart sounds: No murmur heard.     No friction rub. No gallop.   Pulmonary:      Effort: Pulmonary effort is normal. No respiratory distress.      Breath sounds: Normal breath sounds. No wheezing or rhonchi.   Musculoskeletal:      Right lower leg: No edema.      Left lower leg: No edema.   Neurological:      Mental Status: He is alert.     Assessment & Plan   Diagnoses and all orders for this visit:    1. Essential hypertension (Primary)  Comments:  Chronic/stable-continue Coreg and losartan    2. Altered mental status, unspecified altered mental status type  Comments:  Resolved    3. BMI 27.0-27.9,adult  Comments:  Informational AVS teaching sheet    4. Screening for colon cancer  -     Cologuard - Stool, Per Rectum; Future    5. Gastroesophageal reflux disease without esophagitis  Comments:  Chronic/stable-continue PPI    Plan:  Advised and educated plan of care.      Follow-up:  The patient will Return in about 6 months (around 1/30/2025) for follow-Up.    Records and Results Reviewed:  I reviewed current medications as given by patient and allergy list  CT Abdomen With Contrast (07/15/2024 15:45)  MRI Brain With & Without Contrast (07/14/2024 12:58)  XR Chest 1 View (07/13/2024 10:58)  CT Angiogram Neck (07/13/2024 10:49)  CT Angiogram Head w AI Analysis of LVO (07/13/2024 10:49)  CT Head Without Contrast (07/13/2024 10:48)  BMI is >= 25 and <30. (Overweight) The following options were offered after discussion;: Information on healthy weight added to patient's after visit summary.    Electronically signed by JOSÉ LUIS Ngo, 07/30/24, 12:25 PM CDT.

## 2024-08-22 DIAGNOSIS — R19.5 POSITIVE COLORECTAL CANCER SCREENING USING COLOGUARD TEST: Primary | ICD-10-CM

## 2024-09-18 ENCOUNTER — OFFICE VISIT (OUTPATIENT)
Dept: FAMILY MEDICINE CLINIC | Facility: CLINIC | Age: 59
End: 2024-09-18
Payer: COMMERCIAL

## 2024-09-18 VITALS
WEIGHT: 261 LBS | OXYGEN SATURATION: 96 % | BODY MASS INDEX: 30.2 KG/M2 | DIASTOLIC BLOOD PRESSURE: 70 MMHG | HEIGHT: 78 IN | SYSTOLIC BLOOD PRESSURE: 100 MMHG | TEMPERATURE: 97.5 F | HEART RATE: 98 BPM

## 2024-09-18 DIAGNOSIS — E78.2 MIXED HYPERLIPIDEMIA: ICD-10-CM

## 2024-09-18 DIAGNOSIS — I10 ESSENTIAL HYPERTENSION: Primary | ICD-10-CM

## 2024-09-18 PROCEDURE — 99214 OFFICE O/P EST MOD 30 MIN: CPT | Performed by: NURSE PRACTITIONER

## 2024-09-18 RX ORDER — LOSARTAN POTASSIUM 50 MG/1
50 TABLET ORAL DAILY
Qty: 90 TABLET | Refills: 3 | Status: SHIPPED | OUTPATIENT
Start: 2024-09-18

## 2024-09-18 RX ORDER — PRAVASTATIN SODIUM 10 MG
10 TABLET ORAL NIGHTLY
Qty: 90 TABLET | Refills: 1 | Status: SHIPPED | OUTPATIENT
Start: 2024-09-18

## 2024-10-17 RX ORDER — CARVEDILOL 25 MG/1
25 TABLET ORAL 2 TIMES DAILY WITH MEALS
Qty: 60 TABLET | Refills: 5 | Status: SHIPPED | OUTPATIENT
Start: 2024-10-17

## 2024-10-17 NOTE — TELEPHONE ENCOUNTER
Rx Refill Note  Requested Prescriptions     Pending Prescriptions Disp Refills    carvedilol (COREG) 25 MG tablet       Sig: Take 1 tablet by mouth 2 (Two) Times a Day With Meals.      Last office visit with office: 09/18/2024  Next office visit with office: 01/30/2025    UDS:     DATE OF LAST REFILL: 07/13/2024    Controlled Substance Agreement:     SWATHI OR SRIRAM:          {TIP  Is Refill Pharmacy correct?:  Naz Bradshaw MA  10/17/24, 10:58 CDT

## 2024-10-18 ENCOUNTER — TELEPHONE (OUTPATIENT)
Dept: FAMILY MEDICINE CLINIC | Facility: CLINIC | Age: 59
End: 2024-10-18
Payer: COMMERCIAL

## 2024-10-21 ENCOUNTER — OFFICE VISIT (OUTPATIENT)
Dept: GASTROENTEROLOGY | Facility: CLINIC | Age: 59
End: 2024-10-21
Payer: COMMERCIAL

## 2024-10-21 VITALS
BODY MASS INDEX: 31.61 KG/M2 | DIASTOLIC BLOOD PRESSURE: 80 MMHG | HEIGHT: 78 IN | OXYGEN SATURATION: 95 % | TEMPERATURE: 98.2 F | WEIGHT: 273.2 LBS | HEART RATE: 94 BPM | SYSTOLIC BLOOD PRESSURE: 134 MMHG

## 2024-10-21 DIAGNOSIS — R19.5 POSITIVE COLORECTAL CANCER SCREENING USING COLOGUARD TEST: Primary | ICD-10-CM

## 2024-10-21 DIAGNOSIS — Z80.0 FAMILY HX OF COLON CANCER: ICD-10-CM

## 2024-10-21 PROCEDURE — 99214 OFFICE O/P EST MOD 30 MIN: CPT | Performed by: NURSE PRACTITIONER

## 2024-10-21 NOTE — PROGRESS NOTES
Chief Complaint   Patient presents with    GI Problem     Colonoscopy        PCP: Irineo Cunningham APRN  REFER: Irineo Cunningham APRN    Subjective     HPI    He presents to office with positive cologaurd test from PCP office in 8/2024.  Coarse is constant.  Length of time test has been positive is unknown.  Testing performed as part of routine physical.  He denies change in bowels.  Bowels described as moving without difficulty, no change. No abdominal pain.  No BRBPR.  No melena.  Weight is stable.         Colonoscopy, Scan (03/17/2015 00:00) recall 10 years.   Maternal gm had colon cancer.           Past Medical History:   Diagnosis Date    GERD (gastroesophageal reflux disease)     Hyperlipidemia     Hypertension        Past Surgical History:   Procedure Laterality Date    COLONOSCOPY  03/17/2015    normal    HERNIA REPAIR         Outpatient Medications Marked as Taking for the 10/21/24 encounter (Office Visit) with Josseline Higgins APRN   Medication Sig Dispense Refill    aspirin 325 MG EC tablet Take 1 tablet by mouth Daily.      carvedilol (COREG) 25 MG tablet Take 1 tablet by mouth 2 (Two) Times a Day With Meals. 60 tablet 5    esomeprazole (nexIUM) 40 MG capsule Take 1 capsule by mouth 2 (Two) Times a Day With Meals.      losartan (COZAAR) 50 MG tablet Take 1 tablet by mouth Daily. 90 tablet 3       No Known Allergies    Social History     Socioeconomic History    Marital status:    Tobacco Use    Smoking status: Never    Smokeless tobacco: Never   Vaping Use    Vaping status: Never Used   Substance and Sexual Activity    Alcohol use: No    Drug use: Never    Sexual activity: Defer       Family History   Problem Relation Age of Onset    Breast cancer Mother     Bone cancer Mother     Breast cancer Sister     Colon cancer Maternal Grandmother        Review of Systems   Constitutional:  Negative for chills, fever and unexpected weight change.   Respiratory:  Negative for shortness of breath.   "  Cardiovascular:  Negative for chest pain.   Gastrointestinal:  Negative for abdominal distention, abdominal pain, anal bleeding, blood in stool, constipation, diarrhea, nausea and vomiting.       Objective     Vitals:    10/21/24 1251   BP: 134/80   Pulse: 94   Temp: 98.2 °F (36.8 °C)   SpO2: 95%   Weight: 124 kg (273 lb 3.2 oz)   Height: 200.7 cm (79\")     Body mass index is 30.78 kg/m².    Physical Exam  Vitals reviewed.   Constitutional:       General: He is not in acute distress.  Cardiovascular:      Rate and Rhythm: Normal rate and regular rhythm.      Heart sounds: Normal heart sounds.   Pulmonary:      Effort: Pulmonary effort is normal.      Breath sounds: Normal breath sounds.   Abdominal:      General: Bowel sounds are normal. There is no distension.      Palpations: Abdomen is soft.      Tenderness: There is no abdominal tenderness.   Skin:     General: Skin is warm and dry.   Neurological:      Mental Status: He is alert.         Imaging Results (Most Recent)       None            Body mass index is 30.78 kg/m².    Assessment & Plan     Diagnoses and all orders for this visit:    1. Positive colorectal cancer screening using Cologuard test (Primary)  -     Case Request; Standing  -     Case Request    2. Family hx of colon cancer    Other orders  -     Implement Anesthesia Orders Day of Procedure; Standing        COLONOSCOPY WITH ANESTHESIA (N/A)  Switch to asa 81 mg 1 week prior to procedure.     I have explained a positive cologuard indicates the presence of DNA/hgb in stool that is associated with colon polyps or colon cancer.  There is a chance the test is a false positive with that chance being higher for people over the age of 65. This is due to normal changes in DNA associated with getting older (AGA). There is still a chance there is still a colon cancer causing the positive result. Due to the positive result of the Cologuard I recommend pt undergoing colonoscopy.  Colonoscopy remains the gold " standard for detection of colon polyps/colon cancer.      All risks, benefits, alternatives, and indications of colonoscopy procedure have been discussed with the patient. Risks to include perforation of the colon requiring possible surgery or colostomy, risk of bleeding from biopsies or removal of colon tissue, possibility of missing a colon polyp or cancer, or adverse drug reaction.  Benefits to include the diagnosis and management of disease of the colon and rectum.  Pt verbalizes understanding and agrees to proceed with procedure.             There are no Patient Instructions on file for this visit.

## 2024-10-22 PROBLEM — R19.5 POSITIVE COLORECTAL CANCER SCREENING USING COLOGUARD TEST: Status: ACTIVE | Noted: 2024-10-21

## 2024-12-02 ENCOUNTER — ANESTHESIA (OUTPATIENT)
Dept: GASTROENTEROLOGY | Facility: HOSPITAL | Age: 59
End: 2024-12-02
Payer: COMMERCIAL

## 2024-12-02 ENCOUNTER — ANESTHESIA EVENT (OUTPATIENT)
Dept: GASTROENTEROLOGY | Facility: HOSPITAL | Age: 59
End: 2024-12-02
Payer: COMMERCIAL

## 2024-12-02 ENCOUNTER — HOSPITAL ENCOUNTER (OUTPATIENT)
Facility: HOSPITAL | Age: 59
Setting detail: HOSPITAL OUTPATIENT SURGERY
Discharge: HOME | End: 2024-12-02
Attending: INTERNAL MEDICINE | Admitting: INTERNAL MEDICINE
Payer: COMMERCIAL

## 2024-12-02 ENCOUNTER — TELEPHONE (OUTPATIENT)
Dept: GASTROENTEROLOGY | Facility: CLINIC | Age: 59
End: 2024-12-02
Payer: COMMERCIAL

## 2024-12-02 VITALS
RESPIRATION RATE: 18 BRPM | WEIGHT: 265 LBS | OXYGEN SATURATION: 96 % | TEMPERATURE: 97.2 F | SYSTOLIC BLOOD PRESSURE: 87 MMHG | HEIGHT: 78 IN | DIASTOLIC BLOOD PRESSURE: 83 MMHG | BODY MASS INDEX: 30.66 KG/M2 | HEART RATE: 81 BPM

## 2024-12-02 PROCEDURE — 45378 DIAGNOSTIC COLONOSCOPY: CPT | Performed by: INTERNAL MEDICINE

## 2024-12-02 PROCEDURE — 25010000002 LIDOCAINE PF 2% 2 % SOLUTION: Performed by: NURSE ANESTHETIST, CERTIFIED REGISTERED

## 2024-12-02 PROCEDURE — 25010000002 PROPOFOL 10 MG/ML EMULSION: Performed by: NURSE ANESTHETIST, CERTIFIED REGISTERED

## 2024-12-02 RX ORDER — ONDANSETRON 2 MG/ML
4 INJECTION INTRAMUSCULAR; INTRAVENOUS ONCE AS NEEDED
Status: DISCONTINUED | OUTPATIENT
Start: 2024-12-02 | End: 2024-12-02 | Stop reason: HOSPADM

## 2024-12-02 RX ORDER — PROPOFOL 10 MG/ML
VIAL (ML) INTRAVENOUS AS NEEDED
Status: DISCONTINUED | OUTPATIENT
Start: 2024-12-02 | End: 2024-12-02 | Stop reason: SURG

## 2024-12-02 RX ORDER — LIDOCAINE HYDROCHLORIDE 20 MG/ML
INJECTION, SOLUTION EPIDURAL; INFILTRATION; INTRACAUDAL; PERINEURAL AS NEEDED
Status: DISCONTINUED | OUTPATIENT
Start: 2024-12-02 | End: 2024-12-02 | Stop reason: SURG

## 2024-12-02 RX ORDER — SODIUM CHLORIDE 0.9 % (FLUSH) 0.9 %
10 SYRINGE (ML) INJECTION AS NEEDED
Status: DISCONTINUED | OUTPATIENT
Start: 2024-12-02 | End: 2024-12-02 | Stop reason: HOSPADM

## 2024-12-02 RX ADMIN — PROPOFOL 25 MG: 10 INJECTION, EMULSION INTRAVENOUS at 12:27

## 2024-12-02 RX ADMIN — PROPOFOL 50 MG: 10 INJECTION, EMULSION INTRAVENOUS at 12:31

## 2024-12-02 RX ADMIN — PROPOFOL 25 MG: 10 INJECTION, EMULSION INTRAVENOUS at 12:26

## 2024-12-02 RX ADMIN — PROPOFOL 100 MG: 10 INJECTION, EMULSION INTRAVENOUS at 12:24

## 2024-12-02 RX ADMIN — LIDOCAINE HYDROCHLORIDE 100 MG: 20 INJECTION, SOLUTION EPIDURAL; INFILTRATION; INTRACAUDAL; PERINEURAL at 12:24

## 2024-12-02 RX ADMIN — PROPOFOL 50 MG: 10 INJECTION, EMULSION INTRAVENOUS at 12:33

## 2024-12-02 RX ADMIN — Medication 10 ML: at 10:57

## 2024-12-02 RX ADMIN — PROPOFOL 50 MG: 10 INJECTION, EMULSION INTRAVENOUS at 12:37

## 2024-12-02 RX ADMIN — PROPOFOL 50 MG: 10 INJECTION, EMULSION INTRAVENOUS at 12:25

## 2024-12-02 RX ADMIN — PROPOFOL 50 MG: 10 INJECTION, EMULSION INTRAVENOUS at 12:29

## 2024-12-02 NOTE — ANESTHESIA POSTPROCEDURE EVALUATION
Patient: Ty Webb    Procedure Summary       Date: 12/02/24 Room / Location:  [facility] /  [facility]    Anesthesia Start: 1219 Anesthesia Stop: 1244    Procedure: COLONOSCOPY WITH ANESTHESIA Diagnosis:       Positive colorectal cancer screening using Cologuard test      (Positive colorectal cancer screening using Cologuard test [R19.5])    Surgeons: Evelio Richard MD Provider: Cheikh Graham CRNA    Anesthesia Type: MAC ASA Status: 2            Anesthesia Type: MAC    Vitals  No vitals data found for the desired time range.          Post Anesthesia Care and Evaluation    Patient location during evaluation: PHASE II  Patient participation: complete - patient participated  Level of consciousness: awake and sleepy but conscious  Pain score: 0  Pain management: adequate    Airway patency: patent  Anesthetic complications: No anesthetic complications    Cardiovascular status: acceptable  Respiratory status: acceptable  Hydration status: acceptable

## 2024-12-02 NOTE — H&P
[facility] Gastroenterology  Pre Procedure History & Physical    Chief Complaint:   Positive Cologuard    Subjective     HPI:   He had a Cologuard in August that was positive.  He presents for colonoscopy exam.  Denies any symptoms.    Past Medical History:   Past Medical History:   Diagnosis Date    GERD (gastroesophageal reflux disease)     Hyperlipidemia     Hypertension        Past Surgical History:  Past Surgical History:   Procedure Laterality Date    COLONOSCOPY  03/17/2015    normal    HERNIA REPAIR          Family History:  Family History   Problem Relation Age of Onset    Breast cancer Mother     Bone cancer Mother     Breast cancer Sister     Colon cancer Maternal Grandmother        Social History:   reports that he has never smoked. He has never used smokeless tobacco. He reports that he does not drink alcohol and does not use drugs.    Medications:   Prior to Admission medications    Medication Sig Start Date End Date Taking? Authorizing Provider   aspirin 325 MG EC tablet Take 1 tablet by mouth Daily. 7/19/24  Yes Catina Azevedo MD   carvedilol (COREG) 25 MG tablet Take 1 tablet by mouth 2 (Two) Times a Day With Meals. 10/17/24  Yes Irineo Cunningham APRN   esomeprazole (nexIUM) 40 MG capsule Take 1 capsule by mouth 2 (Two) Times a Day With Meals. 7/17/24  Yes Catina Azevedo MD   losartan (COZAAR) 50 MG tablet Take 1 tablet by mouth Daily. 9/18/24  Yes Irineo Cunningham APRN   pravastatin (PRAVACHOL) 10 MG tablet Take 1 tablet by mouth Every Night.  Patient not taking: Reported on 10/21/2024 9/18/24   Irineo Cunningham APRN   predniSONE (DELTASONE) 20 MG tablet Please see attached for detailed directions 7/19/24 12/2/24  ProviderCatina MD       Allergies:  Patient has no known allergies.    ROS:    General: Weight stable  Respiratory: No SOA  Cardiovascular: No CP    Objective     Blood pressure 140/83, pulse 81, temperature 97.2 °F (36.2 °C), temperature source Temporal, resp. rate  "18, height 200.7 cm (79\"), weight 120 kg (265 lb), SpO2 96%.    Physical Exam   Constitutional: Pt is oriented to person, place, and in no distress.   Cardiovascular: Normal rate, regular rhythm.    Pulmonary/Chest: Effort normal. No respiratory distress.    Abdominal: Nondistended.  Psychiatric: Mood, memory, affect and judgment appear normal.     Assessment & Plan     Diagnosis:  Positive Cologuard    Anticipated Surgical Procedure:  Colonoscopy    The risks, benefits, and alternatives of this procedure have been discussed with the patient or the responsible party- the patient understands and agrees to proceed.    EMR Dragon/transcription disclaimer:  Much of this encounter note is electronic transcription/translation of spoken language to printed text.  The electronic translation of spoken language may be erroneous, or at times, nonsensical words or phrases may be inadvertently transcribed.  Although I have reviewed the note for such errors, some may still exist.  "

## 2024-12-12 ENCOUNTER — TELEPHONE (OUTPATIENT)
Dept: FAMILY MEDICINE CLINIC | Facility: CLINIC | Age: 59
End: 2024-12-12
Payer: COMMERCIAL

## 2024-12-12 NOTE — TELEPHONE ENCOUNTER
He should be on losartan 50 mg p.o. daily and should stay off the lisinopril.    Electronically signed by JOSÉ LUIS Ngo, 12/12/24, 2:27 PM CST.

## 2024-12-12 NOTE — TELEPHONE ENCOUNTER
RECEIVED A REFILL REQUEST FROM THE PHARMACY FOR LISINOPRIL 10 MG TABLET - LAST FILL DATE 08/24/2024 - NOT ACTIVE ON MED LIST    LAST OV: 09/18/2024  NEXT OV: 01/30/2025    PLEASE ADVISE

## 2024-12-31 ENCOUNTER — APPOINTMENT (OUTPATIENT)
Dept: GENERAL RADIOLOGY | Facility: HOSPITAL | Age: 59
End: 2024-12-31
Payer: OTHER MISCELLANEOUS

## 2024-12-31 ENCOUNTER — APPOINTMENT (OUTPATIENT)
Dept: CT IMAGING | Facility: HOSPITAL | Age: 59
End: 2024-12-31
Payer: OTHER MISCELLANEOUS

## 2024-12-31 ENCOUNTER — HOSPITAL ENCOUNTER (EMERGENCY)
Facility: HOSPITAL | Age: 59
Discharge: HOME | End: 2024-12-31
Attending: GENERAL PRACTICE
Payer: OTHER MISCELLANEOUS

## 2024-12-31 VITALS
DIASTOLIC BLOOD PRESSURE: 111 MMHG | WEIGHT: 272 LBS | RESPIRATION RATE: 20 BRPM | SYSTOLIC BLOOD PRESSURE: 165 MMHG | BODY MASS INDEX: 31.47 KG/M2 | OXYGEN SATURATION: 96 % | HEIGHT: 78 IN | HEART RATE: 74 BPM | TEMPERATURE: 98.2 F

## 2024-12-31 DIAGNOSIS — M25.511 ACUTE PAIN OF RIGHT SHOULDER: ICD-10-CM

## 2024-12-31 DIAGNOSIS — S01.81XA LACERATION OF FOREHEAD, INITIAL ENCOUNTER: ICD-10-CM
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
## 2024-12-31 PROCEDURE — 99284 EMERGENCY DEPT VISIT MOD MDM: CPT

## 2024-12-31 PROCEDURE — 90471 IMMUNIZATION ADMIN: CPT | Performed by: GENERAL PRACTICE

## 2024-12-31 PROCEDURE — 25010000002 LIDOCAINE 1 % SOLUTION: Performed by: GENERAL PRACTICE

## 2024-12-31 PROCEDURE — 70450 CT HEAD/BRAIN W/O DYE: CPT

## 2024-12-31 PROCEDURE — 71046 X-RAY EXAM CHEST 2 VIEWS: CPT

## 2024-12-31 PROCEDURE — 73030 X-RAY EXAM OF SHOULDER: CPT

## 2024-12-31 PROCEDURE — 25010000002 TETANUS-DIPHTH-ACELL PERTUSSIS 5-2.5-18.5 LF-MCG/0.5 SUSPENSION PREFILLED SYRINGE: Performed by: GENERAL PRACTICE

## 2024-12-31 PROCEDURE — 90715 TDAP VACCINE 7 YRS/> IM: CPT | Performed by: GENERAL PRACTICE

## 2024-12-31 RX ORDER — ACETAMINOPHEN 500 MG
1000 TABLET ORAL ONCE
Status: COMPLETED | OUTPATIENT
Start: 2024-12-31 | End: 2024-12-31

## 2024-12-31 RX ORDER — LIDOCAINE HYDROCHLORIDE 10 MG/ML
10 INJECTION, SOLUTION INFILTRATION; PERINEURAL ONCE
Status: COMPLETED | OUTPATIENT
Start: 2024-12-31 | End: 2024-12-31

## 2024-12-31 RX ADMIN — ACETAMINOPHEN 1000 MG: 500 TABLET, FILM COATED ORAL at 22:20

## 2024-12-31 RX ADMIN — TETANUS TOXOID, REDUCED DIPHTHERIA TOXOID AND ACELLULAR PERTUSSIS VACCINE, ADSORBED 0.5 ML: 5; 2.5; 8; 8; 2.5 SUSPENSION INTRAMUSCULAR at 22:21

## 2024-12-31 RX ADMIN — LIDOCAINE HYDROCHLORIDE 10 ML: 10 INJECTION, SOLUTION INFILTRATION; PERINEURAL at 23:00

## 2024-12-31 NOTE — Clinical Note
[facility] EMERGENCY DEPARTMENT  [address] [place] [address] AVE  [facility] [address]  Phone: [phone]    Ty Webb was seen and treated in our emergency department on 12/31/2024.  He may return to work on 01/03/2025.         Thank you for choosing [facility].    Julio Marley MD

## 2025-01-01 NOTE — ED PROVIDER NOTES
Subjective   History of Present Illness  59-year-old male, with past medical history including GERD, hyperlipidemia, hypertension presents after a fall.  Patient states that he was at work, walking in the dark when he stepped on a rock slipped, and fell.  Patient states he was unable to catch himself.  He reports landing on his right shoulder, right chest, and hitting the side of his head.  He notes laceration to right forehead.  And some persistent shoulder and chest wall discomfort.  Denies loss of consciousness.  Patient denies other injuries.  Was ambulatory on scene.  Denies neck pain or stiffness.        Review of Systems   HENT:  Negative for dental problem, facial swelling and sinus pain.    Eyes:  Negative for pain and visual disturbance.   Respiratory:  Negative for shortness of breath.    Cardiovascular:         R chest wall pain/injury   Gastrointestinal:  Negative for abdominal pain and vomiting.   Genitourinary:  Negative for flank pain.   Musculoskeletal:  Negative for arthralgias, back pain, gait problem and neck pain.   Skin:  Positive for wound.   Neurological:  Negative for dizziness, weakness and headaches.   Psychiatric/Behavioral:  Negative for agitation and behavioral problems.        Past Medical History:   Diagnosis Date    GERD (gastroesophageal reflux disease)     Hyperlipidemia     Hypertension        No Known Allergies    Past Surgical History:   Procedure Laterality Date    COLONOSCOPY  03/17/2015    normal    COLONOSCOPY N/A 12/2/2024    Procedure: COLONOSCOPY WITH ANESTHESIA;  Surgeon: Evelio Richard MD;  Location: [facility] ENDOSCOPY;  Service: Gastroenterology;  Laterality: N/A;  pre op: screening  post op: hemorrhoids  pcp: Irineo Cunningham APRN    HERNIA REPAIR         Family History   Problem Relation Age of Onset    Breast cancer Mother     Bone cancer Mother     Breast cancer Sister     Colon cancer Maternal Grandmother        Social History     Socioeconomic History    Marital  status:    Tobacco Use    Smoking status: Never    Smokeless tobacco: Never   Vaping Use    Vaping status: Never Used   Substance and Sexual Activity    Alcohol use: No    Drug use: Never    Sexual activity: Defer           Objective   Physical Exam  Vitals and nursing note reviewed.   Constitutional:       Appearance: Normal appearance.   HENT:      Head: Normocephalic.      Comments: 4 cm laceration noted to right forehead without persistent bleeding.     Right Ear: External ear normal.      Left Ear: External ear normal.      Nose: Nose normal.      Mouth/Throat:      Mouth: Mucous membranes are moist.      Pharynx: Oropharynx is clear.   Eyes:      Extraocular Movements: Extraocular movements intact.      Conjunctiva/sclera: Conjunctivae normal.      Pupils: Pupils are equal, round, and reactive to light.   Cardiovascular:      Rate and Rhythm: Normal rate and regular rhythm.      Pulses: Normal pulses.      Heart sounds: Normal heart sounds.   Pulmonary:      Effort: Pulmonary effort is normal.      Breath sounds: Normal breath sounds.   Chest:      Chest wall: Tenderness present.   Abdominal:      General: Abdomen is flat.      Tenderness: There is no abdominal tenderness.   Musculoskeletal:         General: No swelling. Normal range of motion.      Cervical back: Normal range of motion and neck supple. No rigidity or tenderness.      Comments: Right shoulder tenderness to palpation noted with full range of motion   Skin:     General: Skin is warm and dry.      Capillary Refill: Capillary refill takes less than 2 seconds.   Neurological:      General: No focal deficit present.      Mental Status: He is alert and oriented to person, place, and time.   Psychiatric:         Mood and Affect: Mood normal.         Behavior: Behavior normal.         Laceration Repair    Date/Time: 12/31/2024 11:26 PM    Performed by: Julio Marley MD  Authorized by: Julio Marley MD    Consent:     Consent obtained:   Verbal    Consent given by:  Patient    Risks discussed:  Infection, pain and need for additional repair    Alternatives discussed:  No treatment  Universal protocol:     Procedure explained and questions answered to patient or proxy's satisfaction: yes      Relevant documents present and verified: yes      Test results available: yes      Imaging studies available: yes      Required blood products, implants, devices, and special equipment available: yes      Site/side marked: yes      Immediately prior to procedure, a time out was called: yes      Patient identity confirmed:  Verbally with patient and arm band  Anesthesia:     Anesthesia method:  Local infiltration    Local anesthetic:  Lidocaine 1% w/o epi  Laceration details:     Location:  Face    Face location:  Forehead    Length (cm):  4    Depth (mm):  6  Pre-procedure details:     Preparation:  Patient was prepped and draped in usual sterile fashion and imaging obtained to evaluate for foreign bodies  Exploration:     Limited defect created (wound extended): no      Imaging outcome: foreign body not noted      Wound exploration: wound explored through full range of motion and entire depth of wound visualized      Wound extent: no fascia violation noted, no foreign bodies/material noted and no muscle damage noted      Contaminated: no    Treatment:     Area cleansed with:  Valeria-Mary Ann    Amount of cleaning:  Standard    Irrigation solution:  Sterile water    Debridement:  None    Undermining:  None    Scar revision: no    Skin repair:     Repair method:  Sutures    Suture size:  4-0    Suture material:  Nylon    Number of sutures:  6  Approximation:     Approximation:  Close  Repair type:     Repair type:  Simple  Post-procedure details:     Dressing:  Non-adherent dressing    Procedure completion:  Tolerated well, no immediate complications             ED Course  ED Course as of 01/01/25 0500   Tue Dec 31, 2024   9866 CT head reviewed, notable for scalp  hematoma without evidence of intracranial hemorrhage. [PC]   2300 Shoulder x-ray reviewed, without evidence of fracture or dislocation. [PC]   2300 Chest x-ray reviewed, without evidence of displaced rib fracture, pulmonary contusion, or other acute abnormality. [PC]   2332 Laceration repair completed without evidence of complication.  Patient tolerated procedure well.  Shoulder pain improved after Tylenol.  Patient to follow-up with primary care physician in 10 days for suture removal and reevaluation.  Return precautions given. [PC]      ED Course User Index  [PC] Julio Marley MD                                                       Medical Decision Making  Differential includes but is not limited to intracranial hemorrhage, skull fracture, laceration, right shoulder fracture, rib fractures.  No symptoms to suggest syncope given reported history.    Problems Addressed:  Acute pain of right shoulder: complicated acute illness or injury  Fall, initial encounter: complicated acute illness or injury  Laceration of forehead, initial encounter: complicated acute illness or injury    Amount and/or Complexity of Data Reviewed  Radiology: ordered.    Risk  OTC drugs.  Prescription drug management.        Final diagnoses:   Fall, initial encounter   Laceration of forehead, initial encounter   Acute pain of right shoulder       ED Disposition  ED Disposition       ED Disposition   Discharge    Condition   Stable    Comment   --               Irineo Cunningham, APRN  [address] [address]  [phone]    Call in 1 day  For suture removal in 10 days    [facility] EMERGENCY DEPARTMENT  [address] [place] [id number] [phone]    As needed, If symptoms worsen         Medication List      No changes were made to your prescriptions during this visit.            Julio Marley MD  01/01/25 0309

## 2025-01-01 NOTE — DISCHARGE INSTRUCTIONS
Please follow-up with your primary care physician in 10 days for suture removal.  Return to the ER if you have new redness, swelling, or other new symptoms.

## 2025-01-29 ENCOUNTER — OFFICE VISIT (OUTPATIENT)
Dept: FAMILY MEDICINE CLINIC | Facility: CLINIC | Age: 60
End: 2025-01-29
Payer: OTHER MISCELLANEOUS

## 2025-01-29 VITALS
BODY MASS INDEX: 31.01 KG/M2 | OXYGEN SATURATION: 95 % | HEART RATE: 74 BPM | HEIGHT: 78 IN | DIASTOLIC BLOOD PRESSURE: 70 MMHG | SYSTOLIC BLOOD PRESSURE: 126 MMHG | WEIGHT: 268 LBS

## 2025-01-29 DIAGNOSIS — M25.511 ACUTE PAIN OF RIGHT SHOULDER: Primary | ICD-10-CM

## 2025-01-29 NOTE — PROGRESS NOTES
"Chief Complaint  Work Comp (Fell at work, right shoulder pain.)    Subjective      Ty Webb presents to [facility]  History of Present Illness  The patient is a 59-year-old male who presents for evaluation of right shoulder pain.    He experienced a fall at his workplace on New Year's Day, approximately one month ago, resulting in injuries to his right shoulder and head. The initial impact rendered him unable to move his shoulder for the first 4 to 5 days. Although there has been gradual improvement, he continues to experience sharp, ice pick-like pain in the anterior aspect of his shoulder during certain movements such as raising or reaching out. Radiographic imaging conducted on the day of the incident revealed no fractures or dislocations. He has not engaged in any physical therapy or exercises since the incident. He reports audible popping and cracking sounds when attempting to stretch his shoulder. He has been managing the pain with Aleve and ibuprofen, taken intermittently. He also reports difficulty sleeping on his right side, a position he typically prefers, but notes some improvement over the past week. He reports no numbness, tingling, decreased sensation, muscle loss, or strength loss in the right upper extremity.    MEDICATIONS  Aleve, ibuprofen      Objective   Vital Signs:  /70   Pulse 74   Ht 200.7 cm (79\")   Wt 122 kg (268 lb)   SpO2 95%   BMI 30.19 kg/m²   Estimated body mass index is 30.19 kg/m² as calculated from the following:    Height as of this encounter: 200.7 cm (79\").    Weight as of this encounter: 122 kg (268 lb).            Physical Exam     Physical Exam  Right shoulder was examined.      Result Review :  The following labs/imaging/notes were reviewed and discussed with the patient by Evelio Trammell MD on 01/29/2025:  Narrative & Impression   EXAM: XR SHOULDER 2+ VW RIGHT-      INDICATION: fall, R shoulder and R rib pain      COMPARISON: " None.     TECHNIQUE: 3 views of the right shoulder was obtained.     FINDINGS:     No acute fracture or malalignment. Right glenohumeral joint is  preserved. Moderate AC joint arthrosis.     IMPRESSION:     No acute osseous finding.     Moderate right AC joint arthrosis.        This report was signed and finalized on 1/1/2025 7:33 AM by Michael Bai.             Assessment      Diagnoses and all orders for this visit:    1. Acute pain of right shoulder (Primary)  -     Ambulatory Referral to Physical Therapy for Evaluation & Treatment             Assessment & Plan  1. Right shoulder pain.  The patient reports persistent pain in the right shoulder following a fall at work approximately one month ago. Initial x-rays showed no fractures or dislocations. The pain is described as a sharp, ice-pick-like sensation when raising or reaching out. There is no numbness, tingling, decreased sensation, muscle loss, or strength loss in the right side. The patient has been taking Aleve and ibuprofen intermittently for pain relief. Physical therapy will be initiated to improve mobility, flexibility, and strength. The patient is advised to take Aleve (naproxen) or ibuprofen with food, especially before physical therapy sessions, to enhance the effectiveness of the therapy by reducing pain and discomfort. If there is no improvement after 4 to 6 weeks of physical therapy, an MRI will be considered, and a referral to an orthopedic specialist will be made to determine if surgical intervention is necessary.    Orders Placed This Encounter   Procedures   • Ambulatory Referral to Physical Therapy for Evaluation & Treatment     Referral Priority:   Routine     Referral Type:   Physical Therapy     Referral Reason:   Patient Preference     Requested Specialty:   Physical Therapy     Number of Visits Requested:   1       Follow Up   Return in about 7 weeks (around 3/19/2025) for Workmans comp, R shoulder pain post PT.  Patient was given  instructions and counseling regarding his condition or for health maintenance advice. Please see specific information pulled into the AVS if appropriate.         Patient or patient representative verbalized consent for the use of Ambient Listening during the visit with  Evelio Trammell MD for chart documentation. 1/29/2025  12:26 CST

## 2025-02-11 ENCOUNTER — TELEPHONE (OUTPATIENT)
Dept: FAMILY MEDICINE CLINIC | Facility: CLINIC | Age: 60
End: 2025-02-11

## 2025-02-11 DIAGNOSIS — M25.511 ACUTE PAIN OF RIGHT SHOULDER: Primary | ICD-10-CM

## 2025-02-11 NOTE — TELEPHONE ENCOUNTER
Caller: [facility]    Relationship: Other    Best call back number: [phone]    What orders are you requesting (i.e. lab or imaging): ORDER TO BE SWITCHED FROM OT INSTEAD OF PT     Additional notes: NURSE REQUESTING THIS ORDER BE CHANGED SO PATIENT WILL BE SEEN SOONER.

## 2025-03-22 DIAGNOSIS — I10 ESSENTIAL HYPERTENSION: ICD-10-CM

## 2025-03-24 RX ORDER — CARVEDILOL 25 MG/1
25 TABLET ORAL 2 TIMES DAILY WITH MEALS
Qty: 60 TABLET | Refills: 5 | Status: SHIPPED | OUTPATIENT
Start: 2025-03-24

## 2025-03-24 RX ORDER — CARVEDILOL 25 MG/1
25 TABLET ORAL 2 TIMES DAILY WITH MEALS
Qty: 180 TABLET | Refills: 1 | OUTPATIENT
Start: 2025-03-24

## 2025-03-24 RX ORDER — LOSARTAN POTASSIUM 50 MG/1
50 TABLET ORAL DAILY
Qty: 90 TABLET | Refills: 3 | Status: SHIPPED | OUTPATIENT
Start: 2025-03-24

## 2025-05-06 ENCOUNTER — OFFICE VISIT (OUTPATIENT)
Dept: FAMILY MEDICINE CLINIC | Facility: CLINIC | Age: 60
End: 2025-05-06
Payer: COMMERCIAL

## 2025-05-06 VITALS
HEIGHT: 78 IN | HEART RATE: 81 BPM | OXYGEN SATURATION: 95 % | BODY MASS INDEX: 30.78 KG/M2 | DIASTOLIC BLOOD PRESSURE: 78 MMHG | WEIGHT: 266 LBS | SYSTOLIC BLOOD PRESSURE: 126 MMHG

## 2025-05-06 DIAGNOSIS — M25.511 CHRONIC RIGHT SHOULDER PAIN: Primary | ICD-10-CM

## 2025-05-06 DIAGNOSIS — G89.29 CHRONIC RIGHT SHOULDER PAIN: Primary | ICD-10-CM

## 2025-05-06 PROCEDURE — 99213 OFFICE O/P EST LOW 20 MIN: CPT

## 2025-05-06 NOTE — PROGRESS NOTES
"Chief Complaint  Shoulder Injury (Follow up on shoulder pain from fall at work. Therapy was completed 2 weeks ago.  Pt is hoping now he can get more scans MRI.)    Subjective      Ty Webb presents to [facility]  History of Present Illness  The patient is a 59-year-old male who presents for follow-up of right shoulder pain.    He reports persistent right shoulder pain, which he describes as a sensation akin to an ice pick lodged in the anterior aspect of his shoulder. This pain significantly impedes his ability to elevate his arm. Despite undergoing physical therapy, he has not experienced any noticeable improvement. He has been utilizing resistance bands for exercises but had to switch to the lightest ones due to his inability to perform the exercises with the heavier bands. He has undergone radiographic imaging of his ribs and shoulder. His physical therapy regimen included ultrasound, heating pad application, stretching, and isometric exercises. During his final visit, a TENS unit was used, which initially seemed to slightly improve his mobility, but overall, he did not observe any significant improvement. He attended six physical therapy sessions, scheduled once weekly. He also reports a burning sensation in his shoulder when reaching or grabbing, which resolves quickly and does not persist into the following day. He experiences discomfort during sleep, particularly when lying on his right side, which causes his arm to fall asleep. This issue is somewhat alleviated when he props his arm up with a couple of pillows while lying on his left side.      Objective   Vital Signs:  /78   Pulse 81   Ht 198.1 cm (78\")   Wt 121 kg (266 lb)   SpO2 95%   BMI 30.74 kg/m²   Estimated body mass index is 30.74 kg/m² as calculated from the following:    Height as of this encounter: 198.1 cm (78\").    Weight as of this encounter: 121 kg (266 lb).            Physical Exam "     Physical Exam  Musculoskeletal: Decreased range of motion and pain in the right shoulder, especially with reaching up or out. Weakness noted in the right shoulder.      Result Review :  The following labs/imaging/notes were reviewed and discussed with the patient by Evelio Trammell MD on 05/06/2025:       INDICATION: fall, R shoulder and R rib pain      COMPARISON: None.     TECHNIQUE: 3 views of the right shoulder was obtained.     FINDINGS:     No acute fracture or malalignment. Right glenohumeral joint is  preserved. Moderate AC joint arthrosis.     IMPRESSION:     No acute osseous finding.     Moderate right AC joint arthrosis.        This report was signed and finalized on 1/1/2025 7:33 AM by Michael Bai.        Assessment      Diagnoses and all orders for this visit:    1. Chronic right shoulder pain (Primary)  -     MRI Shoulder Right Without Contrast; Future  -     CBC & Differential; Future  -     Comprehensive Metabolic Panel; Future  -     Lipid Panel; Future             Assessment & Plan  1. Right shoulder pain.  - Persistent pain in the right shoulder, described as feeling like an ice pick in the front of the shoulder.  - Difficulty raising the arm and pain when reaching up or out. No significant improvement after physical therapy, including ultrasound, heating pads, isometric exercises, and TENS unit treatment.  - MRI of the right shoulder will be ordered to evaluate for potential rotator cuff tear or other underlying issues.  - Advised to continue with the exercises recommended during physical therapy.    Orders Placed This Encounter   Procedures    MRI Shoulder Right Without Contrast     Standing Status:   Future     Expected Date:   5/9/2025     Expiration Date:   8/6/2026     Reason for Exam::   Right shoulder pain and decreased mobility.     Release to patient:   Routine Release [[id number]]    Comprehensive Metabolic Panel     Standing Status:   Future     Expected Date:   8/6/2025      Expiration Date:   5/6/2026     Release to patient:   Routine Release [[id number]]    Lipid Panel     Standing Status:   Future     Expected Date:   8/6/2025     Expiration Date:   5/6/2026     Release to patient:   Routine Release [[id number]]    CBC & Differential     Standing Status:   Future     Expected Date:   8/6/2025     Expiration Date:   5/6/2026     Manual Differential:   No     Release to patient:   Routine Release [[id number]]       Follow Up   Return in about 3 months (around 8/6/2025) for HTN, RIght shoulder pain, GERD, Annual labs prior to appt. .  Patient was given instructions and counseling regarding his condition or for health maintenance advice. Please see specific information pulled into the AVS if appropriate.         Patient or patient representative verbalized consent for the use of Ambient Listening during the visit with  Evelio Trammell MD for chart documentation. 5/6/2025  15:30 CDT

## 2025-06-05 ENCOUNTER — HOSPITAL ENCOUNTER (OUTPATIENT)
Dept: MRI IMAGING | Facility: HOSPITAL | Age: 60
Discharge: HOME | End: 2025-06-05
Payer: OTHER MISCELLANEOUS

## 2025-06-05 DIAGNOSIS — M25.511 CHRONIC RIGHT SHOULDER PAIN: ICD-10-CM

## 2025-06-05 DIAGNOSIS — G89.29 CHRONIC RIGHT SHOULDER PAIN: ICD-10-CM

## 2025-06-05 PROCEDURE — 73221 MRI JOINT UPR EXTREM W/O DYE: CPT

## 2025-06-16 ENCOUNTER — TELEPHONE (OUTPATIENT)
Dept: FAMILY MEDICINE CLINIC | Facility: CLINIC | Age: 60
End: 2025-06-16

## 2025-06-16 NOTE — TELEPHONE ENCOUNTER
Caller: Ty Webb    Relationship: Self    Best call back number: [phone]    Caller requesting test results: SELF     What test was performed: MRI OF RIGHT SHOULDER     When was the test performed: 6/5/25    Where was the test performed: [facility]     Additional notes[facility] DO[facility] NOT UNDERSTAND THE [facility]ING[facility]

## 2025-06-17 NOTE — TELEPHONE ENCOUNTER
Reviewed MRI. Patient will need orthopedic referral and likely have to have surgical repair of rotator cuff. There are four muscles that make up the rotator cuff 1) supraspinatous 2) infraspinatus, 3) subscapularis, 4) Teres minor. You have completely torn two of them (1&2 above). The third muscle is partially torn. You also have some tearing of the bicep tendon where it attaches to the shoulder.  The labrum is the cartilage that surrounds the shoulder joint. This is torn as well. Went ahead and placed an urgent order for orthopedic surgery. Let me know if any questions.

## 2025-06-18 ENCOUNTER — TELEPHONE (OUTPATIENT)
Dept: FAMILY MEDICINE CLINIC | Facility: CLINIC | Age: 60
End: 2025-06-18

## 2025-06-18 NOTE — TELEPHONE ENCOUNTER
Caller: Ty Webb    Relationship: Self    Best call back number: [phone]     What is the best time to reach you: ANY     Who are you requesting to speak with (clinical staff, provider,  specific staff member): CLINICAL    Do you know the name of the person who called: SANTINO    What was the call regarding: PT RETURNING CALL REGARDING RESULTS. PLEASE CALL ONCE AVAILABLE

## 2025-06-30 ENCOUNTER — TELEPHONE (OUTPATIENT)
Age: 60
End: 2025-06-30

## 2025-06-30 NOTE — TELEPHONE ENCOUNTER
Pending wc info    NEEDING:   Employer info: name, address, phone number, HR or supervisor name  Claim: Number, insurance contact name and number

## 2025-07-01 NOTE — TELEPHONE ENCOUNTER
Pt called back got he claim # the employer name and number pt dose not have a insurance contact name nor a number yet

## 2025-07-28 DIAGNOSIS — G89.29 CHRONIC RIGHT SHOULDER PAIN: ICD-10-CM

## 2025-07-28 DIAGNOSIS — M25.511 CHRONIC RIGHT SHOULDER PAIN: ICD-10-CM

## 2025-07-29 LAB
ALBUMIN SERPL-MCNC: 4.3 G/DL (ref 3.5–5.2)
ALBUMIN/GLOB SERPL: 2 G/DL
ALP SERPL-CCNC: 68 U/L (ref 39–117)
ALT SERPL-CCNC: 25 U/L (ref 1–41)
AST SERPL-CCNC: 22 U/L (ref 1–40)
BASOPHILS # BLD AUTO: 0.04 10*3/MM3 (ref 0–0.2)
BASOPHILS NFR BLD AUTO: 0.6 % (ref 0–1.5)
BILIRUB SERPL-MCNC: 0.5 MG/DL (ref 0–1.2)
BUN SERPL-MCNC: 19 MG/DL (ref 8–23)
BUN/CREAT SERPL: 15.6 (ref 7–25)
CALCIUM SERPL-MCNC: 9.2 MG/DL (ref 8.6–10.5)
CHLORIDE SERPL-SCNC: 100 MMOL/L (ref 98–107)
CHOLEST SERPL-MCNC: 186 MG/DL (ref 0–200)
CO2 SERPL-SCNC: 26.6 MMOL/L (ref 22–29)
CREAT SERPL-MCNC: 1.22 MG/DL (ref 0.76–1.27)
EGFRCR SERPLBLD CKD-EPI 2021: 67.5 ML/MIN/1.73
EOSINOPHIL # BLD AUTO: 0.16 10*3/MM3 (ref 0–0.4)
EOSINOPHIL NFR BLD AUTO: 2.4 % (ref 0.3–6.2)
ERYTHROCYTE [DISTWIDTH] IN BLOOD BY AUTOMATED COUNT: 13.1 % (ref 12.3–15.4)
GLOBULIN SER CALC-MCNC: 2.1 GM/DL
GLUCOSE SERPL-MCNC: 126 MG/DL (ref 65–99)
HCT VFR BLD AUTO: 45 % (ref 37.5–51)
HDLC SERPL-MCNC: 27 MG/DL (ref 40–60)
HGB BLD-MCNC: 15.2 G/DL (ref 13–17.7)
IMM GRANULOCYTES # BLD AUTO: 0.01 10*3/MM3 (ref 0–0.05)
IMM GRANULOCYTES NFR BLD AUTO: 0.1 % (ref 0–0.5)
LDLC SERPL CALC-MCNC: 103 MG/DL (ref 0–100)
LYMPHOCYTES # BLD AUTO: 2.97 10*3/MM3 (ref 0.7–3.1)
LYMPHOCYTES NFR BLD AUTO: 44.1 % (ref 19.6–45.3)
MCH RBC QN AUTO: 29.8 PG (ref 26.6–33)
MCHC RBC AUTO-ENTMCNC: 33.8 G/DL (ref 31.5–35.7)
MCV RBC AUTO: 88.2 FL (ref 79–97)
MONOCYTES # BLD AUTO: 0.61 10*3/MM3 (ref 0.1–0.9)
MONOCYTES NFR BLD AUTO: 9.1 % (ref 5–12)
NEUTROPHILS # BLD AUTO: 2.95 10*3/MM3 (ref 1.7–7)
NEUTROPHILS NFR BLD AUTO: 43.7 % (ref 42.7–76)
PLATELET # BLD AUTO: 130 10*3/MM3 (ref 140–450)
POTASSIUM SERPL-SCNC: 4.4 MMOL/L (ref 3.5–5.2)
PROT SERPL-MCNC: 6.4 G/DL (ref 6–8.5)
RBC # BLD AUTO: 5.1 10*6/MM3 (ref 4.14–5.8)
SODIUM SERPL-SCNC: 137 MMOL/L (ref 136–145)
TRIGL SERPL-MCNC: 327 MG/DL (ref 0–150)
VLDLC SERPL CALC-MCNC: 56 MG/DL (ref 5–40)
WBC # BLD AUTO: 6.74 10*3/MM3 (ref 3.4–10.8)

## 2025-08-04 ENCOUNTER — OFFICE VISIT (OUTPATIENT)
Age: 60
End: 2025-08-04
Payer: COMMERCIAL

## 2025-08-04 VITALS — BODY MASS INDEX: 30.23 KG/M2 | WEIGHT: 261.3 LBS | HEIGHT: 78 IN

## 2025-08-04 DIAGNOSIS — S46.111A LABRAL TEAR OF LONG HEAD OF RIGHT BICEPS TENDON, INITIAL ENCOUNTER: ICD-10-CM

## 2025-08-04 DIAGNOSIS — S46.011A TRAUMATIC COMPLETE TEAR OF RIGHT ROTATOR CUFF, INITIAL ENCOUNTER: Primary | ICD-10-CM

## 2025-08-04 PROCEDURE — 99204 OFFICE O/P NEW MOD 45 MIN: CPT | Performed by: ORTHOPAEDIC SURGERY

## 2025-08-04 RX ORDER — LOSARTAN POTASSIUM 50 MG/1
50 TABLET ORAL DAILY
COMMUNITY

## 2025-08-04 RX ORDER — MULTIVITAMIN WITH IRON
1 TABLET ORAL DAILY
COMMUNITY

## 2025-08-04 RX ORDER — LISINOPRIL 10 MG/1
10 TABLET ORAL
COMMUNITY

## (undated) DEVICE — Device: Brand: DEFENDO AIR/WATER/SUCTION AND BIOPSY VALVE

## (undated) DEVICE — CUFF,BP,DISP,1 TUBE,ADULT,HP: Brand: MEDLINE

## (undated) DEVICE — MASK,OXYGEN,MED CONC,ADLT,7' TUB, UC: Brand: PENDING

## (undated) DEVICE — THE CHANNEL CLEANING BRUSH IS A NYLON FLEXI BRUSH ATTACHED TO A FLEXIBLE PLASTIC SHEATH DESIGNED TO SAFELY REMOVE DEBRIS FROM FLEXIBLE ENDOSCOPES.

## (undated) DEVICE — YANKAUER,BULB TIP WITH VENT: Brand: ARGYLE

## (undated) DEVICE — SENSR O2 OXIMAX FNGR A/ 18IN NONSTR